# Patient Record
Sex: FEMALE | Race: WHITE | Employment: OTHER | URBAN - METROPOLITAN AREA
[De-identification: names, ages, dates, MRNs, and addresses within clinical notes are randomized per-mention and may not be internally consistent; named-entity substitution may affect disease eponyms.]

---

## 2017-01-12 PROBLEM — F41.1 GAD (GENERALIZED ANXIETY DISORDER): Status: ACTIVE | Noted: 2017-01-12

## 2017-01-12 NOTE — PROGRESS NOTES
HPI:    Patient ID: Eddie Castañeda is a 61year old female. Anxiety  This is a chronic problem. The current episode started more than 1 year ago. The problem occurs constantly. The problem has been gradually worsening.  Pertinent negatives include no needed.  Disp:  Rfl:      Allergies:  Cephalosporins          Hives  Penicillins             Hives  Sulfamethoxazole        Hives  Trimethoprim            Hives   PHYSICAL EXAM:   Physical Exam   Constitutional: She appears well-developed and well-nourished

## 2017-02-04 ENCOUNTER — OFFICE VISIT (OUTPATIENT)
Dept: OBGYN CLINIC | Facility: CLINIC | Age: 57
End: 2017-02-04

## 2017-02-04 VITALS
SYSTOLIC BLOOD PRESSURE: 128 MMHG | DIASTOLIC BLOOD PRESSURE: 83 MMHG | BODY MASS INDEX: 26 KG/M2 | WEIGHT: 173.38 LBS | HEART RATE: 75 BPM

## 2017-02-04 DIAGNOSIS — N92.0 MENORRHAGIA WITH REGULAR CYCLE: Primary | ICD-10-CM

## 2017-02-04 PROCEDURE — 99204 OFFICE O/P NEW MOD 45 MIN: CPT | Performed by: OBSTETRICS & GYNECOLOGY

## 2017-02-04 NOTE — PROGRESS NOTES
Leopold Otter    1960       Patient presents with:  Gyn Problem: heavy menses/NEW PT TO CAP  Pt referred by pcp due to she is still 61years old with regular monthly menses.   Pt states that she feels fine and has no abnormal bleeding between to irritated area  Twice a day Disp: 30 g Rfl: 2   LORazepam (ATIVAN) 0.5 MG Oral Tab Take 1 tablet by mouth every 6 (six) hours as needed for Anxiety.  take 1 tablet (0.5MG)  by ORAL route  every 6 hours for anxiety Disp: 60 tablet Rfl: 5   betamethasone v

## 2017-02-14 ENCOUNTER — TELEPHONE (OUTPATIENT)
Dept: INTERNAL MEDICINE CLINIC | Facility: CLINIC | Age: 57
End: 2017-02-14

## 2017-02-14 NOTE — TELEPHONE ENCOUNTER
Per pt, the med Escitalopram she's taking makes her feel weird, nauseous, headaches, lethargic on/off, pt would like to know if she can stop now or what to do.

## 2017-02-14 NOTE — TELEPHONE ENCOUNTER
Pt stts taking Escitalopram 5 mg for 1 month but it makes her feel \"foggy and weird\". Would like to stop taking medication and be put back on Ativan. Please advise.

## 2017-02-23 ENCOUNTER — TELEPHONE (OUTPATIENT)
Dept: INTERNAL MEDICINE CLINIC | Facility: CLINIC | Age: 57
End: 2017-02-23

## 2017-02-23 NOTE — TELEPHONE ENCOUNTER
Pt calling in states she spoke to Dr. Ana Sanchez due to Dr. Ruben Duron being out of the office. Dr. Ana Sanchez told patient to discontinue medication due to patient not feeling \"right\" on medication.       Pt states she would like to speak with Dr. Liseth Pratt

## 2017-02-25 RX ORDER — ALPRAZOLAM 0.25 MG/1
0.25 TABLET ORAL 2 TIMES DAILY PRN
Qty: 30 TABLET | Refills: 0 | Status: SHIPPED | OUTPATIENT
Start: 2017-02-25 | End: 2018-03-17

## 2017-02-25 NOTE — TELEPHONE ENCOUNTER
Per Dr. Hayden Azul printed script, order called to [facility] at [address] [place] [address] on file for Alprazolam 0.25 mg, qty #30; no additional refills.

## 2017-02-25 NOTE — TELEPHONE ENCOUNTER
Patient returned Dr. Mohini Najera call. Advise DR. Jose Lunsford was with a patient and will   Provide the message to him.  Patient will wait for Dr. Mohini Najera call   Please call at [phone]  Thank you,

## 2017-02-28 ENCOUNTER — OFFICE VISIT (OUTPATIENT)
Dept: PODIATRY CLINIC | Facility: CLINIC | Age: 57
End: 2017-02-28

## 2017-02-28 DIAGNOSIS — B35.1 DERMATOPHYTOSIS OF NAIL: Primary | ICD-10-CM

## 2017-02-28 PROCEDURE — 99213 OFFICE O/P EST LOW 20 MIN: CPT

## 2017-02-28 NOTE — PROGRESS NOTES
HPI:    Patient ID: Jose Adan is a 61year old female. HPI     1. Toenail Mycosis  This is chronic problem. Receives in office nail care as treatment - stable on current management. Denies any new complains.  The patient has come to the clinic f EACH NOSTRIL TWO TIMES A DAY Disp: 1 Bottle Rfl: 3   Levocetirizine Dihydrochloride 5 MG Oral Tab Take 1 tablet (5 mg total) by mouth every evening. Disp: 30 tablet Rfl: 1   naproxen 500 MG Oral Tab Take 500 mg by mouth 2 (two) times daily as needed.  Disp: Denies any new complains. The patient has come to the clinic for f/u nail fungus right hallux. She has been using Vicks vapor rubs on a daily basis and states she feels like progress has slowed. She also noticed some on her left hallux.    Clinical examinat

## 2017-03-09 ENCOUNTER — OFFICE VISIT (OUTPATIENT)
Dept: FAMILY MEDICINE CLINIC | Facility: CLINIC | Age: 57
End: 2017-03-09

## 2017-03-09 VITALS
HEART RATE: 89 BPM | WEIGHT: 165 LBS | BODY MASS INDEX: 25.01 KG/M2 | DIASTOLIC BLOOD PRESSURE: 80 MMHG | SYSTOLIC BLOOD PRESSURE: 120 MMHG | HEIGHT: 68 IN | OXYGEN SATURATION: 98 % | RESPIRATION RATE: 16 BRPM | TEMPERATURE: 98 F

## 2017-03-09 DIAGNOSIS — J01.40 ACUTE PANSINUSITIS, RECURRENCE NOT SPECIFIED: Primary | ICD-10-CM

## 2017-03-09 PROCEDURE — 99213 OFFICE O/P EST LOW 20 MIN: CPT | Performed by: NURSE PRACTITIONER

## 2017-03-09 RX ORDER — ESCITALOPRAM OXALATE 10 MG/1
10 TABLET ORAL DAILY
Qty: 30 TABLET | Refills: 1 | OUTPATIENT
Start: 2017-03-09

## 2017-03-09 RX ORDER — DOXYCYCLINE HYCLATE 100 MG
100 TABLET ORAL 2 TIMES DAILY
Qty: 14 TABLET | Refills: 0 | Status: SHIPPED | OUTPATIENT
Start: 2017-03-09 | End: 2017-03-16

## 2017-03-09 NOTE — PATIENT INSTRUCTIONS
Acute Bacterial Rhinosinusitis (ABRS)  Acute bacterial rhinosinusitis (ABRS) is an infection of your nasal cavity and sinuses. It’s caused by bacteria. Acute means that you’ve had symptoms for less than 12 weeks.   Understanding your sinuses  The nasal ca · Nasal decongestant medicine. Spray or drops may help to lessen congestion. Do not use them for more than a few days. · Salt wash (saline irrigation). This can help to loosen mucus.   Possible complications of ABRS  ABRS may come back or become long-term · Drink plenty of water, hot tea, and other liquids. This may help thin mucus. It also may promote sinus drainage. · Heat may help soothe painful areas of the face. Use a towel soaked in hot water.  Or,  the shower and direct the hot spray onto you · Unusual drowsiness or confusion  · Swelling of the forehead or eyelids  · Vision problems, including blurred or double vision  · Fever of 100.4ºF (38ºC) or higher, or as directed by your healthcare provider  · Seizure  · Breathing problems  · Symptoms no · redness, blistering, peeling or loosening of the skin, including inside the mouth  · severe stomach pain or cramps  · unusual bleeding or bruising  · unusually weak or tired  · yellowing of the eyes or skin  Side effects that usually do not require medic Do not take this medicine just before going to bed. It may not dissolve properly when you lay down and can cause pain in your throat. Drink plenty of fluids while taking this medicine to also help reduce irritation in your throat.   This medicine can make y

## 2017-03-09 NOTE — PROGRESS NOTES
CHIEF COMPLAINT:   Patient presents with:  URI: X 7 days, worsened in last three days      HPI:   Lilia Agrawal is a 61year old female who presents for upper respiratory symptoms for  7 days.  Patient reports congestion, turning green colored nasal di Tretinoin (ATRALIN) 0.05 % Apply Externally Gel Apply  topically.  apply 1 Application by Topical route  every day to affected area Disp:  Rfl:       Past Medical History   Diagnosis Date   • Chronic rhinitis    • Right knee injury 1989     Arthroscopy   • CARDIO: RRR without murmur  EXTREMITIES: no cyanosis, clubbing or edema  LYMPH:  + anterior lymphadenopathy.         ASSESSMENT AND PLAN:   Blas Nguyen is a 61year old female who presents with upper respiratory symptoms that are consistent with    A The symptoms of ABRS may be different for each person, and can include:  · Nasal congestion  · Runny nose  · Fluid draining from the nose down the throat (postnasal drip)  · Headache  · Cough  · Pain in the sinuses  · Thick, colored fluid from the nose (mu · Confusion or trouble staying awake   Date Last Reviewed: 3/3/2015  © 6820-7682 [address], [facility] [place]. All rights reserved. This information is not intended as a substitute for professional medical care.  Elaine Cid · Over-the-counter decongestants may be used unless a similar medicine was prescribed. Nasal sprays work the fastest. Use one that contains phenylephrine or oxymetazoline. First blow the nose gently. Then use the spray.  Do not use these medicines more ofte © 1107-0781 The [address] [place] [address], [address] [place]. All rights reserved. This information is not intended as a substitute for professional medical care. Always follow your healthcare professional's instructions.         Doxycyc · loss of appetite  · nausea, vomiting  What may interact with this medicine?   · antacids  · barbiturates  · birth control pills  · bismuth subsalicylate  · carbamazepine  · methoxyflurane  · other antibiotics  · phenytoin  · vitamins that contain iron  · Birth control pills may not work properly while you are taking this medicine. Talk to your doctor about using an extra method of birth control.   If you are being treated for a sexually transmitted infection, avoid sexual contact until you have finished you

## 2017-04-03 ENCOUNTER — TELEPHONE (OUTPATIENT)
Dept: OBGYN CLINIC | Facility: CLINIC | Age: 57
End: 2017-04-03

## 2017-04-03 DIAGNOSIS — N92.0 MENORRHAGIA WITH REGULAR CYCLE: Primary | ICD-10-CM

## 2017-04-03 NOTE — TELEPHONE ENCOUNTER
Per the pt she is suppose to have a pelvic ultrasound done, but there is no order in the system for it. The pt is requesting that order. The pt states that a detailed v/m can be left at [phone]. Please advise.

## 2017-04-03 NOTE — TELEPHONE ENCOUNTER
Pt was seen by CAP on 2/4/17 for heavy menses. Per CAPs office visit notes, CAP rec that pt do an US day 7-10 of her cycle and if endometrium looks abnormal then do embx. Message to CAP to please review and advise--OK to order US?

## 2017-04-05 NOTE — TELEPHONE ENCOUNTER
LMP 4/1/17. Pt wanting to schedule US day 7-10 of her cycle. Sent to CAP to put in order. How do you want it ordered.

## 2017-04-08 ENCOUNTER — HOSPITAL ENCOUNTER (OUTPATIENT)
Dept: ULTRASOUND IMAGING | Age: 57
Discharge: HOME | End: 2017-04-08
Attending: OBSTETRICS & GYNECOLOGY
Payer: COMMERCIAL

## 2017-04-08 DIAGNOSIS — N92.0 MENORRHAGIA WITH REGULAR CYCLE: ICD-10-CM

## 2017-04-08 PROCEDURE — 76856 US EXAM PELVIC COMPLETE: CPT

## 2017-04-08 PROCEDURE — 76830 TRANSVAGINAL US NON-OB: CPT

## 2017-04-10 ENCOUNTER — TELEPHONE (OUTPATIENT)
Dept: OBGYN CLINIC | Facility: CLINIC | Age: 57
End: 2017-04-10

## 2017-04-10 RX ORDER — MISOPROSTOL 200 UG/1
400 TABLET ORAL ONCE
Qty: 2 TABLET | Refills: 0 | Status: SHIPPED | OUTPATIENT
Start: 2017-04-10 | End: 2017-04-10

## 2017-04-11 NOTE — TELEPHONE ENCOUNTER
Endometrial lining is thickened for  Her age- I recommend the emb that we discussed at visit. Please send erx for cytotec 400mcg at bedtime, the night before procedure and to take motrin 400 mg 30 min before her appt.   US is otherwise normal.

## 2017-04-11 NOTE — TELEPHONE ENCOUNTER
Pt notified of CAP recs below. Pt accepted appt on 4/26 for EMBX. Pt instructed to take 400 mcg of cytotec the night before procedure and 400 mg of Motrin 30 min prior to appt.

## 2017-04-24 NOTE — TELEPHONE ENCOUNTER
PT CALLING BACK FOR DIRECTION ON TAKING THE MEDICATION BEFORE THE PROCURE THE MOTRIN MEDS / HERE'S ANOTHER NUMBER TO REACH [phone] / PLS ADV

## 2017-04-24 NOTE — TELEPHONE ENCOUNTER
Informed pt on proper use of the cytotec, take orally at bedtime the night before the procedure. Pt verbalized understanding.

## 2017-04-26 ENCOUNTER — OFFICE VISIT (OUTPATIENT)
Dept: OBGYN CLINIC | Facility: CLINIC | Age: 57
End: 2017-04-26

## 2017-04-26 VITALS — SYSTOLIC BLOOD PRESSURE: 127 MMHG | DIASTOLIC BLOOD PRESSURE: 83 MMHG | HEART RATE: 71 BPM

## 2017-04-26 DIAGNOSIS — N92.0 EXCESSIVE OR FREQUENT MENSTRUATION: ICD-10-CM

## 2017-04-26 DIAGNOSIS — N92.0 MENORRHAGIA WITH REGULAR CYCLE: Primary | ICD-10-CM

## 2017-04-26 PROCEDURE — 58100 BIOPSY OF UTERUS LINING: CPT | Performed by: OBSTETRICS & GYNECOLOGY

## 2017-04-26 PROCEDURE — 88305 TISSUE EXAM BY PATHOLOGIST: CPT | Performed by: OBSTETRICS & GYNECOLOGY

## 2017-04-26 NOTE — PROCEDURES
Endometrial Biopsy    Pre-Procedure Care:   Consent was obtained. Procedure/risks were explained. Questions were answered. Correct patient was identified. Correct side and site were confirmed.     Pregnancy Results: negative from n/a test   Birth contro

## 2017-04-28 ENCOUNTER — TELEPHONE (OUTPATIENT)
Dept: OBGYN CLINIC | Facility: CLINIC | Age: 57
End: 2017-04-28

## 2017-05-01 NOTE — TELEPHONE ENCOUNTER
Pt informed of results and recs to monitor periods for 1-2 cycles. Pt states she is confused. She was not aware that she had a polyp, and states the biospy was to check on her uterine lining. Pt informed that the polyp was found with the biopsy.   Pt stat

## 2017-05-01 NOTE — TELEPHONE ENCOUNTER
Endometrial biopsy was suggestive of a benign endometrial polyp- if her bleeding persists then it may be possible that not all of the polyp came out at the time of emb and she may need to have a hysteroscopy/D&C.   Please have her monitor her next 1-2 mense

## 2017-05-05 NOTE — TELEPHONE ENCOUNTER
Called pt and explained the path report and CAPs note below. Pt now understands and is very appreciative for the clarification. Pt will monitor 1-2 more menses and call us with an update. All questions answered, pt verbalized understanding.

## 2017-06-12 ENCOUNTER — TELEPHONE (OUTPATIENT)
Dept: PODIATRY CLINIC | Facility: CLINIC | Age: 57
End: 2017-06-12

## 2017-06-12 NOTE — TELEPHONE ENCOUNTER
pt called. She was a pt of [facility]. She asked if Dr. Hafsa Luna removes toenail. Her toenail has split. Please advise. Thank you.

## 2017-08-22 ENCOUNTER — OFFICE VISIT (OUTPATIENT)
Dept: INTERNAL MEDICINE CLINIC | Facility: CLINIC | Age: 57
End: 2017-08-22

## 2017-08-22 VITALS
SYSTOLIC BLOOD PRESSURE: 126 MMHG | TEMPERATURE: 99 F | HEIGHT: 68 IN | RESPIRATION RATE: 12 BRPM | DIASTOLIC BLOOD PRESSURE: 77 MMHG | HEART RATE: 73 BPM

## 2017-08-22 DIAGNOSIS — M54.50 ACUTE BILATERAL LOW BACK PAIN WITHOUT SCIATICA: Primary | ICD-10-CM

## 2017-08-22 LAB
APPEARANCE: CLEAR
BACTERIA UR QL AUTO: NEGATIVE /HPF
BILIRUB UR QL: NEGATIVE
BILIRUBIN: NEGATIVE
CLARITY UR: CLEAR
COLOR UR: YELLOW
GLUCOSE (URINE DIPSTICK): NEGATIVE MG/DL
GLUCOSE UR-MCNC: NEGATIVE MG/DL
KETONES (URINE DIPSTICK): NEGATIVE MG/DL
KETONES UR-MCNC: NEGATIVE MG/DL
LEUKOCYTE ESTERASE UR QL STRIP.AUTO: NEGATIVE
LEUKOCYTES: NEGATIVE
MULTISTIX LOT#: NORMAL NUMERIC
NITRITE UR QL STRIP.AUTO: NEGATIVE
NITRITE, URINE: NEGATIVE
PH UR: 6 [PH] (ref 5–8)
PH, URINE: 6 (ref 4.5–8)
PROT UR-MCNC: NEGATIVE MG/DL
PROTEIN (URINE DIPSTICK): NEGATIVE MG/DL
RBC #/AREA URNS AUTO: 1 /HPF
SP GR UR STRIP: 1.02 (ref 1–1.03)
SPECIFIC GRAVITY: 1.02 (ref 1–1.03)
URINE-COLOR: YELLOW
UROBILINOGEN UR STRIP-ACNC: <2
UROBILINOGEN,SEMI-QN: 0.2 MG/DL (ref 0–1.9)
VIT C UR-MCNC: NEGATIVE MG/DL
WBC #/AREA URNS AUTO: <1 /HPF

## 2017-08-22 PROCEDURE — 99212 OFFICE O/P EST SF 10 MIN: CPT | Performed by: INTERNAL MEDICINE

## 2017-08-22 PROCEDURE — 99214 OFFICE O/P EST MOD 30 MIN: CPT | Performed by: INTERNAL MEDICINE

## 2017-08-22 PROCEDURE — 81003 URINALYSIS AUTO W/O SCOPE: CPT | Performed by: INTERNAL MEDICINE

## 2017-08-22 RX ORDER — CYCLOBENZAPRINE HCL 10 MG
10 TABLET ORAL NIGHTLY
Qty: 10 TABLET | Refills: 0 | Status: SHIPPED | OUTPATIENT
Start: 2017-08-22 | End: 2017-09-11

## 2017-08-22 NOTE — PROGRESS NOTES
HPI:    Patient ID: Chinmay Jarrell is a 61year old female. Low Back Pain   This is a new problem. The current episode started in the past 7 days. The problem occurs constantly. The problem has been waxing and waning since onset.  The pain is present Rfl: 1   betamethasone valerate (VALISONE) 0.1 % Apply Externally Ointment Apply lightly to irritated area twice a day Disp: 45 g Rfl: 0   Mometasone Furoate (NASONEX) 50 MCG/ACT Nasal Suspension by Nasal route.  inhale 1 spray by Intranasal route 2 times e blood. Doubt she has uti based on exam findings and no pyuria. I suspect this is low back strain and no neurologic deficit noted. . We will continue otc nsaids she is taking and added flexeril q hs. Pt told to call back if symptoms persist/worsens.         Ruben File

## 2017-08-30 ENCOUNTER — TELEPHONE (OUTPATIENT)
Dept: OBGYN CLINIC | Facility: CLINIC | Age: 57
End: 2017-08-30

## 2017-08-30 NOTE — TELEPHONE ENCOUNTER
Pt had sent MyChart note. See below. Hello Dr. Shearon Blizzard,     I was in to see you in April 2017 and had some testing done.  I just wanted to let you know that I just got my period and not sure if I should be concerned  or  just ignore this?      Thanks,

## 2017-09-09 ENCOUNTER — HOSPITAL ENCOUNTER (OUTPATIENT)
Dept: MAMMOGRAPHY | Age: 57
Discharge: HOME | End: 2017-09-09
Attending: INTERNAL MEDICINE
Payer: COMMERCIAL

## 2017-09-09 DIAGNOSIS — Z12.31 ENCOUNTER FOR SCREENING MAMMOGRAM FOR MALIGNANT NEOPLASM OF BREAST: ICD-10-CM

## 2017-09-09 PROCEDURE — 77067 SCR MAMMO BI INCL CAD: CPT | Performed by: INTERNAL MEDICINE

## 2017-09-09 NOTE — TELEPHONE ENCOUNTER
EB IN April 2017 AND WAS TOLD IT WAS NORMAL, JUST SOME SHEDDING. REPORTS SHE GOT A PERIOD ON 8-29 THAT LAST A WEEK, FIRST PERIOD SINCE APRIL. IS NOT HAVING ANY OTHER SYMPTOMS. PERIOD WAS NOT UNUSUALLY HEAVY, SIMPLY AN AVERAGE PERIOD.   PT AWARE CAP IS O

## 2017-09-17 NOTE — TELEPHONE ENCOUNTER
I would like to proceed with hysteroscopy/D&C as we discussed if bleeding continued. If she would like to discuss this with me for preop appt please give her a 10 min appt of she wishes to discuss before proceeding.   There may still be a polyp inside the

## 2017-09-28 NOTE — TELEPHONE ENCOUNTER
Called pt to inform her of CAP's recs below. Pt wanted CAP to know that her last period was 8/29/17 and no bleeding since that date. Since the bleeding has stopped pt wants to make sure she does not need to meet with you to discuss hysteroscopy/D&C.

## 2017-09-29 ENCOUNTER — OFFICE VISIT (OUTPATIENT)
Dept: INTERNAL MEDICINE CLINIC | Facility: CLINIC | Age: 57
End: 2017-09-29

## 2017-09-29 VITALS
DIASTOLIC BLOOD PRESSURE: 77 MMHG | TEMPERATURE: 98 F | RESPIRATION RATE: 12 BRPM | HEIGHT: 68 IN | HEART RATE: 91 BPM | SYSTOLIC BLOOD PRESSURE: 120 MMHG

## 2017-09-29 DIAGNOSIS — M77.8 RIGHT HAND TENDONITIS: Primary | ICD-10-CM

## 2017-09-29 PROCEDURE — 99212 OFFICE O/P EST SF 10 MIN: CPT | Performed by: INTERNAL MEDICINE

## 2017-09-29 PROCEDURE — 99214 OFFICE O/P EST MOD 30 MIN: CPT | Performed by: INTERNAL MEDICINE

## 2017-09-29 RX ORDER — MELOXICAM 15 MG/1
15 TABLET ORAL DAILY
Qty: 14 TABLET | Refills: 0 | Status: SHIPPED | OUTPATIENT
Start: 2017-09-29 | End: 2017-12-01

## 2017-09-29 NOTE — PROGRESS NOTES
HPI:    Patient ID: Juan Manuel George is a 61year old female. Hand Pain    The pain is present in the right hand. This is a new problem. The current episode started more than 1 month ago. There has been no history of extremity trauma.  The problem occu Left eye exhibits no discharge. Neck: Normal range of motion. Neck supple. No JVD present. Cardiovascular: Normal rate, regular rhythm and normal heart sounds. No murmur heard.   Pulmonary/Chest: Effort normal and breath sounds normal. No respiratory

## 2017-10-02 ENCOUNTER — TELEPHONE (OUTPATIENT)
Dept: OBGYN CLINIC | Facility: CLINIC | Age: 57
End: 2017-10-02

## 2017-10-02 NOTE — TELEPHONE ENCOUNTER
Please see 8/30 comm. CAP stated that pt should come in for a 10 min appt to discuss irregular bleeding and talk about possible D&C. Pt stated that she had bleeding on 8/29 for one week.  Pt stated that by the 2nd or 3rd day the bleeding was like a \"normal

## 2017-10-02 NOTE — TELEPHONE ENCOUNTER
Pt states she has been getting a period, states had one on 08/29 and now again 10/01, pt would like to know if this normal since she did have a procedure done in April.  pls adv

## 2017-10-03 ENCOUNTER — OFFICE VISIT (OUTPATIENT)
Dept: OBGYN CLINIC | Facility: CLINIC | Age: 57
End: 2017-10-03

## 2017-10-03 ENCOUNTER — TELEPHONE (OUTPATIENT)
Dept: OBGYN CLINIC | Facility: CLINIC | Age: 57
End: 2017-10-03

## 2017-10-03 VITALS
BODY MASS INDEX: 28 KG/M2 | DIASTOLIC BLOOD PRESSURE: 82 MMHG | HEART RATE: 81 BPM | WEIGHT: 183.19 LBS | SYSTOLIC BLOOD PRESSURE: 127 MMHG

## 2017-10-03 DIAGNOSIS — N95.0 POSTMENOPAUSAL BLEEDING: Primary | ICD-10-CM

## 2017-10-03 PROCEDURE — 99212 OFFICE O/P EST SF 10 MIN: CPT | Performed by: OBSTETRICS & GYNECOLOGY

## 2017-10-03 RX ORDER — MISOPROSTOL 200 UG/1
TABLET ORAL
Qty: 2 TABLET | Refills: 0 | Status: SHIPPED | OUTPATIENT
Start: 2017-10-03 | End: 2017-12-01

## 2017-10-03 NOTE — TELEPHONE ENCOUNTER
Pt would like to speak with Malathi Jamil regarding her visit today, states it is pertaining surgery. Pt states she didn't give the correct or full amount of info.  Per pt she doesn't stop breathing during/after surgery, her bp drops to 0 when recovering from royal

## 2017-10-03 NOTE — TELEPHONE ENCOUNTER
I would still recommend she get records and have an anesthesia consult due to both episodes happened after general anesthesia

## 2017-10-03 NOTE — TELEPHONE ENCOUNTER
Pt states she just saw CAP today and \"was explaining to [facility] that she stops breathing during surgery\" but now that she thinks about it what really happens is \"her BP drops to zero\". Pt states she was very nervous when she was here today.  Pt states s

## 2017-10-03 NOTE — PROGRESS NOTES
Kadeem Becerril    8/26/1960       Patient presents with:  Gyn Problem: Irregular  Pt has had 3 episodes of PMB in spite of negative EMB recently.   I recommended a hys/D&C but pt is very anxious about this because she has had complications with genera

## 2017-10-04 NOTE — TELEPHONE ENCOUNTER
Lmtcb. Please relay info:    Called to see if  An appointment for sonohyst on 10/23/17 in the morning would work for the patient.   Also, wanted to inform the patient that since her us of the pelvis was 10 months old the radiologist may want her to have bot

## 2017-10-04 NOTE — TELEPHONE ENCOUNTER
Pt is calling back ,  10/23 is fine for the procedure , can she drive after it ? And needs the time,.

## 2017-10-04 NOTE — TELEPHONE ENCOUNTER
Pt is returning call, okay to leave detailed vm on mobile number. States that Monday isnt really a good day for her, can it be moved to a Friday?

## 2017-10-06 NOTE — TELEPHONE ENCOUNTER
Patient is scheduled 11/10/17 10:30am pt arrival 11:30am CAP. Patient advised to take up to 600 mgs of ibuprofen 30-60 mins prior to the appointment.  Also, advised to park in green lot and report to [address]   Patient informed she will repeat Reinaldo Blackmon

## 2017-10-11 NOTE — TELEPHONE ENCOUNTER
Called [phone]  And spoke Jhony Lopez. Who states they do not complete prior auths for US. Called [facility] and spoke to Weston Shetty who stated prior [place] is required for All three codes 66855,[id number],[id number]. Clincial info routed [phone].

## 2017-10-20 NOTE — TELEPHONE ENCOUNTER
Called aims spoke to Larry Syed. At 10:34am and she stated they do not review codes  10713,88401,88809. Advised me to contact [facility] [place] who advised me to contact the patient.    Ingris Shipman at [facility] we should have Received documentation noting no prior au

## 2017-10-31 NOTE — TELEPHONE ENCOUNTER
Pt states she got a heavy period and has 2 tests coming up.  Wants to speak to a nurse on what to do

## 2017-10-31 NOTE — TELEPHONE ENCOUNTER
Pt states her period started yesterday and it is heavier than usual. Pt reports she is saturating a tampon about every 1 or 2 hours but is not soaking through them. Pt denies SOB, heart palpitations and dizziness.  Pt states CAP had mentioned that maybe pt

## 2017-11-01 NOTE — TELEPHONE ENCOUNTER
Please advise pt that the sonohysterogram will provide answers about what is causing the bleeding and how best to remove it if there is a lesion present in the uterine cavity. I recommend that we proceed as planned with the sonohysterogram on 11/10/17.

## 2017-11-06 ENCOUNTER — TELEPHONE (OUTPATIENT)
Dept: OBGYN CLINIC | Facility: CLINIC | Age: 57
End: 2017-11-06

## 2017-11-06 NOTE — TELEPHONE ENCOUNTER
PT IS SCHEDULED FOR A SONOHYST ON FRI, 11-10-17 AND PT NEEDS TO CHECK IN AT 10:30AM AS SHE WILL BE HAVING A TRANSVAGINAL ULTRASOUND FIRST. PT MUST NOT BE LATE SO IT WOULD HELP IF SHE ARRIVES AT LEAST 15 MINUTES EARLY TO CHECK IN.    PT NOTIFIED OF THIS INF

## 2017-11-10 ENCOUNTER — HOSPITAL ENCOUNTER (OUTPATIENT)
Dept: ULTRASOUND IMAGING | Facility: HOSPITAL | Age: 57
Discharge: HOME | End: 2017-11-10
Attending: OBSTETRICS & GYNECOLOGY
Payer: COMMERCIAL

## 2017-11-10 ENCOUNTER — TELEPHONE (OUTPATIENT)
Dept: OBGYN CLINIC | Facility: CLINIC | Age: 57
End: 2017-11-10

## 2017-11-10 DIAGNOSIS — N95.0 POSTMENOPAUSAL BLEEDING: ICD-10-CM

## 2017-11-10 PROCEDURE — 76831 ECHO EXAM UTERUS: CPT | Performed by: OBSTETRICS & GYNECOLOGY

## 2017-11-10 PROCEDURE — 76856 US EXAM PELVIC COMPLETE: CPT | Performed by: OBSTETRICS & GYNECOLOGY

## 2017-11-10 PROCEDURE — 58340 CATHETER FOR HYSTEROGRAPHY: CPT | Performed by: OBSTETRICS & GYNECOLOGY

## 2017-11-10 PROCEDURE — 76830 TRANSVAGINAL US NON-OB: CPT | Performed by: OBSTETRICS & GYNECOLOGY

## 2017-11-13 NOTE — TELEPHONE ENCOUNTER
OUTSIDE RECORDS RECEIVED RE: 2014 BREAST REDUCTION AT [facility] TO CAP'S DESK.

## 2017-11-14 ENCOUNTER — TELEPHONE (OUTPATIENT)
Dept: PEDIATRICS CLINIC | Facility: CLINIC | Age: 57
End: 2017-11-14

## 2017-11-14 NOTE — TELEPHONE ENCOUNTER
Pt calling to report that CAP told pt she needs to have D&C for 3 polyps that were noted in her uterus during her hysterosonogram on 11/10. Pt wanting to have D&C done before the end of the year. Message to CAP to please advise.

## 2017-11-16 NOTE — TELEPHONE ENCOUNTER
OB GYN SURGICAL SCHEDULING    Assessment:  endometrial polyps    Pre-Operative Procedure:  Hysteroscopy - surgical, myosure polypectomy        Admission:  Day Surgery    Anesthesia: General    Additional Orders:  Routine Orders    [facility]

## 2017-11-20 NOTE — TELEPHONE ENCOUNTER
Patient confirmed her last cycle started 10/30/17 and she was advised to call first day of next cycle. Per MF patient advised to call if period does not come within next 90 days. PAPERWORK STARTED.

## 2017-11-27 NOTE — TELEPHONE ENCOUNTER
Spoke to patient noted day 7-10 and that I will follow up with her once I receive a confirmation from Dr. Tucker Elizalde.

## 2017-11-29 NOTE — TELEPHONE ENCOUNTER
Patient is scheduled 12/4/17 10:30 hysteroscopy CAP. Pat orders routed. Instructions routed via [employer]. Patient informed.

## 2017-11-29 NOTE — TELEPHONE ENCOUNTER
See below- I have already sent the order for this patients surgery, please schedule hysteroscopy to follow in another room when I am done with my hysterectomy on 12/4 which would be day #10 for her cycle.   Dr Roderick Iraheta will be doing a sling after NJG and I

## 2017-11-29 NOTE — TELEPHONE ENCOUNTER
Called [phone] spoke to Greg Montgomery who noted no prior auth was required for cpt outpatient [id number] NSN#[id number].

## 2017-12-01 ENCOUNTER — TELEPHONE (OUTPATIENT)
Dept: OBGYN CLINIC | Facility: CLINIC | Age: 57
End: 2017-12-01

## 2017-12-01 NOTE — TELEPHONE ENCOUNTER
PER PT CENTRAL SCHEDULING CALLED HER  TO INFORM THE OFFICE NEEDS TO CALL THE INS TO MAKE SURE THE SURGERY IS APPROVED

## 2017-12-04 ENCOUNTER — ANESTHESIA (OUTPATIENT)
Dept: SURGERY | Facility: HOSPITAL | Age: 57
End: 2017-12-04
Payer: COMMERCIAL

## 2017-12-04 ENCOUNTER — ANESTHESIA EVENT (OUTPATIENT)
Dept: SURGERY | Facility: HOSPITAL | Age: 57
End: 2017-12-04
Payer: COMMERCIAL

## 2017-12-04 ENCOUNTER — HOSPITAL ENCOUNTER (OUTPATIENT)
Facility: HOSPITAL | Age: 57
Setting detail: HOSPITAL OUTPATIENT SURGERY
Discharge: HOME | End: 2017-12-04
Attending: OBSTETRICS & GYNECOLOGY | Admitting: OBSTETRICS & GYNECOLOGY
Payer: COMMERCIAL

## 2017-12-04 ENCOUNTER — SURGERY (OUTPATIENT)
Age: 57
End: 2017-12-04

## 2017-12-04 VITALS
HEIGHT: 68 IN | HEART RATE: 50 BPM | BODY MASS INDEX: 27.13 KG/M2 | SYSTOLIC BLOOD PRESSURE: 123 MMHG | DIASTOLIC BLOOD PRESSURE: 68 MMHG | OXYGEN SATURATION: 96 % | WEIGHT: 179 LBS | RESPIRATION RATE: 14 BRPM | TEMPERATURE: 97 F

## 2017-12-04 PROBLEM — N95.0 POSTMENOPAUSAL BLEEDING: Status: ACTIVE | Noted: 2017-12-04

## 2017-12-04 PROBLEM — N84.0 ENDOMETRIAL POLYP: Status: ACTIVE | Noted: 2017-12-04

## 2017-12-04 PROCEDURE — 0UB98ZX EXCISION OF UTERUS, VIA NATURAL OR ARTIFICIAL OPENING ENDOSCOPIC, DIAGNOSTIC: ICD-10-PCS | Performed by: OBSTETRICS & GYNECOLOGY

## 2017-12-04 PROCEDURE — 0UDB8ZX EXTRACTION OF ENDOMETRIUM, VIA NATURAL OR ARTIFICIAL OPENING ENDOSCOPIC, DIAGNOSTIC: ICD-10-PCS | Performed by: OBSTETRICS & GYNECOLOGY

## 2017-12-04 PROCEDURE — 58558 HYSTEROSCOPY BIOPSY: CPT | Performed by: OBSTETRICS & GYNECOLOGY

## 2017-12-04 RX ORDER — KETOROLAC TROMETHAMINE 30 MG/ML
30 INJECTION, SOLUTION INTRAMUSCULAR; INTRAVENOUS ONCE
Status: COMPLETED | OUTPATIENT
Start: 2017-12-04 | End: 2017-12-04

## 2017-12-04 RX ORDER — HYDROMORPHONE HYDROCHLORIDE 1 MG/ML
0.2 INJECTION, SOLUTION INTRAMUSCULAR; INTRAVENOUS; SUBCUTANEOUS EVERY 5 MIN PRN
Status: DISCONTINUED | OUTPATIENT
Start: 2017-12-04 | End: 2017-12-04

## 2017-12-04 RX ORDER — ONDANSETRON HYDROCHLORIDE 8 MG/1
4 TABLET, FILM COATED ORAL EVERY 8 HOURS PRN
Status: DISCONTINUED | OUTPATIENT
Start: 2017-12-04 | End: 2017-12-04

## 2017-12-04 RX ORDER — HYDROCODONE BITARTRATE AND ACETAMINOPHEN 5; 325 MG/1; MG/1
2 TABLET ORAL AS NEEDED
Status: DISCONTINUED | OUTPATIENT
Start: 2017-12-04 | End: 2017-12-04

## 2017-12-04 RX ORDER — NALOXONE HYDROCHLORIDE 0.4 MG/ML
80 INJECTION, SOLUTION INTRAMUSCULAR; INTRAVENOUS; SUBCUTANEOUS AS NEEDED
Status: DISCONTINUED | OUTPATIENT
Start: 2017-12-04 | End: 2017-12-04

## 2017-12-04 RX ORDER — IBUPROFEN 200 MG
200 TABLET ORAL EVERY 4 HOURS PRN
Status: DISCONTINUED | OUTPATIENT
Start: 2017-12-04 | End: 2017-12-04

## 2017-12-04 RX ORDER — HYDROMORPHONE HYDROCHLORIDE 1 MG/ML
0.6 INJECTION, SOLUTION INTRAMUSCULAR; INTRAVENOUS; SUBCUTANEOUS EVERY 5 MIN PRN
Status: DISCONTINUED | OUTPATIENT
Start: 2017-12-04 | End: 2017-12-04

## 2017-12-04 RX ORDER — MORPHINE SULFATE 2 MG/ML
2 INJECTION, SOLUTION INTRAMUSCULAR; INTRAVENOUS EVERY 10 MIN PRN
Status: DISCONTINUED | OUTPATIENT
Start: 2017-12-04 | End: 2017-12-04

## 2017-12-04 RX ORDER — DIPHENHYDRAMINE HYDROCHLORIDE 50 MG/ML
50 INJECTION INTRAMUSCULAR; INTRAVENOUS
Status: DISCONTINUED | OUTPATIENT
Start: 2017-12-04 | End: 2017-12-04

## 2017-12-04 RX ORDER — SODIUM CHLORIDE, SODIUM LACTATE, POTASSIUM CHLORIDE, CALCIUM CHLORIDE 600; 310; 30; 20 MG/100ML; MG/100ML; MG/100ML; MG/100ML
INJECTION, SOLUTION INTRAVENOUS CONTINUOUS
Status: DISCONTINUED | OUTPATIENT
Start: 2017-12-04 | End: 2017-12-04

## 2017-12-04 RX ORDER — IBUPROFEN 200 MG
400 TABLET ORAL EVERY 4 HOURS PRN
Status: DISCONTINUED | OUTPATIENT
Start: 2017-12-04 | End: 2017-12-04

## 2017-12-04 RX ORDER — ACETAMINOPHEN 500 MG
1000 TABLET ORAL ONCE
Status: COMPLETED | OUTPATIENT
Start: 2017-12-04 | End: 2017-12-04

## 2017-12-04 RX ORDER — HYDROMORPHONE HYDROCHLORIDE 1 MG/ML
0.4 INJECTION, SOLUTION INTRAMUSCULAR; INTRAVENOUS; SUBCUTANEOUS EVERY 5 MIN PRN
Status: DISCONTINUED | OUTPATIENT
Start: 2017-12-04 | End: 2017-12-04

## 2017-12-04 RX ORDER — HYDROCODONE BITARTRATE AND ACETAMINOPHEN 5; 325 MG/1; MG/1
1 TABLET ORAL AS NEEDED
Status: DISCONTINUED | OUTPATIENT
Start: 2017-12-04 | End: 2017-12-04

## 2017-12-04 RX ORDER — MORPHINE SULFATE 4 MG/ML
4 INJECTION, SOLUTION INTRAMUSCULAR; INTRAVENOUS EVERY 10 MIN PRN
Status: DISCONTINUED | OUTPATIENT
Start: 2017-12-04 | End: 2017-12-04

## 2017-12-04 RX ORDER — ONDANSETRON 2 MG/ML
INJECTION INTRAMUSCULAR; INTRAVENOUS AS NEEDED
Status: DISCONTINUED | OUTPATIENT
Start: 2017-12-04 | End: 2017-12-04 | Stop reason: SURG

## 2017-12-04 RX ORDER — MORPHINE SULFATE 10 MG/ML
6 INJECTION, SOLUTION INTRAMUSCULAR; INTRAVENOUS EVERY 10 MIN PRN
Status: DISCONTINUED | OUTPATIENT
Start: 2017-12-04 | End: 2017-12-04

## 2017-12-04 RX ORDER — MIDAZOLAM HYDROCHLORIDE 1 MG/ML
INJECTION INTRAMUSCULAR; INTRAVENOUS AS NEEDED
Status: DISCONTINUED | OUTPATIENT
Start: 2017-12-04 | End: 2017-12-04 | Stop reason: SURG

## 2017-12-04 RX ORDER — IBUPROFEN 600 MG/1
600 TABLET ORAL EVERY 4 HOURS PRN
Status: DISCONTINUED | OUTPATIENT
Start: 2017-12-04 | End: 2017-12-04

## 2017-12-04 RX ORDER — SCOLOPAMINE TRANSDERMAL SYSTEM 1 MG/1
1 PATCH, EXTENDED RELEASE TRANSDERMAL
Status: DISCONTINUED | OUTPATIENT
Start: 2017-12-04 | End: 2017-12-07 | Stop reason: HOSPADM

## 2017-12-04 RX ORDER — METOCLOPRAMIDE 10 MG/1
10 TABLET ORAL ONCE
Status: DISCONTINUED | OUTPATIENT
Start: 2017-12-04 | End: 2017-12-04 | Stop reason: HOSPADM

## 2017-12-04 RX ORDER — SODIUM CHLORIDE 0.9 % (FLUSH) 0.9 %
10 SYRINGE (ML) INJECTION AS NEEDED
Status: CANCELLED | OUTPATIENT
Start: 2017-12-04

## 2017-12-04 RX ORDER — FAMOTIDINE 20 MG/1
20 TABLET ORAL ONCE
Status: DISCONTINUED | OUTPATIENT
Start: 2017-12-04 | End: 2017-12-04 | Stop reason: HOSPADM

## 2017-12-04 RX ORDER — IBUPROFEN 600 MG/1
600 TABLET ORAL EVERY 6 HOURS PRN
Qty: 30 TABLET | Refills: 0 | Status: SHIPPED | OUTPATIENT
Start: 2017-12-04 | End: 2017-12-27

## 2017-12-04 RX ORDER — ONDANSETRON 2 MG/ML
4 INJECTION INTRAMUSCULAR; INTRAVENOUS EVERY 8 HOURS PRN
Status: DISCONTINUED | OUTPATIENT
Start: 2017-12-04 | End: 2017-12-04

## 2017-12-04 RX ORDER — DEXAMETHASONE SODIUM PHOSPHATE 4 MG/ML
VIAL (ML) INJECTION AS NEEDED
Status: DISCONTINUED | OUTPATIENT
Start: 2017-12-04 | End: 2017-12-04 | Stop reason: SURG

## 2017-12-04 RX ORDER — LIDOCAINE HYDROCHLORIDE 10 MG/ML
INJECTION, SOLUTION EPIDURAL; INFILTRATION; INTRACAUDAL; PERINEURAL AS NEEDED
Status: DISCONTINUED | OUTPATIENT
Start: 2017-12-04 | End: 2017-12-04 | Stop reason: SURG

## 2017-12-04 RX ADMIN — DEXAMETHASONE SODIUM PHOSPHATE 4 MG: 4 MG/ML VIAL (ML) INJECTION at 11:09:00

## 2017-12-04 RX ADMIN — SODIUM CHLORIDE, SODIUM LACTATE, POTASSIUM CHLORIDE, CALCIUM CHLORIDE: 600; 310; 30; 20 INJECTION, SOLUTION INTRAVENOUS at 11:15:00

## 2017-12-04 RX ADMIN — ONDANSETRON 4 MG: 2 INJECTION INTRAMUSCULAR; INTRAVENOUS at 11:09:00

## 2017-12-04 RX ADMIN — SODIUM CHLORIDE, SODIUM LACTATE, POTASSIUM CHLORIDE, CALCIUM CHLORIDE: 600; 310; 30; 20 INJECTION, SOLUTION INTRAVENOUS at 10:55:00

## 2017-12-04 RX ADMIN — KETOROLAC TROMETHAMINE 30 MG: 30 INJECTION, SOLUTION INTRAMUSCULAR; INTRAVENOUS at 11:17:00

## 2017-12-04 RX ADMIN — MIDAZOLAM HYDROCHLORIDE 2 MG: 1 INJECTION INTRAMUSCULAR; INTRAVENOUS at 10:56:00

## 2017-12-04 RX ADMIN — LIDOCAINE HYDROCHLORIDE 50 MG: 10 INJECTION, SOLUTION EPIDURAL; INFILTRATION; INTRACAUDAL; PERINEURAL at 10:58:00

## 2017-12-04 NOTE — ANESTHESIA PREPROCEDURE EVALUATION
Anesthesia PreOp Note    HPI:     Barron Wakefield is a 61year old female who presents for preoperative consultation requested by: Joshua Hunter MD    Date of Surgery: 12/4/2017    Procedure(s):   MYOMECTOMY HYSTEROSCOPIC  Indication: endometrial poly 2013  No date: KNEE ARTHROSCOPY Right      Comment: x3 last 2013  2010: ROSI NEEDLE LOCALIZATION W/ SPECIMEN 1 SITE LEFT  2009: ROSI NEEDLE LOCALIZATION W/ SPECIMEN 1 SITE RIG*  05/2014: REDUCTION LEFT  2014: REDUCTION OF LARGE BREAST Bilateral  05/2014: RED History Main Topics   Smoking status: Former Smoker  0.20 Packs/day  For 18.00 Years     Types: Cigarettes    Quit date: 1/1/1988    Smokeless tobacco: Never Used    Comment: socially    Alcohol use Yes  0.5 oz/week    1 Glasses of wine per week         Co Patient  Use of Blood Products Discussed With:  Patient  Blood Product Use Consented        I have informed Manistee Brunt  of the nature of the anesthetic plan, benefits, risks, major complications, and any alternative forms of anesthetic management.

## 2017-12-04 NOTE — OPERATIVE REPORT
[facility] OPERATING ROOM  Operative Note     Shruti [place] Location: OR   [facility] [id number] MRN [id number]   Admission Date 12/4/2017 Operation Date 12/4/2017   Attending Physician Paige Juan MD Operating Physician Parul Solares.  MD Markell Chiang

## 2017-12-04 NOTE — H&P
[address] Patient Status:  Hospital Outpatient Surgery    1960 MRN [id number]   Location [facility] Attending Mayelin Dillard MD   Hosp Day # 0 PCP Barron Hernandez RIGHT  Family History   Problem Relation Age of Onset   • Adopted:  Yes   • Family history unknown: Yes     Social History:  Smoking status: Former Smoker                                                              Packs/day: 0.20      Years: 18.00 06/13/2016   HCT 40.1 06/13/2016    06/13/2016   CREATSERUM 0.85 06/13/2016   BUN 12 06/13/2016    06/13/2016   K 4.1 06/13/2016    06/13/2016   CO2 26 06/13/2016   GLU 88 06/13/2016   CA 8.6 06/13/2016   ALB 4.0 06/13/2016   BILT 0.8 06

## 2017-12-04 NOTE — DISCHARGE SUMMARY
[facility]    Discharge Summary    Christopher Rea Patient Status:  Hospital Outpatient Surgery    1960 MRN [id number]   Location [place] [address] Attending Hollie Garcia MD   Hosp Day # 0 PCP Mirta Brito · Do not drive any motor vehicle or bicycle   · Avoid mowing the lawn, playing sports, or working with power tools/applicances (power saws, electric knives or mixers)   · That you have someone stay with you on your first night home   · Do not drink alcoh

## 2017-12-04 NOTE — ANESTHESIA POSTPROCEDURE EVALUATION
Patient:  Lilia Agrawal    Procedure Summary     Date:  12/04/17 Room / Location:  [address] [place] [address] MAIN OR  / [address] [place] [address] MAIN OR    Anesthesia Start:  1055 Anesthesia Stop:  3498    Procedure:  MYOMECTOMY HYSTEROSCOPIC (N/A Vagina ) Diagnosis:  (endometrial polyp)    S

## 2017-12-05 ENCOUNTER — TELEPHONE (OUTPATIENT)
Dept: OBGYN CLINIC | Facility: CLINIC | Age: 57
End: 2017-12-05

## 2017-12-05 NOTE — TELEPHONE ENCOUNTER
The pt had surgery with Dr Chiang done on 12/4/17, and is suppose to come for a f/up around 12/18/17. Dr Chiang is out the whole week of 12/18/17, and is booked on 12/15/17. The pt would like to know when she should be seen. Please advise.

## 2017-12-06 NOTE — TELEPHONE ENCOUNTER
CAP has openings on 12/12 and 12/13. We can only offer what is available. If she is unable to accept an appt on either of these days, there are several opening the week of 12/25. Please help her to schedule this.

## 2017-12-06 NOTE — TELEPHONE ENCOUNTER
New OB slot has been used. Only slot open is at 9 for 10 minutes and pt would like something earlier. If possible.

## 2017-12-14 ENCOUNTER — TELEPHONE (OUTPATIENT)
Dept: OBGYN CLINIC | Facility: CLINIC | Age: 57
End: 2017-12-14

## 2017-12-14 NOTE — TELEPHONE ENCOUNTER
PT CALLING TO CONFIRMED THAT THE FORM SHE FAXED OVER WAS RECEIVED / IT'S FOR THE DAYS SHE MISSED AT WORK THE DATES 12/04--12/05/2017 / P[T STATE SHE HAD SURGERY /  PLS ADV

## 2017-12-15 ENCOUNTER — TELEPHONE (OUTPATIENT)
Dept: ADMINISTRATIVE | Age: 57
End: 2017-12-15

## 2017-12-15 NOTE — TELEPHONE ENCOUNTER
Disability form was picked up by NK@ [facility] for Dr. Ana Burnham. TRIPP packet emailed to pt [email or web address]). Logged for processing.  ENE

## 2017-12-15 NOTE — TELEPHONE ENCOUNTER
FCR+ Signed release + payment received from pt via fax. Scanned, payment receipt emailed to pt.  ENE

## 2017-12-19 NOTE — TELEPHONE ENCOUNTER
Dr. Audrey Suazo    Please sign off on form:  -Highlight the patient and hit \"Chart\" button. -In Chart Review, w/in the Encounter tab - open the Telephone call encounter for 12-15-17. Scroll down.  -Click \"scan on\" blue Hyperlink under \"Media\" heading for PPD_disability form_Dr [id number].  -Click on Acknowledge button at the bottom right corner and left-click onto image, signature stamp appears and drag signature to Provider signature line. Stamp will turn blue. Close window.     Thank you,  Missy Cantu

## 2017-12-26 NOTE — TELEPHONE ENCOUNTER
Disability form faxed to [address]. Original mailed to patient. Form fee pd. Request complete.

## 2017-12-27 ENCOUNTER — OFFICE VISIT (OUTPATIENT)
Dept: OBGYN CLINIC | Facility: CLINIC | Age: 57
End: 2017-12-27

## 2017-12-27 VITALS
DIASTOLIC BLOOD PRESSURE: 76 MMHG | WEIGHT: 182 LBS | HEART RATE: 83 BPM | BODY MASS INDEX: 28 KG/M2 | SYSTOLIC BLOOD PRESSURE: 120 MMHG

## 2017-12-27 DIAGNOSIS — N84.0 ENDOMETRIAL POLYP: Primary | ICD-10-CM

## 2017-12-27 DIAGNOSIS — Z09 POSTOP CHECK: ICD-10-CM

## 2017-12-27 PROCEDURE — 99213 OFFICE O/P EST LOW 20 MIN: CPT | Performed by: OBSTETRICS & GYNECOLOGY

## 2017-12-28 NOTE — PROGRESS NOTES
Sabrina Cook is a 61year old female [id number] Patient's last menstrual period was 11/29/2017. Patient presents with:  Post-Op  . Had hysteroscopy/myosure polypectomy performed on 12/4/17. Doing well with no bleeding since procedure.   We discussed th oz/week    1 Glasses of wine per week         Comment: weekends a few    Drug use: No    Sexual activity: Not on file     Other Topics Concern    Caffeine Concern Yes    Comment: Coffee, 2 cups daily; Tea;     Pt has a pacemaker No    Reaction to local ane nondistended, no masses Incisions clean / dry / intact, no erythema nor discharge  Extremities: no edema, no cyanosis  Psychiatric:  Oriented to time, place, person and situation.  Appropriate mood and affect    Pelvic Exam: deferred  External Genitalia: no

## 2018-03-10 RX ORDER — LORAZEPAM 0.5 MG/1
TABLET ORAL
Qty: 60 TABLET | Refills: 4 | Status: CANCELLED | OUTPATIENT
Start: 2018-03-10

## 2018-03-18 RX ORDER — ALPRAZOLAM 0.25 MG/1
TABLET ORAL
Qty: 30 TABLET | Refills: 0 | OUTPATIENT
Start: 2018-03-18 | End: 2018-09-22

## 2018-03-24 ENCOUNTER — OFFICE VISIT (OUTPATIENT)
Dept: DERMATOLOGY CLINIC | Facility: CLINIC | Age: 58
End: 2018-03-24

## 2018-03-24 DIAGNOSIS — L81.4 SOLAR LENTIGO: ICD-10-CM

## 2018-03-24 DIAGNOSIS — D23.4 BENIGN NEOPLASM OF SCALP AND SKIN OF NECK: ICD-10-CM

## 2018-03-24 DIAGNOSIS — D23.60 BENIGN NEOPLASM OF SKIN OF UPPER LIMB, INCLUDING SHOULDER, UNSPECIFIED LATERALITY: ICD-10-CM

## 2018-03-24 DIAGNOSIS — D23.5 BENIGN NEOPLASM OF SKIN OF TRUNK, EXCEPT SCROTUM: ICD-10-CM

## 2018-03-24 DIAGNOSIS — L82.1 SEBORRHEIC KERATOSES: Primary | ICD-10-CM

## 2018-03-24 PROCEDURE — 99213 OFFICE O/P EST LOW 20 MIN: CPT | Performed by: DERMATOLOGY

## 2018-03-24 PROCEDURE — 99212 OFFICE O/P EST SF 10 MIN: CPT | Performed by: DERMATOLOGY

## 2018-03-24 RX ORDER — ALPRAZOLAM 0.25 MG/1
TABLET ORAL
Qty: 30 TABLET | Refills: 0 | OUTPATIENT
Start: 2018-03-24

## 2018-03-28 NOTE — TELEPHONE ENCOUNTER
Pharmacy called in to have medication below filled. Pharmacy states they were attempting to get the request to [facility], so with the delay Pt is currently out of medication. Please advise.      Current Outpatient Prescriptions:   •  LORazepam (ATIVAN) 0.5 MG

## 2018-04-02 NOTE — PROGRESS NOTES
Timmy Shone is a 61year old female. HPI:     CC:  Patient presents with:  Upper Body Exam: established pt. presents 15 months skin exam. Pt was adopted, no personal hx of skin CA.  [facility] chiropractor told me there's a spot on my back that needs to b tablet Rfl: 0   FLUTICASONE PROPIONATE 50 MCG/ACT Nasal Suspension USE 1 SPRAY IN EACH NOSTRIL TWO TIMES A DAY (Patient taking differently: USE 1 SPRAY IN EACH NOSTRIL TWO TIMES A DAY PRN) Disp: 1 Bottle Rfl: 3   LORazepam (ATIVAN) 0.5 MG Oral Tab Take 1 t socially    Alcohol use Yes  0.5 oz/week    1 Glasses of wine per week         Comment: weekends a few    Drug use: No    Sexual activity: Not on file     Other Topics Concern    Caffeine Concern Yes    Comment: Coffee, 2 cups daily; Tea;     Pt has a pace also:    Assessment / plan:    No orders of the defined types were placed in this encounter.       Meds & Refills for this Visit:  No prescriptions requested or ordered in this encounter      Seborrheic keratoses  (primary encounter diagnosis)  Benign neopl

## 2018-04-03 NOTE — TELEPHONE ENCOUNTER
Pt has not had ativan ordered since 2015 by Dr Jovana Melgoza. Pt is taking alprazolam now prescribed by Dr Charlene Valera 3/18/18.

## 2018-04-04 NOTE — TELEPHONE ENCOUNTER
Patient callling stating she needs script for LORazepam (ATIVAN) 0.5 MG Oral Tab Take 1 tablet by mouth every 6 (six) hours as needed for Anxiety.     Patient stated she picked up the alprazolam at the pharmacy not knowing that it was not the lorazepam.

## 2018-04-05 RX ORDER — LORAZEPAM 0.5 MG/1
0.5 TABLET ORAL EVERY 6 HOURS PRN
Qty: 60 TABLET | Refills: 0 | OUTPATIENT
Start: 2018-04-05 | End: 2018-12-09

## 2018-04-09 NOTE — TELEPHONE ENCOUNTER
Phone call from Max Ortega at [address] stating patient waiting for refill for Lorazepam. Approved refill per  given to pharmacy for Lorazepam o.5 mg tab, take 1 tabe every 6 hours as needed for sleep, qty # 60 with no additional refills.

## 2018-05-03 ENCOUNTER — HOSPITAL ENCOUNTER (OUTPATIENT)
Age: 58
Discharge: HOME | End: 2018-05-03
Attending: EMERGENCY MEDICINE
Payer: COMMERCIAL

## 2018-05-03 VITALS
WEIGHT: 165 LBS | TEMPERATURE: 98 F | HEIGHT: 68 IN | SYSTOLIC BLOOD PRESSURE: 139 MMHG | RESPIRATION RATE: 16 BRPM | DIASTOLIC BLOOD PRESSURE: 77 MMHG | BODY MASS INDEX: 25.01 KG/M2 | HEART RATE: 93 BPM | OXYGEN SATURATION: 96 %

## 2018-05-03 DIAGNOSIS — J01.90 ACUTE NON-RECURRENT SINUSITIS, UNSPECIFIED LOCATION: ICD-10-CM

## 2018-05-03 DIAGNOSIS — H66.91 ACUTE RIGHT OTITIS MEDIA: Primary | ICD-10-CM

## 2018-05-03 PROCEDURE — 99213 OFFICE O/P EST LOW 20 MIN: CPT

## 2018-05-03 PROCEDURE — 99204 OFFICE O/P NEW MOD 45 MIN: CPT

## 2018-05-03 RX ORDER — AMOXICILLIN AND CLAVULANATE POTASSIUM 875; 125 MG/1; MG/1
1 TABLET, FILM COATED ORAL 2 TIMES DAILY
Qty: 20 TABLET | Refills: 0 | Status: SHIPPED | OUTPATIENT
Start: 2018-05-03 | End: 2018-05-13

## 2018-05-03 RX ORDER — FLUCONAZOLE 150 MG/1
TABLET ORAL
Qty: 2 TABLET | Refills: 0 | Status: SHIPPED | OUTPATIENT
Start: 2018-05-03 | End: 2018-08-27 | Stop reason: ALTCHOICE

## 2018-05-03 RX ORDER — PREDNISONE 20 MG/1
40 TABLET ORAL DAILY
Qty: 6 TABLET | Refills: 0 | Status: SHIPPED | OUTPATIENT
Start: 2018-05-03 | End: 2018-05-06

## 2018-05-03 NOTE — ED PROVIDER NOTES
Patient Seen in: [facility] In [address] [place] [address]    History   Patient presents with:  Cough/URI    Stated Complaint: cough/sinus pressure sore throat    HPI    The patient is a 13-year-old female with a history of allergic rhinitis presents Packs/day: 0.20      Years: 18.00        Types: Cigarettes     Quit date: 1/1/1988  Smokeless tobacco: Never Used                      Comment: socially  Alcohol use: Yes           0.5 oz/week     Glasses of wine: 1 per week     Comment: nicola am    Follow-up:  Obdulia Reece MD  [facility]  [phone]    Schedule an appointment as soon as possible for a visit in 1 week  For Recheck        Medications Prescribed:  Current Discharge Medication List

## 2018-05-03 NOTE — ED INITIAL ASSESSMENT (HPI)
Pt reports cough without phlegm x one week. Throat and R ear irritation since yesterday. Also reports excessive sneezing. Pt states \"I have horrible allergies. \"

## 2018-05-14 ENCOUNTER — OFFICE VISIT (OUTPATIENT)
Dept: INTERNAL MEDICINE CLINIC | Facility: CLINIC | Age: 58
End: 2018-05-14

## 2018-05-14 VITALS
BODY MASS INDEX: 26.83 KG/M2 | SYSTOLIC BLOOD PRESSURE: 108 MMHG | DIASTOLIC BLOOD PRESSURE: 72 MMHG | TEMPERATURE: 97 F | HEART RATE: 78 BPM | WEIGHT: 177 LBS | RESPIRATION RATE: 12 BRPM | HEIGHT: 68 IN

## 2018-05-14 DIAGNOSIS — H66.90 ACUTE OTITIS MEDIA, UNSPECIFIED OTITIS MEDIA TYPE: Primary | ICD-10-CM

## 2018-05-14 DIAGNOSIS — J30.1 ALLERGIC RHINITIS DUE TO POLLEN, UNSPECIFIED SEASONALITY: ICD-10-CM

## 2018-05-14 PROBLEM — M77.8 RIGHT HAND TENDONITIS: Status: RESOLVED | Noted: 2017-09-29 | Resolved: 2018-05-14

## 2018-05-14 PROCEDURE — 99214 OFFICE O/P EST MOD 30 MIN: CPT | Performed by: INTERNAL MEDICINE

## 2018-05-14 PROCEDURE — 99212 OFFICE O/P EST SF 10 MIN: CPT | Performed by: INTERNAL MEDICINE

## 2018-05-14 RX ORDER — FLUTICASONE PROPIONATE 50 MCG
2 SPRAY, SUSPENSION (ML) NASAL DAILY
Qty: 1 BOTTLE | Refills: 3 | Status: SHIPPED | OUTPATIENT
Start: 2018-05-14 | End: 2020-09-15 | Stop reason: ALTCHOICE

## 2018-05-14 NOTE — PROGRESS NOTES
HPI:    Patient ID: Gabriel Huynh is a 61year old female. Sinus Problem   This is a new problem. The current episode started 1 to 4 weeks ago. The problem has been gradually improving since onset. There has been no fever. The pain is mild.  Brittney More appears well-developed and well-nourished. No distress. HENT:   Right Ear: Hearing, tympanic membrane, external ear and ear canal normal. No drainage. Tympanic membrane is not perforated. No hemotympanum. No decreased hearing is noted.    Left Ear: Hearin

## 2018-08-20 ENCOUNTER — TELEPHONE (OUTPATIENT)
Dept: OBGYN CLINIC | Facility: CLINIC | Age: 58
End: 2018-08-20

## 2018-08-20 NOTE — TELEPHONE ENCOUNTER
Pt states she rcvd a message via [employer] stating to call to discuss treatment option, but is unsure of what she's being treated for, would like to speak to nurse.

## 2018-08-20 NOTE — TELEPHONE ENCOUNTER
Informed pt that the message was in regards to her MyChart message she sent on 8/6/18. Offered pt several appts pt declined due only being able to make \"Saturday or after 5:30pm\".  Assisted pt with scheduling consult appt on 10/8/18 at 5:30pm and pt reque

## 2018-08-27 ENCOUNTER — OFFICE VISIT (OUTPATIENT)
Dept: INTERNAL MEDICINE CLINIC | Facility: CLINIC | Age: 58
End: 2018-08-27
Payer: COMMERCIAL

## 2018-08-27 VITALS
DIASTOLIC BLOOD PRESSURE: 82 MMHG | HEIGHT: 68 IN | HEART RATE: 76 BPM | TEMPERATURE: 99 F | WEIGHT: 181 LBS | BODY MASS INDEX: 27.43 KG/M2 | SYSTOLIC BLOOD PRESSURE: 126 MMHG

## 2018-08-27 DIAGNOSIS — Z12.39 BREAST CANCER SCREENING: ICD-10-CM

## 2018-08-27 DIAGNOSIS — Z00.00 ANNUAL PHYSICAL EXAM: Primary | ICD-10-CM

## 2018-08-27 DIAGNOSIS — F41.1 GAD (GENERALIZED ANXIETY DISORDER): ICD-10-CM

## 2018-08-27 PROCEDURE — 99396 PREV VISIT EST AGE 40-64: CPT | Performed by: INTERNAL MEDICINE

## 2018-08-27 NOTE — PROGRESS NOTES
HPI:    Patient ID: Gregg Goncalves is a 60year old female. Presents for her annual physical        Review of Systems   Constitutional: Negative. HENT: Positive for congestion. Eyes: Negative.     Respiratory: Negative for cough, shortness of b Neck supple. No JVD present. No thyromegaly present. Cardiovascular: Normal rate, regular rhythm, normal heart sounds and intact distal pulses. Exam reveals no gallop. No murmur heard.   Pulmonary/Chest: Effort normal and breath sounds normal. No resp [E]    Meds This Visit:  No prescriptions requested or ordered in this encounter    Imaging & Referrals:  ROSI SCREENING BILAT (CPT=[id number])       EM#[id number]

## 2018-09-22 ENCOUNTER — OFFICE VISIT (OUTPATIENT)
Dept: DERMATOLOGY CLINIC | Facility: CLINIC | Age: 58
End: 2018-09-22
Payer: COMMERCIAL

## 2018-09-22 ENCOUNTER — LAB ENCOUNTER (OUTPATIENT)
Dept: LAB | Age: 58
End: 2018-09-22
Attending: INTERNAL MEDICINE
Payer: COMMERCIAL

## 2018-09-22 DIAGNOSIS — D23.4 BENIGN NEOPLASM OF SCALP AND SKIN OF NECK: ICD-10-CM

## 2018-09-22 DIAGNOSIS — D23.30 BENIGN NEOPLASM OF SKIN OF FACE: ICD-10-CM

## 2018-09-22 DIAGNOSIS — D23.9 BENIGN NEOPLASM OF SKIN, UNSPECIFIED LOCATION: ICD-10-CM

## 2018-09-22 DIAGNOSIS — Z00.00 ANNUAL PHYSICAL EXAM: ICD-10-CM

## 2018-09-22 DIAGNOSIS — D23.5 BENIGN NEOPLASM OF SKIN OF TRUNK, EXCEPT SCROTUM: ICD-10-CM

## 2018-09-22 DIAGNOSIS — D23.60 BENIGN NEOPLASM OF SKIN OF UPPER LIMB, INCLUDING SHOULDER, UNSPECIFIED LATERALITY: ICD-10-CM

## 2018-09-22 DIAGNOSIS — L82.1 SEBORRHEIC KERATOSES: Primary | ICD-10-CM

## 2018-09-22 DIAGNOSIS — L81.4 SOLAR LENTIGO: ICD-10-CM

## 2018-09-22 LAB
ALBUMIN SERPL BCP-MCNC: 4.4 G/DL (ref 3.5–4.8)
ALBUMIN/GLOB SERPL: 1.6 {RATIO} (ref 1–2)
ALP SERPL-CCNC: 60 U/L (ref 32–100)
ALT SERPL-CCNC: 16 U/L (ref 14–54)
ANION GAP SERPL CALC-SCNC: 6 MMOL/L (ref 0–18)
AST SERPL-CCNC: 22 U/L (ref 15–41)
BASOPHILS # BLD: 0 K/UL (ref 0–0.2)
BASOPHILS NFR BLD: 1 %
BILIRUB SERPL-MCNC: 0.5 MG/DL (ref 0.3–1.2)
BUN SERPL-MCNC: 11 MG/DL (ref 8–20)
BUN/CREAT SERPL: 13.6 (ref 10–20)
CALCIUM SERPL-MCNC: 9.1 MG/DL (ref 8.5–10.5)
CHLORIDE SERPL-SCNC: 109 MMOL/L (ref 95–110)
CHOLEST SERPL-MCNC: 202 MG/DL (ref 110–200)
CO2 SERPL-SCNC: 25 MMOL/L (ref 22–32)
CREAT SERPL-MCNC: 0.81 MG/DL (ref 0.5–1.5)
EOSINOPHIL # BLD: 0.2 K/UL (ref 0–0.7)
EOSINOPHIL NFR BLD: 3 %
ERYTHROCYTE [DISTWIDTH] IN BLOOD BY AUTOMATED COUNT: 12.5 % (ref 11–15)
GLOBULIN PLAS-MCNC: 2.7 G/DL (ref 2.5–3.7)
GLUCOSE SERPL-MCNC: 88 MG/DL (ref 70–99)
HCT VFR BLD AUTO: 44.4 % (ref 35–48)
HDLC SERPL-MCNC: 68 MG/DL
HGB BLD-MCNC: 14.7 G/DL (ref 12–16)
LDLC SERPL CALC-MCNC: 120 MG/DL (ref 0–99)
LYMPHOCYTES # BLD: 1.4 K/UL (ref 1–4)
LYMPHOCYTES NFR BLD: 27 %
MCH RBC QN AUTO: 30.5 PG (ref 27–32)
MCHC RBC AUTO-ENTMCNC: 33.1 G/DL (ref 32–37)
MCV RBC AUTO: 92 FL (ref 80–100)
MONOCYTES # BLD: 0.5 K/UL (ref 0–1)
MONOCYTES NFR BLD: 9 %
NEUTROPHILS # BLD AUTO: 3.2 K/UL (ref 1.8–7.7)
NEUTROPHILS NFR BLD: 60 %
NONHDLC SERPL-MCNC: 134 MG/DL
OSMOLALITY UR CALC.SUM OF ELEC: 289 MOSM/KG (ref 275–295)
PATIENT FASTING: YES
PLATELET # BLD AUTO: 226 K/UL (ref 140–400)
PMV BLD AUTO: 9.4 FL (ref 7.4–10.3)
POTASSIUM SERPL-SCNC: 4.2 MMOL/L (ref 3.3–5.1)
PROT SERPL-MCNC: 7.1 G/DL (ref 5.9–8.4)
RBC # BLD AUTO: 4.82 M/UL (ref 3.7–5.4)
SODIUM SERPL-SCNC: 140 MMOL/L (ref 136–144)
TRIGL SERPL-MCNC: 69 MG/DL (ref 1–149)
TSH SERPL-ACNC: 1.97 UIU/ML (ref 0.45–5.33)
WBC # BLD AUTO: 5.3 K/UL (ref 4–11)

## 2018-09-22 PROCEDURE — 84443 ASSAY THYROID STIM HORMONE: CPT

## 2018-09-22 PROCEDURE — 36415 COLL VENOUS BLD VENIPUNCTURE: CPT

## 2018-09-22 PROCEDURE — 99213 OFFICE O/P EST LOW 20 MIN: CPT | Performed by: DERMATOLOGY

## 2018-09-22 PROCEDURE — 99212 OFFICE O/P EST SF 10 MIN: CPT | Performed by: DERMATOLOGY

## 2018-09-22 PROCEDURE — 80061 LIPID PANEL: CPT

## 2018-09-22 PROCEDURE — 80053 COMPREHEN METABOLIC PANEL: CPT

## 2018-09-22 PROCEDURE — 82306 VITAMIN D 25 HYDROXY: CPT

## 2018-09-22 PROCEDURE — 85025 COMPLETE CBC W/AUTO DIFF WBC: CPT

## 2018-09-24 LAB — 25(OH)D3 SERPL-MCNC: 38.6 NG/ML (ref 30–100)

## 2018-09-24 RX ORDER — ALPRAZOLAM 0.25 MG/1
TABLET ORAL
Qty: 30 TABLET | Refills: 0 | Status: SHIPPED
Start: 2018-09-24 | End: 2018-12-13

## 2018-09-24 NOTE — TELEPHONE ENCOUNTER
Per printed script from Dr. Emeterio Crystal, approved refill for Alprazolam 0.25 mg tab, qty # 30 called to [address] [place] [address] to [facility]. No additional refills.

## 2018-09-30 NOTE — PROGRESS NOTES
[facility] is a 60year old female.   HPI:     CC:  Patient presents with:  Upper Body Exam: LOV 03/24/18 pt seen for upper body check pt here today for upper body check has a spot of concern on her right temple area, right side of her nose, right s comment: socially    Alcohol use:  Yes      Alcohol/week: 0.5 oz      Types: 1 Glasses of wine per week      Comment: weekends a few    Drug use: No         Current Outpatient Medications:  Fluticasone Propionate 50 MCG/ACT Nasal Suspension 2 sprays by Joe Mccarthy REDUCTION LEFT  2014: REDUCTION OF LARGE BREAST;  Bilateral  05/2014: REDUCTION RIGHT  Social History    Socioeconomic History      Marital status:       Spouse name: Not on file      Number of children: Not on file      Years of education: Not on fi There were no vitals filed for this visit.     HPI:    Patient presents with:  Upper Body Exam: LOV 03/24/18 pt seen for upper body check pt here today for upper body check has a spot of concern on her right temple area, right side of her nose, right scalp and skin of neck  Solar lentigo  Benign neoplasm of skin of upper limb, including shoulder, unspecified laterality  Benign neoplasm of skin of face  Benign neoplasm of skin, unspecified location    See details on map.       Remarkable for:    Lesion o

## 2018-10-08 ENCOUNTER — LAB ENCOUNTER (OUTPATIENT)
Dept: LAB | Facility: HOSPITAL | Age: 58
End: 2018-10-08
Attending: OBSTETRICS & GYNECOLOGY
Payer: COMMERCIAL

## 2018-10-08 ENCOUNTER — OFFICE VISIT (OUTPATIENT)
Dept: OBGYN CLINIC | Facility: CLINIC | Age: 58
End: 2018-10-08
Payer: COMMERCIAL

## 2018-10-08 VITALS
BODY MASS INDEX: 28 KG/M2 | HEART RATE: 80 BPM | DIASTOLIC BLOOD PRESSURE: 78 MMHG | SYSTOLIC BLOOD PRESSURE: 119 MMHG | WEIGHT: 181 LBS

## 2018-10-08 DIAGNOSIS — N95.0 POSTMENOPAUSAL BLEEDING: Primary | ICD-10-CM

## 2018-10-08 DIAGNOSIS — N95.0 POSTMENOPAUSAL BLEEDING: ICD-10-CM

## 2018-10-08 PROCEDURE — 99213 OFFICE O/P EST LOW 20 MIN: CPT | Performed by: OBSTETRICS & GYNECOLOGY

## 2018-10-08 PROCEDURE — 82670 ASSAY OF TOTAL ESTRADIOL: CPT

## 2018-10-08 PROCEDURE — 83001 ASSAY OF GONADOTROPIN (FSH): CPT

## 2018-10-08 PROCEDURE — 83002 ASSAY OF GONADOTROPIN (LH): CPT

## 2018-10-08 PROCEDURE — 36415 COLL VENOUS BLD VENIPUNCTURE: CPT

## 2018-10-08 NOTE — PROGRESS NOTES
Jeannine Carroll    1960       Patient presents with:  Gyn Problem: TREATMENT OPTION  Pt has never gone for more than one year without vaginal bleeding there fore has never been officially menopausal - however since she is 60 it is likely that Bilateral 2014   • REDUCTION RIGHT  05/2014        Pap Date: 06/22/15  Pap Result Notes: Negative Pap/ HPV Negative; Mammo 9-9-17 Benign Bilateral        Current Outpatient Medications on File Prior to Visit:  ALPRAZOLAM 0.25 MG Oral Tab TAKE 1 TABLET BY M

## 2018-10-09 ENCOUNTER — TELEPHONE (OUTPATIENT)
Dept: INTERNAL MEDICINE CLINIC | Facility: CLINIC | Age: 58
End: 2018-10-09

## 2018-10-11 NOTE — TELEPHONE ENCOUNTER
Spoke with pt infromed we did receive form and it is on  desk. Pt voiced understanding. Pt would like a call prior to faxing form back as she has to go near fax machine.

## 2018-10-16 NOTE — TELEPHONE ENCOUNTER
Called pt and asked that we fax form to her. Form was faxed. Pt also would like to  original form on Saturday. I made copy and placed in scanning. Original placed in bin by  staff.

## 2018-11-04 ENCOUNTER — HOSPITAL ENCOUNTER (OUTPATIENT)
Dept: MAMMOGRAPHY | Facility: HOSPITAL | Age: 58
Discharge: HOME | End: 2018-11-04
Attending: INTERNAL MEDICINE
Payer: COMMERCIAL

## 2018-11-04 DIAGNOSIS — Z12.39 BREAST CANCER SCREENING: ICD-10-CM

## 2018-11-04 PROCEDURE — 77067 SCR MAMMO BI INCL CAD: CPT | Performed by: INTERNAL MEDICINE

## 2018-11-04 PROCEDURE — 77063 BREAST TOMOSYNTHESIS BI: CPT | Performed by: INTERNAL MEDICINE

## 2018-11-09 ENCOUNTER — TELEPHONE (OUTPATIENT)
Dept: OBGYN CLINIC | Facility: CLINIC | Age: 58
End: 2018-11-09

## 2018-11-09 NOTE — TELEPHONE ENCOUNTER
Pt was contacted this morning regarding her overdue pelvic u/s test dated 10/8/2018 from [facility]. Pt states she will call today to schedule her pelvic u/s appointment. Pelvic u/s postpone until 12/31/2018. Pt verbalized understanding.

## 2018-11-13 ENCOUNTER — HOSPITAL ENCOUNTER (OUTPATIENT)
Dept: ULTRASOUND IMAGING | Facility: HOSPITAL | Age: 58
Discharge: HOME | End: 2018-11-13
Attending: OBSTETRICS & GYNECOLOGY
Payer: COMMERCIAL

## 2018-11-13 DIAGNOSIS — N95.0 POSTMENOPAUSAL BLEEDING: ICD-10-CM

## 2018-11-13 PROCEDURE — 76830 TRANSVAGINAL US NON-OB: CPT | Performed by: OBSTETRICS & GYNECOLOGY

## 2018-11-13 PROCEDURE — 76856 US EXAM PELVIC COMPLETE: CPT | Performed by: OBSTETRICS & GYNECOLOGY

## 2018-11-20 ENCOUNTER — HOSPITAL ENCOUNTER (OUTPATIENT)
Dept: ULTRASOUND IMAGING | Facility: HOSPITAL | Age: 58
Discharge: HOME | End: 2018-11-20
Attending: INTERNAL MEDICINE
Payer: COMMERCIAL

## 2018-11-20 ENCOUNTER — HOSPITAL ENCOUNTER (OUTPATIENT)
Dept: MAMMOGRAPHY | Facility: HOSPITAL | Age: 58
Discharge: HOME | End: 2018-11-20
Attending: INTERNAL MEDICINE
Payer: COMMERCIAL

## 2018-11-20 DIAGNOSIS — R92.8 ABNORMAL MAMMOGRAM: ICD-10-CM

## 2018-11-20 PROCEDURE — 77065 DX MAMMO INCL CAD UNI: CPT | Performed by: INTERNAL MEDICINE

## 2018-11-20 PROCEDURE — 76642 ULTRASOUND BREAST LIMITED: CPT | Performed by: INTERNAL MEDICINE

## 2018-11-20 PROCEDURE — 77061 BREAST TOMOSYNTHESIS UNI: CPT | Performed by: INTERNAL MEDICINE

## 2018-11-21 ENCOUNTER — TELEPHONE (OUTPATIENT)
Dept: FAMILY MEDICINE CLINIC | Facility: CLINIC | Age: 58
End: 2018-11-21

## 2018-11-21 NOTE — TELEPHONE ENCOUNTER
Regarding: RE: Test Results Question  Contact: [phone]  ----- Message from Roland Oconnell RN sent at 11/19/2018  2:39 PM CST -----       ----- Message sent from Shad Ely RN to Sagrario Fernandomanda at 11/19/2018  2:39 PM -----   Miesha Mcdermott,    I will

## 2018-11-21 NOTE — TELEPHONE ENCOUNTER
Dr Dahiana Hummel, please advise on mammogram/ultrasound results, see patient's request to speak to you.

## 2018-11-21 NOTE — TELEPHONE ENCOUNTER
Informed pt of CAP recs below. Pt states she is scheduled for annual exam on 12/22/18 and pt would like to wait to discuss at that time. Informed pt message will be sent to CAP to see if it ok to wait until then.  Informed pt if CAP wants pt seen sooner we

## 2018-11-21 NOTE — TELEPHONE ENCOUNTER
Pt stts she would like to discuss her current mammogram results and her future condition.  Please advise

## 2018-11-24 ENCOUNTER — TELEPHONE (OUTPATIENT)
Dept: INTERNAL MEDICINE CLINIC | Facility: CLINIC | Age: 58
End: 2018-11-24

## 2018-11-24 DIAGNOSIS — R92.8 ABNORMAL MAMMOGRAM: Primary | ICD-10-CM

## 2018-12-04 ENCOUNTER — HOSPITAL ENCOUNTER (OUTPATIENT)
Dept: MRI IMAGING | Facility: HOSPITAL | Age: 58
Discharge: HOME | End: 2018-12-04
Attending: INTERNAL MEDICINE
Payer: COMMERCIAL

## 2018-12-04 DIAGNOSIS — R92.8 ABNORMAL MAMMOGRAM: ICD-10-CM

## 2018-12-04 PROCEDURE — 0159T MRI BREAST (W+WO) W/CAD BILAT (CPT=77059/0159T): CPT | Performed by: INTERNAL MEDICINE

## 2018-12-04 PROCEDURE — A9575 INJ GADOTERATE MEGLUMI 0.1ML: HCPCS | Performed by: INTERNAL MEDICINE

## 2018-12-04 PROCEDURE — 0159T HC MRI COMPUTER AIDED DETECTION FOR LESION BREAST: CPT

## 2018-12-04 PROCEDURE — 77059 MRI BREAST (W+WO) W/CAD BILAT (CPT=77059/0159T): CPT | Performed by: INTERNAL MEDICINE

## 2018-12-05 ENCOUNTER — TELEPHONE (OUTPATIENT)
Dept: INTERNAL MEDICINE CLINIC | Facility: CLINIC | Age: 58
End: 2018-12-05

## 2018-12-05 DIAGNOSIS — R92.8 ABNORMAL MRI, BREAST: Primary | ICD-10-CM

## 2018-12-05 NOTE — TELEPHONE ENCOUNTER
Pt called in stating that she received a letter from radiology recommending a breast biopsy. Pt would like to discuss with ECL and where it needs to be done. Pt asking if this is something she can have done on Friday, as she is off work. Please advise.

## 2018-12-05 NOTE — TELEPHONE ENCOUNTER
I tried calling pt and not available; cant also leave message since voice mail was full  Ok to order mri guided breast biopsy bilateral breast  Dx abnormal breast mri

## 2018-12-06 ENCOUNTER — TELEPHONE (OUTPATIENT)
Dept: MAMMOGRAPHY | Facility: HOSPITAL | Age: 58
End: 2018-12-06

## 2018-12-06 ENCOUNTER — TELEPHONE (OUTPATIENT)
Dept: INTERNAL MEDICINE CLINIC | Facility: CLINIC | Age: 58
End: 2018-12-06

## 2018-12-06 NOTE — IMAGING NOTE
Called  Mrs Simona Wyatt to Discussed recommended bilateral mri  breast biopsy with patient made by Dr Remigio Mckoy.  Banerjee Ao only aware of left breast biopsy not right read report to her.  She was informed that due to location we may be unable to do both br mammogram 11/20 . At that time asymmetry and distortion was noted  Mri was ordered and bilat bx recommended   Pt history of biopsy: Yes 3-4 x on rt breast all benign       Family history of cancer: ?  Was adopted doesn't know hx   Pt history of breast canc are able to drive themselves home if they wish. Educated patient that some soreness may occur after biopsy. Discussed use of a supportive bra and ice packs after procedure, to decrease soreness.     Discussed with patient no swimming, bathing, or submer

## 2018-12-06 NOTE — TELEPHONE ENCOUNTER
Biopsy ordered per written order below. [employer] message sent to pateint to notify of order with scheduling phone number.

## 2018-12-10 RX ORDER — LORAZEPAM 0.5 MG/1
TABLET ORAL
Qty: 60 TABLET | Refills: 0 | Status: SHIPPED
Start: 2018-12-10 | End: 2019-04-15

## 2018-12-10 NOTE — TELEPHONE ENCOUNTER
Per printed script from Dr.. Watson, refill called to [address] [facility] [address] for Lorazepam 0.5 mg tab, qty # 60 with no additional refills. Take one every 6 hours as needed for anxiety.

## 2018-12-11 RX ORDER — ALPRAZOLAM 0.25 MG/1
TABLET ORAL
Qty: 30 TABLET | Refills: 0 | Status: CANCELLED | OUTPATIENT
Start: 2018-12-11

## 2018-12-11 NOTE — TELEPHONE ENCOUNTER
Spoke with patient who reports she keeps her lorazepam and alprazolam in the same bottle and she accidentally requested for the lorazepam to get refilled instead if the alprazolam. Patient reports she did  the lorazepam yesterday 12/10/18 and she tried to return them to the pharmacy, but the pharmacy would not take them back. She reports she only uses the lorazepam when she can not sleep. Patient is requesting a refill on the alprazolam. Routed to provider.

## 2018-12-11 NOTE — TELEPHONE ENCOUNTER
pls check with pt; shouldn't really be on both lorazepam and alprazolam. We just refilled her lorazepam yesterday.

## 2018-12-13 NOTE — PROGRESS NOTES
HPI:    Patient ID: Katie Lipkayode is a 60year old female. Patient presents today in the clinic complaining of increasing anxiety. She had abnormal MRI breast a week ago and will be having breast biopsy done by next week.  On top of this, she had be Bottle Rfl: 3   Fexofenadine HCl (MUCINEX ALLERGY OR) Take by mouth. Disp:  Rfl:    PATANOL 0.1 % Ophthalmic Solution Place 1 drop into both eyes daily as needed.    Disp:  Rfl:      Allergies:  Cephalosporins          HIVES  Sulfamethoxazole        HIVES

## 2018-12-16 RX ORDER — ALPRAZOLAM 0.25 MG/1
TABLET ORAL
Qty: 30 TABLET | Refills: 0
Start: 2018-12-16

## 2018-12-20 ENCOUNTER — HOSPITAL ENCOUNTER (OUTPATIENT)
Dept: MRI IMAGING | Facility: HOSPITAL | Age: 58
Discharge: HOME | End: 2018-12-20
Attending: INTERNAL MEDICINE
Payer: COMMERCIAL

## 2018-12-20 ENCOUNTER — HOSPITAL ENCOUNTER (OUTPATIENT)
Dept: MAMMOGRAPHY | Facility: HOSPITAL | Age: 58
Discharge: HOME | End: 2018-12-20
Attending: INTERNAL MEDICINE
Payer: COMMERCIAL

## 2018-12-20 VITALS
RESPIRATION RATE: 16 BRPM | BODY MASS INDEX: 25.76 KG/M2 | HEIGHT: 68 IN | OXYGEN SATURATION: 97 % | DIASTOLIC BLOOD PRESSURE: 79 MMHG | SYSTOLIC BLOOD PRESSURE: 127 MMHG | HEART RATE: 92 BPM | WEIGHT: 170 LBS

## 2018-12-20 DIAGNOSIS — R92.8 ABNORMAL MRI, BREAST: ICD-10-CM

## 2018-12-20 PROCEDURE — 88342 IMHCHEM/IMCYTCHM 1ST ANTB: CPT | Performed by: INTERNAL MEDICINE

## 2018-12-20 PROCEDURE — 19085 BX BREAST 1ST LESION MR IMAG: CPT | Performed by: INTERNAL MEDICINE

## 2018-12-20 PROCEDURE — A9575 INJ GADOTERATE MEGLUMI 0.1ML: HCPCS | Performed by: INTERNAL MEDICINE

## 2018-12-20 PROCEDURE — 88341 IMHCHEM/IMCYTCHM EA ADD ANTB: CPT | Performed by: INTERNAL MEDICINE

## 2018-12-20 PROCEDURE — 88360 TUMOR IMMUNOHISTOCHEM/MANUAL: CPT | Performed by: INTERNAL MEDICINE

## 2018-12-20 PROCEDURE — 19086 BX BREAST ADD LESION MR IMAG: CPT | Performed by: INTERNAL MEDICINE

## 2018-12-20 PROCEDURE — 88305 TISSUE EXAM BY PATHOLOGIST: CPT | Performed by: INTERNAL MEDICINE

## 2018-12-20 PROCEDURE — 77065 DX MAMMO INCL CAD UNI: CPT | Performed by: INTERNAL MEDICINE

## 2018-12-20 RX ORDER — LIDOCAINE HYDROCHLORIDE AND EPINEPHRINE 10; 10 MG/ML; UG/ML
20 INJECTION, SOLUTION INFILTRATION; PERINEURAL ONCE
Status: COMPLETED | OUTPATIENT
Start: 2018-12-20 | End: 2018-12-20

## 2018-12-20 RX ORDER — LIDOCAINE HYDROCHLORIDE AND EPINEPHRINE 10; 10 MG/ML; UG/ML
INJECTION, SOLUTION INFILTRATION; PERINEURAL
Status: COMPLETED
Start: 2018-12-20 | End: 2018-12-20

## 2018-12-20 RX ORDER — LIDOCAINE HYDROCHLORIDE 10 MG/ML
INJECTION, SOLUTION EPIDURAL; INFILTRATION; INTRACAUDAL; PERINEURAL
Status: COMPLETED
Start: 2018-12-20 | End: 2018-12-20

## 2018-12-20 RX ORDER — SODIUM CHLORIDE 9 MG/ML
INJECTION, SOLUTION INTRAVENOUS ONCE
Status: COMPLETED | OUTPATIENT
Start: 2018-12-20 | End: 2018-12-20

## 2018-12-20 RX ORDER — SODIUM CHLORIDE 9 MG/ML
INJECTION, SOLUTION INTRAVENOUS
Status: COMPLETED
Start: 2018-12-20 | End: 2018-12-20

## 2018-12-20 RX ORDER — LIDOCAINE HYDROCHLORIDE 10 MG/ML
15 INJECTION, SOLUTION EPIDURAL; INFILTRATION; INTRACAUDAL; PERINEURAL ONCE
Status: COMPLETED | OUTPATIENT
Start: 2018-12-20 | End: 2018-12-20

## 2018-12-20 RX ADMIN — LIDOCAINE HYDROCHLORIDE AND EPINEPHRINE 7 ML: 10; 10 INJECTION, SOLUTION INFILTRATION; PERINEURAL at 09:25:00

## 2018-12-20 RX ADMIN — LIDOCAINE HYDROCHLORIDE 5 ML: 10 INJECTION, SOLUTION EPIDURAL; INFILTRATION; INTRACAUDAL; PERINEURAL at 09:23:00

## 2018-12-20 RX ADMIN — SODIUM CHLORIDE: 9 INJECTION, SOLUTION INTRAVENOUS at 09:15:00

## 2018-12-20 NOTE — PROCEDURES
[place] [place] [facility] - [facility]  Procedure Note    Obdulio Corcoran Patient Status:  Outpatient    1960 MRN [id number]   Location [facility] MRI Attending Hamilton Sparks MD   Hosp Day # 0 PCP Tatyana Cobb MD     Procedure: Left b

## 2018-12-20 NOTE — IMAGING NOTE
To Radiology holding area hx as follow:  The 1.9 x 1.5 x 1.7 cm focal clumped non mass enhancement in the left upper inner mid to posterior breast likely corresponds to the architectural distortion seen on mammography.  This is at moderate suspicion for mal

## 2018-12-22 ENCOUNTER — OFFICE VISIT (OUTPATIENT)
Dept: OBGYN CLINIC | Facility: CLINIC | Age: 58
End: 2018-12-22
Payer: COMMERCIAL

## 2018-12-22 VITALS
DIASTOLIC BLOOD PRESSURE: 82 MMHG | SYSTOLIC BLOOD PRESSURE: 129 MMHG | HEIGHT: 68 IN | HEART RATE: 80 BPM | WEIGHT: 174 LBS | BODY MASS INDEX: 26.37 KG/M2

## 2018-12-22 DIAGNOSIS — R92.8 ABNORMAL MAMMOGRAM: Primary | ICD-10-CM

## 2018-12-22 DIAGNOSIS — Z01.419 ENCOUNTER FOR GYNECOLOGICAL EXAMINATION WITHOUT ABNORMAL FINDING: ICD-10-CM

## 2018-12-22 DIAGNOSIS — Z12.4 SCREENING FOR MALIGNANT NEOPLASM OF THE CERVIX: ICD-10-CM

## 2018-12-22 PROCEDURE — 99396 PREV VISIT EST AGE 40-64: CPT | Performed by: OBSTETRICS & GYNECOLOGY

## 2018-12-23 NOTE — PROGRESS NOTES
Alexandra Gaona is a 60year old female [id number] No LMP recorded. Patient is not currently having periods (Reason: Menopause).  who presents for Patient presents with:  Gyn Exam: ANNUAL  Pt is tearful today due to she is waiting for a breast biopsy on Mon Transportation needs - non-medical: Not on file    Occupational History      Not on file    Tobacco Use      Smoking status: Former Smoker        Packs/day: 0.20        Years: 18.00        Pack years: 3.6        Types: Cigarettes        Quit date: 1/1/1988 Ophthalmic Solution, Place 1 drop into both eyes daily as needed.   , Disp: , Rfl:     ALLERGIES:    Cephalosporins          HIVES  Sulfamethoxazole        HIVES  Trimethoprim            HIVES  Dust Mite Extract       ITCHING  Food                    ITCHIN lesions  Urethral Meatus:  normal in size, location, without lesions and prolapse  Bladder:  No fullness, masses or tenderness  Vagina:  Normal appearance without lesions, no abnormal discharge  Cervix:  Normal without tenderness on motion  Uterus: normal

## 2018-12-24 ENCOUNTER — HOSPITAL ENCOUNTER (OUTPATIENT)
Dept: MAMMOGRAPHY | Facility: HOSPITAL | Age: 58
Discharge: HOME | End: 2018-12-24
Attending: INTERNAL MEDICINE
Payer: COMMERCIAL

## 2018-12-24 ENCOUNTER — HOSPITAL ENCOUNTER (OUTPATIENT)
Dept: MRI IMAGING | Facility: HOSPITAL | Age: 58
Discharge: HOME | End: 2018-12-24
Attending: INTERNAL MEDICINE
Payer: COMMERCIAL

## 2018-12-24 VITALS
HEIGHT: 64 IN | SYSTOLIC BLOOD PRESSURE: 146 MMHG | DIASTOLIC BLOOD PRESSURE: 78 MMHG | RESPIRATION RATE: 18 BRPM | WEIGHT: 174 LBS | BODY MASS INDEX: 29.71 KG/M2 | HEART RATE: 75 BPM

## 2018-12-24 DIAGNOSIS — R92.8 ABNORMAL MRI, BREAST: ICD-10-CM

## 2018-12-24 PROCEDURE — 19085 BX BREAST 1ST LESION MR IMAG: CPT | Performed by: INTERNAL MEDICINE

## 2018-12-24 PROCEDURE — 88305 TISSUE EXAM BY PATHOLOGIST: CPT | Performed by: INTERNAL MEDICINE

## 2018-12-24 PROCEDURE — 77065 DX MAMMO INCL CAD UNI: CPT | Performed by: INTERNAL MEDICINE

## 2018-12-24 PROCEDURE — 19086 BX BREAST ADD LESION MR IMAG: CPT | Performed by: INTERNAL MEDICINE

## 2018-12-24 RX ORDER — SODIUM CHLORIDE 9 MG/ML
INJECTION, SOLUTION INTRAVENOUS
Status: DISPENSED
Start: 2018-12-24 | End: 2018-12-24

## 2018-12-24 RX ORDER — LIDOCAINE HYDROCHLORIDE 10 MG/ML
INJECTION, SOLUTION EPIDURAL; INFILTRATION; INTRACAUDAL; PERINEURAL
Status: COMPLETED
Start: 2018-12-24 | End: 2018-12-24

## 2018-12-24 RX ORDER — LIDOCAINE HYDROCHLORIDE AND EPINEPHRINE 10; 10 MG/ML; UG/ML
20 INJECTION, SOLUTION INFILTRATION; PERINEURAL ONCE
Status: COMPLETED | OUTPATIENT
Start: 2018-12-24 | End: 2018-12-24

## 2018-12-24 RX ORDER — LIDOCAINE HYDROCHLORIDE 10 MG/ML
1 INJECTION, SOLUTION EPIDURAL; INFILTRATION; INTRACAUDAL; PERINEURAL ONCE
Status: COMPLETED | OUTPATIENT
Start: 2018-12-24 | End: 2018-12-24

## 2018-12-24 RX ADMIN — LIDOCAINE HYDROCHLORIDE AND EPINEPHRINE 10 ML: 10; 10 INJECTION, SOLUTION INFILTRATION; PERINEURAL at 09:47:00

## 2018-12-24 RX ADMIN — LIDOCAINE HYDROCHLORIDE 6 ML: 10 INJECTION, SOLUTION EPIDURAL; INFILTRATION; INTRACAUDAL; PERINEURAL at 09:47:00

## 2018-12-24 NOTE — PROCEDURES
[facility]  Procedure Note    Estil Kelley Patient Status:  Outpatient    1960 MRN [id number]   Location [facility] Attending Rene Cardenas MD   Hosp Day # 0 PCP Teto Geller MD     Procedure: Denita Patch

## 2018-12-24 NOTE — IMAGING NOTE
0830: To Radiology holding area hx as follow: Family history of cancer: ? Was adopted doesn't know hx. Pt history of breast cancer: no.   0940:  IV 22 gauge started by Isaiah Holman: Dr. Aruna Christianson here procedure explained questions answered.     [address] [place] [address] [place]

## 2018-12-26 ENCOUNTER — TELEPHONE (OUTPATIENT)
Dept: INTERNAL MEDICINE CLINIC | Facility: CLINIC | Age: 58
End: 2018-12-26

## 2018-12-26 ENCOUNTER — TELEPHONE (OUTPATIENT)
Dept: HEMATOLOGY/ONCOLOGY | Facility: HOSPITAL | Age: 58
End: 2018-12-26

## 2018-12-26 NOTE — TELEPHONE ENCOUNTER
Message # [id number]          2018 03:25p   [MONAM]  To:  From:  SREEDHAR Fang MD:  Phone#:  ----------------------------------------------------------------------  [facility] PATHOLOGY DR Bertrand Benjamin [phone] [address],    1960; RESULTS  Pa

## 2018-12-26 NOTE — TELEPHONE ENCOUNTER
Phoned patient to provide appointment information for Dr. Jose Eduardo Herrmann. Appointment scheduled for 1/2/19 at 2pm.  Patient acknowledged and denied questions.

## 2018-12-26 NOTE — TELEPHONE ENCOUNTER
Patient phoned Breast RN Navigator requesting assistance with care coordination. Patient is s/p bilateral MRI guided biopsy.   Patient has spoken with Dr. José Luis Jackson regarding the pathology results, which indicate an infiltrating ductal carcinoma to the le

## 2018-12-27 NOTE — TELEPHONE ENCOUNTER
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 Logged for processing.  NK

## 2018-12-28 ENCOUNTER — TELEPHONE (OUTPATIENT)
Dept: SURGERY | Facility: CLINIC | Age: 58
End: 2018-12-28

## 2018-12-31 ENCOUNTER — APPOINTMENT (OUTPATIENT)
Dept: HEMATOLOGY/ONCOLOGY | Facility: HOSPITAL | Age: 58
End: 2018-12-31
Attending: INTERNAL MEDICINE
Payer: COMMERCIAL

## 2018-12-31 ENCOUNTER — TELEPHONE (OUTPATIENT)
Dept: INTERNAL MEDICINE CLINIC | Facility: CLINIC | Age: 58
End: 2018-12-31

## 2018-12-31 ENCOUNTER — OFFICE VISIT (OUTPATIENT)
Dept: SURGERY | Facility: CLINIC | Age: 58
End: 2018-12-31
Payer: COMMERCIAL

## 2018-12-31 VITALS
BODY MASS INDEX: 27.89 KG/M2 | OXYGEN SATURATION: 98 % | SYSTOLIC BLOOD PRESSURE: 122 MMHG | WEIGHT: 184 LBS | DIASTOLIC BLOOD PRESSURE: 83 MMHG | HEART RATE: 85 BPM | HEIGHT: 68 IN | RESPIRATION RATE: 18 BRPM

## 2018-12-31 DIAGNOSIS — Z17.0 MALIGNANT NEOPLASM OF UPPER-INNER QUADRANT OF LEFT BREAST IN FEMALE, ESTROGEN RECEPTOR POSITIVE (HCC): Primary | ICD-10-CM

## 2018-12-31 DIAGNOSIS — C50.212 MALIGNANT NEOPLASM OF UPPER-INNER QUADRANT OF LEFT BREAST IN FEMALE, ESTROGEN RECEPTOR POSITIVE (HCC): Primary | ICD-10-CM

## 2018-12-31 PROCEDURE — 99215 OFFICE O/P EST HI 40 MIN: CPT | Performed by: SURGERY

## 2018-12-31 NOTE — PATIENT INSTRUCTIONS
Surgery: Left partial mastectomy with wire localization and sentinel lymph node biopsy    Date of Surgery: TBD    Hosptial:    [address] [facility]   Phone: [phone]    · This is an: Outpatient procedure.   · U your surgery and ask if him/her will need to see you prior to surgery. · If this is an inpatient surgery, attending the [address] Surgical Oncology Pre-operative Education Class is strongly encouraged. Email [email or web address] to RSVP or for more class

## 2018-12-31 NOTE — CONSULTS
[facility]    Patient Name:  Isaías Gee   YOB: 1960   Gender:  Female   Appt Date:  12/31/2018   Provider:  Kendra Stock MD   Insurance:  Sagrario Fall MD On 11/4/2018, patient underwent a screening mammogram revealing asymmetry with questionable distortion in the inner posterior left breast.  On 11/20/2018 she underwent diagnostic imaging revealing Left breast upper-inner architectural distortion without so •  LORAZEPAM 0.5 MG Oral Tab, TAKE ONE TABLET BY MOUTH EVERY SIX HOURS AS NEEDED FOR ANXIETY, Disp: 60 tablet, Rfl: 0  •  Fluticasone Propionate 50 MCG/ACT Nasal Suspension, 2 sprays by Nasal route daily. , Disp: 1 Bottle, Rfl: 3  •  Fexofenadine HCl (MUCIN Quit date: 1988        Years since quittin.0      Smokeless tobacco: Never Used      Tobacco comment: socially    Alcohol use: Yes      Alcohol/week: 0.5 oz      Types: 1 Glasses of wine per week      Comment: weekends a few    Drug use:  No MRI: The 1.9 x 1.5 x 1.7 cm focal clumped non mass enhancement in the left upper inner mid to posterior breast likely corresponds to the architectural distortion seen on mammography.        Procedure(s):  None     Assessment / Plan:  (C50.212,  Z17.0) Malig

## 2019-01-02 ENCOUNTER — TELEPHONE (OUTPATIENT)
Dept: SURGERY | Facility: CLINIC | Age: 59
End: 2019-01-02

## 2019-01-02 DIAGNOSIS — Z17.0 MALIGNANT NEOPLASM OF UPPER-INNER QUADRANT OF LEFT BREAST IN FEMALE, ESTROGEN RECEPTOR POSITIVE (HCC): Primary | ICD-10-CM

## 2019-01-02 DIAGNOSIS — C50.212 MALIGNANT NEOPLASM OF UPPER-INNER QUADRANT OF LEFT BREAST IN FEMALE, ESTROGEN RECEPTOR POSITIVE (HCC): Primary | ICD-10-CM

## 2019-01-02 NOTE — TELEPHONE ENCOUNTER
Patient confirmed tentative surgery date of 1/14/19 at [facility]. She is pursuing a second opinion at [facility] and will notify us with any changes.

## 2019-01-07 DIAGNOSIS — C50.212 MALIGNANT NEOPLASM OF UPPER-INNER QUADRANT OF LEFT BREAST IN FEMALE, ESTROGEN RECEPTOR POSITIVE (HCC): Primary | ICD-10-CM

## 2019-01-07 DIAGNOSIS — Z17.0 MALIGNANT NEOPLASM OF UPPER-INNER QUADRANT OF LEFT BREAST IN FEMALE, ESTROGEN RECEPTOR POSITIVE (HCC): Primary | ICD-10-CM

## 2019-01-08 DIAGNOSIS — C50.212 MALIGNANT NEOPLASM OF UPPER-INNER QUADRANT OF LEFT BREAST IN FEMALE, ESTROGEN RECEPTOR POSITIVE (HCC): Primary | ICD-10-CM

## 2019-01-08 DIAGNOSIS — Z17.0 MALIGNANT NEOPLASM OF UPPER-INNER QUADRANT OF LEFT BREAST IN FEMALE, ESTROGEN RECEPTOR POSITIVE (HCC): Primary | ICD-10-CM

## 2019-01-09 ENCOUNTER — TELEPHONE (OUTPATIENT)
Dept: SURGERY | Facility: CLINIC | Age: 59
End: 2019-01-09

## 2019-01-10 ENCOUNTER — TELEPHONE (OUTPATIENT)
Dept: CT IMAGING | Facility: HOSPITAL | Age: 59
End: 2019-01-10

## 2019-01-10 DIAGNOSIS — Z17.0 MALIGNANT NEOPLASM OF UPPER-INNER QUADRANT OF LEFT BREAST IN FEMALE, ESTROGEN RECEPTOR POSITIVE (HCC): Primary | ICD-10-CM

## 2019-01-10 DIAGNOSIS — C50.212 MALIGNANT NEOPLASM OF UPPER-INNER QUADRANT OF LEFT BREAST IN FEMALE, ESTROGEN RECEPTOR POSITIVE (HCC): Primary | ICD-10-CM

## 2019-01-10 NOTE — TELEPHONE ENCOUNTER
Spoke with Isaías Gee regarding [place] Lymph Node mapping which will be done in nuclear medicine department before PM/SLNB of LB surgery scheduled for 1/15/19 w Dr. Wilfredo Durham. Procedure explained and all questions answered.  Verbal education about lym

## 2019-01-10 NOTE — TELEPHONE ENCOUNTER
ALBANMM informing pt that her surgery will need to be moved to 01/15/19 at [facility]. Requested call back to confirm if pt is agreement with date and location change.

## 2019-01-10 NOTE — TELEPHONE ENCOUNTER
Pt left message confirming she is in agreement with moving surgery to 1/15/19 at [facility].

## 2019-01-10 NOTE — TELEPHONE ENCOUNTER
LVMM for patient requesting to move surgery to 1/15/18 at the [facility] location. Requested call back from patient to confirm change.

## 2019-01-11 DIAGNOSIS — C50.212 MALIGNANT NEOPLASM OF UPPER-INNER QUADRANT OF LEFT BREAST IN FEMALE, ESTROGEN RECEPTOR POSITIVE (HCC): Primary | ICD-10-CM

## 2019-01-11 DIAGNOSIS — Z17.0 MALIGNANT NEOPLASM OF UPPER-INNER QUADRANT OF LEFT BREAST IN FEMALE, ESTROGEN RECEPTOR POSITIVE (HCC): Primary | ICD-10-CM

## 2019-02-13 ENCOUNTER — OFFICE VISIT (OUTPATIENT)
Dept: DERMATOLOGY CLINIC | Facility: CLINIC | Age: 59
End: 2019-02-13
Payer: COMMERCIAL

## 2019-02-13 VITALS — WEIGHT: 174 LBS | HEIGHT: 68 IN | BODY MASS INDEX: 26.37 KG/M2

## 2019-02-13 DIAGNOSIS — D23.5 BENIGN NEOPLASM OF SKIN OF TRUNK, EXCEPT SCROTUM: ICD-10-CM

## 2019-02-13 DIAGNOSIS — D23.60 BENIGN NEOPLASM OF SKIN OF UPPER LIMB, INCLUDING SHOULDER, UNSPECIFIED LATERALITY: ICD-10-CM

## 2019-02-13 DIAGNOSIS — D23.30 BENIGN NEOPLASM OF SKIN OF FACE: ICD-10-CM

## 2019-02-13 DIAGNOSIS — L81.4 SOLAR LENTIGO: ICD-10-CM

## 2019-02-13 DIAGNOSIS — L82.1 SEBORRHEIC KERATOSES: ICD-10-CM

## 2019-02-13 DIAGNOSIS — D23.4 BENIGN NEOPLASM OF SCALP AND SKIN OF NECK: ICD-10-CM

## 2019-02-13 DIAGNOSIS — L82.1 VERRUCOUS KERATOSIS: Primary | ICD-10-CM

## 2019-02-13 PROCEDURE — 99213 OFFICE O/P EST LOW 20 MIN: CPT | Performed by: DERMATOLOGY

## 2019-02-13 PROCEDURE — 99212 OFFICE O/P EST SF 10 MIN: CPT | Performed by: DERMATOLOGY

## 2019-02-13 PROCEDURE — 17110 DESTRUCTION B9 LES UP TO 14: CPT | Performed by: DERMATOLOGY

## 2019-02-23 NOTE — LETTER
4/15/2019              Norris Pascual        [facility]        [address] [address]         To Whom It May Concern,    Patient Oscar Dawkins is currently my patient and under my care.  Moon Cox is resuming her anxiety classes on April 15, 2019 Normal vision: sees adequately in most situations; can see medication labels, newsprint

## 2019-02-25 NOTE — PROGRESS NOTES
Eddie Castañeda is a 60year old female. HPI:     CC:  Patient presents with:  Lesion: right forearm, raised, crusty. Denies bleeding or drainage. Improved today. x 1.5 weeks  Lesion: dark lesion left temple. Altered sensation, mild crusting.   Recen week      Comment: weekends a few    Drug use: No         Current Outpatient Medications:  ALPRAZolam 0.25 MG Oral Tab TAKE 1 TABLET BY MOUTH TWICE DAILY AS NEEDED FOR ANXIETY Disp: 30 tablet Rfl: 0   LORAZEPAM 0.5 MG Oral Tab TAKE ONE TABLET BY MOUTH EVER REDUCTION RIGHT  05/2014     Social History    Socioeconomic History      Marital status:       Spouse name: Not on file      Number of children: Not on file      Years of education: Not on file      Highest education level: Not on file    Occupatio Weight Concern: Not Asked        Special Diet: Not Asked        Back Care: Not Asked        Exercise: Not Asked        Bike Helmet: Not Asked        Seat Belt: Not Asked        Self-Exams: Not Asked        Grew up on a farm: Not Asked        History of scattered. See map today's date for lesions noted . Otherwise remarkable for lesions as noted on map.   See details of examination  See Assessment /Plan for additional history and physical exam also:    Assessment / plan:    No orders of the defined keratoses, cherry angiomas:  Reassurance regarding other benign skin lesions. Signs and symptoms of skin cancer, ABCDE's of melanoma discussed with patient. Sunscreen use, sun protection, self exams reviewed.   Followup as noted RTC routine checkup 6 mos -

## 2019-04-01 NOTE — TELEPHONE ENCOUNTER
Controlled medication pending for review. If approved needs to be called in or faxed by on-site staff.     Last Rx: 12/13/2018  LOV: 12/13/2019    Please respond to pool: ARNALDO SAL LPN/COLTON

## 2019-04-02 ENCOUNTER — TELEPHONE (OUTPATIENT)
Dept: INTERNAL MEDICINE CLINIC | Facility: CLINIC | Age: 59
End: 2019-04-02

## 2019-04-02 ENCOUNTER — TELEPHONE (OUTPATIENT)
Dept: ADMINISTRATIVE | Age: 59
End: 2019-04-02

## 2019-04-02 RX ORDER — ALPRAZOLAM 0.25 MG/1
TABLET ORAL
Qty: 30 TABLET | Refills: 0 | Status: SHIPPED
Start: 2019-04-02 | End: 2019-05-13

## 2019-04-02 NOTE — TELEPHONE ENCOUNTER
4/2/19 Alprazolam 0.25mg take         One tablet 2 times daily as needed for anxiety #30 no refills.

## 2019-04-03 NOTE — TELEPHONE ENCOUNTER
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 Logged for processing.  NK

## 2019-04-12 ENCOUNTER — PATIENT MESSAGE (OUTPATIENT)
Dept: INTERNAL MEDICINE CLINIC | Facility: CLINIC | Age: 59
End: 2019-04-12

## 2019-04-12 ENCOUNTER — TELEPHONE (OUTPATIENT)
Dept: INTERNAL MEDICINE CLINIC | Facility: CLINIC | Age: 59
End: 2019-04-12

## 2019-04-12 NOTE — TELEPHONE ENCOUNTER
Patient came to [facility] and dropped off Person with breast or cervical cancer form to be completed form is in [facility]   TRIPP box to be completed. Please Call Patient when is done.  Please advice

## 2019-04-12 NOTE — TELEPHONE ENCOUNTER
Received a call from the patient who is requesting Dr. Deny Galeano to write a letter stating she is resuming her anxiety classes on Monday 4/15/19 and they end on 5/3/19. On the letter is needs to indicate that the patient will be off of work for another 3.

## 2019-04-12 NOTE — TELEPHONE ENCOUNTER
Dr Watson=please see message and advise. From: Eddie Castañeda  To:  Sindhu Kaur MD  Sent: 4/12/2019  9:21 AM CDT  Subject: Non-Urgent Medical Question    Good morning Dr. Kandice Douglas,    I will be dropping off a paper that needs to be

## 2019-04-22 PROBLEM — F41.0 PANIC DISORDER WITHOUT AGORAPHOBIA: Status: ACTIVE | Noted: 2019-04-22

## 2019-04-22 PROBLEM — F32.1 DEPRESSION, MAJOR, SINGLE EPISODE, MODERATE (HCC): Status: ACTIVE | Noted: 2019-04-22

## 2019-05-13 RX ORDER — ALPRAZOLAM 0.25 MG/1
TABLET ORAL
Qty: 30 TABLET | Refills: 0 | Status: SHIPPED
Start: 2019-05-13 | End: 2019-06-29

## 2019-05-14 NOTE — TELEPHONE ENCOUNTER
Controlled medications pending for review. If approved needs to be called in or faxed by on site staff.     Last Rx: 4-2-19 # 30  LOV: 12-13-18    Requested Prescriptions     Pending Prescriptions Disp Refills   • ALPRAZOLAM 0.25 MG Oral Tab [Pharmacy Med N

## 2019-05-14 NOTE — TELEPHONE ENCOUNTER
Per printed script from Gita Del Toro, refill for Alprazolam 0.25 mg tab, qty # 30. Take one tab twice daily as needed for anxiety called to [facility], pharmacist at [facility]. No additional refills.

## 2019-06-29 RX ORDER — ALPRAZOLAM 0.25 MG/1
TABLET ORAL
Qty: 30 TABLET | Refills: 0 | Status: SHIPPED
Start: 2019-06-29 | End: 2019-08-28

## 2019-06-29 NOTE — TELEPHONE ENCOUNTER
Per printed script from Gita Del Toro, [facility] [place] for Alprazolam 0.25 mg tab, qty # 30. Take one tab twice daily as needed for anxiety. No additional refills.

## 2019-06-29 NOTE — TELEPHONE ENCOUNTER
Controlled medications pending for review. If approved needs to be called in or faxed by on site staff.     Last Rx: 5-13-19 # 30  LOV: 12-13-18    Requested Prescriptions     Pending Prescriptions Disp Refills   • ALPRAZOLAM 0.25 MG Oral Tab [Pharmacy Med

## 2019-07-15 ENCOUNTER — OFFICE VISIT (OUTPATIENT)
Dept: INTERNAL MEDICINE CLINIC | Facility: CLINIC | Age: 59
End: 2019-07-15
Payer: MEDICAID

## 2019-07-15 VITALS
DIASTOLIC BLOOD PRESSURE: 74 MMHG | SYSTOLIC BLOOD PRESSURE: 115 MMHG | HEIGHT: 68 IN | BODY MASS INDEX: 27 KG/M2 | TEMPERATURE: 99 F | HEART RATE: 85 BPM

## 2019-07-15 DIAGNOSIS — R22.1 NECK MASS: Primary | ICD-10-CM

## 2019-07-15 PROBLEM — H66.90 ACUTE OTITIS MEDIA: Status: RESOLVED | Noted: 2018-05-14 | Resolved: 2019-07-15

## 2019-07-15 PROCEDURE — 99214 OFFICE O/P EST MOD 30 MIN: CPT | Performed by: INTERNAL MEDICINE

## 2019-07-15 NOTE — PROGRESS NOTES
HPI:    Patient ID: Dorie Frias is a 60year old female. Mass   This is a new problem. The current episode started in the past 7 days (4 to 5 days was just noted by son ). The problem occurs constantly. The problem has been unchanged.  Associated HIVES  Trimethoprim            HIVES  Milk-Related Compou*    PAIN    Comment:Stomach pain, diarrhea  Dust Mite Extract       ITCHING  Penicillins             HIVES, RASH   PHYSICAL EXAM:   Physical Exam   Constitutional: She appears well-developed and solid mass on the lower neck anteriorly, of sudden onset last week with no assoc signs or symptoms. This had remained stable since then. She is anxious given her hx of breast cancer. We will get ct scan of neck for  Further evaluation.          No orders

## 2019-07-17 ENCOUNTER — HOSPITAL ENCOUNTER (OUTPATIENT)
Dept: CT IMAGING | Facility: HOSPITAL | Age: 59
Discharge: HOME | End: 2019-07-17
Attending: INTERNAL MEDICINE
Payer: COMMERCIAL

## 2019-07-17 DIAGNOSIS — R22.1 NECK MASS: ICD-10-CM

## 2019-07-17 LAB — CREAT BLD-MCNC: 0.9 MG/DL (ref 0.55–1.02)

## 2019-07-17 PROCEDURE — 82565 ASSAY OF CREATININE: CPT

## 2019-07-17 PROCEDURE — 70491 CT SOFT TISSUE NECK W/DYE: CPT | Performed by: INTERNAL MEDICINE

## 2019-08-15 ENCOUNTER — OFFICE VISIT (OUTPATIENT)
Dept: PHYSICAL THERAPY | Age: 59
End: 2019-08-15
Attending: NURSE PRACTITIONER
Payer: COMMERCIAL

## 2019-08-15 ENCOUNTER — ORDER TRANSCRIPTION (OUTPATIENT)
Dept: PHYSICAL THERAPY | Age: 59
End: 2019-08-15

## 2019-08-15 DIAGNOSIS — M54.16 LUMBAR RADICULOPATHY: ICD-10-CM

## 2019-08-15 DIAGNOSIS — M54.12 RADICULOPATHY OF CERVICAL SPINE: ICD-10-CM

## 2019-08-15 DIAGNOSIS — M54.12 RADICULOPATHY OF CERVICAL SPINE: Primary | ICD-10-CM

## 2019-08-15 PROCEDURE — 97110 THERAPEUTIC EXERCISES: CPT

## 2019-08-15 PROCEDURE — 97162 PT EVAL MOD COMPLEX 30 MIN: CPT

## 2019-08-15 NOTE — PROGRESS NOTES
SPINE EVALUATION:   Referring Physician: Dr. Hardeep Berkowitz  Diagnosis: Cervical pain, Lumbar pain    Date of Service: 8/15/2019     PATIENT SUMMARY   Javan Longo is a 60year old y/o female who presents to therapy today with complaints of cervical and lum poor posture, and SI dysfunction. Functional deficits include but are not limited to sitting, turning head in all directions, sleeping, and standing. Signs and symptoms are consistent with diagnosis of cervical pain and SI dysfunction.  Pt and PT discusse with normal mechanics  Balance: SLS R 4 sec, L 6 sec    Today’s Treatment and Response:   Pt education was provided on exam findings, treatment diagnosis, treatment plan, expectations, and prognosis.  Pt was also provided recommendations for activity modifi improve spinal safety   Pt will have decreased paraspinal mm tension to tolerate standing >1 hour for work and home activities   Pt will demonstrate improved core strength to be able to perform lifting grocery bags with <1/10 pain   Pt will be independent

## 2019-08-19 ENCOUNTER — OFFICE VISIT (OUTPATIENT)
Dept: PHYSICAL THERAPY | Age: 59
End: 2019-08-19
Attending: NURSE PRACTITIONER
Payer: COMMERCIAL

## 2019-08-19 PROCEDURE — 97140 MANUAL THERAPY 1/> REGIONS: CPT

## 2019-08-19 NOTE — PROGRESS NOTES
Dx:  Radiculopathy of cervical spine (M54.12)  Lumbar radiculopathy (M54.16)       Insurance (Authorized # of Visits): Medicaid           Authorizing Physician: Rojelio Fowler NP Next MD visit: none scheduled  Fall Risk: standard         Precautions: n <1/10 pain   12.  Pt will be independent and compliant with comprehensive HEP to maintain progress achieved in PT     Plan: Continue with manual work and postural modifications  Date: 8/19/2019  TX#: 2/10 Date:                 TX#: 3/ Date:

## 2019-08-23 ENCOUNTER — TELEPHONE (OUTPATIENT)
Dept: INTERNAL MEDICINE CLINIC | Facility: CLINIC | Age: 59
End: 2019-08-23

## 2019-08-25 NOTE — TELEPHONE ENCOUNTER
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 Logged for processing.  NK

## 2019-08-27 ENCOUNTER — OFFICE VISIT (OUTPATIENT)
Dept: PAIN CLINIC | Facility: HOSPITAL | Age: 59
End: 2019-08-27
Attending: ANESTHESIOLOGY
Payer: MEDICAID

## 2019-08-27 DIAGNOSIS — M54.10 RADICULOPATHY AFFECTING UPPER EXTREMITY: Primary | ICD-10-CM

## 2019-08-27 PROCEDURE — 99201 HC OUTPT EVAL AND MGNT NEW PT LEVEL 1: CPT

## 2019-08-27 PROCEDURE — 99211 OFF/OP EST MAY X REQ PHY/QHP: CPT

## 2019-08-27 NOTE — CHRONIC PAIN
Initial Consultation Note  Neck pain with bilateral upper extremity radiculopathy right greater than left  HISTORY OF PRESENT ILLNESS:  Higinio Ogden is a 26-year-old female with no risk factors and no history of trauma, developed neck pain approximatel HIVES  Trimethoprim            HIVES  Milk-Related Compou*    PAIN    Comment:Stomach pain, diarrhea  Dust Mite Extract       ITCHING  Penicillins             HIVES, RASH  SURGICAL HISTORY:  Past Surgical History:   Procedure Laterality Date   •  Panic disorder without agoraphobia     Neck mass    Past Medical History:   Diagnosis Date   • Allergic rhinitis    • Anesthesia complication     blood pressure bottomed out - 6-8 hrs after surgery. Revived on own when staff moved pt.    • Anxiety    • Anxi file        Active member of club or organization: Not on file        Attends meetings of clubs or organizations: Not on file        Relationship status: Not on file      Intimate partner violence:        Fear of current or ex partner: Not on file        E 5/5   Brachioradialis 4/5 3/5   Wrist Flexors 4/5 3/5   Wrist Extensors 5/5 5/5   Intrinsic Hand 5/5 5/5    5/5 5/5       PULSES      LEFT RIGHT   Radial 2/4 2/4   Dorsalis Pedis 2/4 2/4   Posterior Tibial 2/4 2/4   Brachial 2/4 2/4   Skin - normal

## 2019-08-27 NOTE — PROGRESS NOTES
Pt presents to [facility] ambulatory for Neck pain referred by Dr. Wendy Parisi. Pt states she has had neck pain for many years. In the past she has done PT which helped with her symptoms. Currently the pt started PT and has done 3 session, she does see a difference.

## 2019-08-28 ENCOUNTER — OFFICE VISIT (OUTPATIENT)
Dept: PHYSICAL THERAPY | Age: 59
End: 2019-08-28
Attending: NURSE PRACTITIONER
Payer: COMMERCIAL

## 2019-08-28 PROCEDURE — 97110 THERAPEUTIC EXERCISES: CPT

## 2019-08-28 PROCEDURE — 97140 MANUAL THERAPY 1/> REGIONS: CPT

## 2019-08-28 NOTE — PROGRESS NOTES
Dx:  Radiculopathy of cervical spine (M54.12)  Lumbar radiculopathy (M54.16)       Insurance (Authorized # of Visits): Medicaid           Authorizing Physician: Zabrina Vsaquez NP Next MD visit: none scheduled  Fall Risk: standard         Precautions: n improved core strength to be able to perform lifting grocery bags with <1/10 pain   12.  Pt will be independent and compliant with comprehensive HEP to maintain progress achieved in PT     Plan: Continue with manual work and postural modifications  Date: 8/

## 2019-08-29 ENCOUNTER — TELEPHONE (OUTPATIENT)
Dept: PAIN CLINIC | Facility: HOSPITAL | Age: 59
End: 2019-08-29

## 2019-08-29 DIAGNOSIS — M54.12 RADICULOPATHY OF CERVICAL SPINE: Primary | ICD-10-CM

## 2019-08-30 ENCOUNTER — OFFICE VISIT (OUTPATIENT)
Dept: PHYSICAL THERAPY | Age: 59
End: 2019-08-30
Attending: NURSE PRACTITIONER
Payer: COMMERCIAL

## 2019-08-30 PROCEDURE — 97140 MANUAL THERAPY 1/> REGIONS: CPT

## 2019-08-30 PROCEDURE — 97110 THERAPEUTIC EXERCISES: CPT

## 2019-08-30 NOTE — PROGRESS NOTES
Dx:  Radiculopathy of cervical spine (M54.12)  Lumbar radiculopathy (M54.16)       Insurance (Authorized # of Visits): Medicaid           Authorizing Physician: Yong Solorzano NP Next MD visit: none scheduled  Fall Risk: standard         Precautions: n lifting grocery bags with <1/10 pain   12.  Pt will be independent and compliant with comprehensive HEP to maintain progress achieved in PT     Plan: Continue with manual work and postural modifications  Date: 8/19/2019  TX#: 2/10 Date:  8/28/19

## 2019-09-03 ENCOUNTER — OFFICE VISIT (OUTPATIENT)
Dept: PHYSICAL THERAPY | Age: 59
End: 2019-09-03
Attending: NURSE PRACTITIONER
Payer: COMMERCIAL

## 2019-09-03 PROCEDURE — 97110 THERAPEUTIC EXERCISES: CPT

## 2019-09-03 PROCEDURE — 97140 MANUAL THERAPY 1/> REGIONS: CPT

## 2019-09-03 NOTE — PROGRESS NOTES
Dx:  Radiculopathy of cervical spine (M54.12)  Lumbar radiculopathy (M54.16)       Insurance (Authorized # of Visits): Medicaid / Radha Robles PPO            Authorizing Physician: Merlinda Craze, NP Next MD visit: TBD  Fall Risk: standard         Precaution will be independent and compliant with comprehensive HEP to maintain progress achieved in PT     Plan: Continue with manual work and postural modifications  Date: 8/19/2019  TX#: 2/10 Date:  8/28/19               TX#: 3/10 Date: 8/30/19                TX#:

## 2019-09-04 ENCOUNTER — OFFICE VISIT (OUTPATIENT)
Dept: PHYSICAL THERAPY | Age: 59
End: 2019-09-04
Attending: NURSE PRACTITIONER
Payer: COMMERCIAL

## 2019-09-04 PROCEDURE — 97140 MANUAL THERAPY 1/> REGIONS: CPT

## 2019-09-04 PROCEDURE — 97110 THERAPEUTIC EXERCISES: CPT

## 2019-09-04 NOTE — PROGRESS NOTES
Dx:  Radiculopathy of cervical spine (M54.12)  Lumbar radiculopathy (M54.16)       Insurance (Authorized # of Visits): Medicaid / Kala Seals PPO            Authorizing Physician: Austen Quintero NP Next MD visit: TBD  Fall Risk: standard         Precaution for work and home activities   6. Pt will demonstrate improved core strength to be able to perform lifting grocery bags with <1/10 pain   12.  Pt will be independent and compliant with comprehensive HEP to maintain progress achieved in PT     Plan: Continu

## 2019-09-09 ENCOUNTER — APPOINTMENT (OUTPATIENT)
Dept: PHYSICAL THERAPY | Age: 59
End: 2019-09-09
Attending: NURSE PRACTITIONER
Payer: COMMERCIAL

## 2019-09-10 ENCOUNTER — PROCEDURE VISIT (OUTPATIENT)
Dept: NEUROLOGY | Facility: CLINIC | Age: 59
End: 2019-09-10
Payer: MEDICAID

## 2019-09-10 DIAGNOSIS — R20.0 NUMBNESS AND TINGLING IN BOTH HANDS: Primary | ICD-10-CM

## 2019-09-10 DIAGNOSIS — R20.2 NUMBNESS AND TINGLING IN BOTH HANDS: Primary | ICD-10-CM

## 2019-09-10 PROCEDURE — 95913 NRV CNDJ TEST 13/> STUDIES: CPT | Performed by: PHYSICAL MEDICINE & REHABILITATION

## 2019-09-10 PROCEDURE — 95886 MUSC TEST DONE W/N TEST COMP: CPT | Performed by: PHYSICAL MEDICINE & REHABILITATION

## 2019-09-10 NOTE — PROCEDURES
[address]   Nerve Conduction Study and Electromyography Procedure Note    Patient: Kamini Cobb Hand Dominance: right  Patient ID: [id number] Referring Dr: Geeta Justin  Sex: Female Test Dr: Harsh Coutio  Date of Birth: [birth year] Wrist Dig II 2.55 3.02 17.7 79.6 Wrist - Dig II 14 55   R Median - Palm      Palm Wrist 1.56 2.08 89.3 105.1 Palm - Wrist 8 51   L Median - Palm      Palm Wrist 1.56 2.08 125.9 127.7 Palm - Wrist 8 51   R Ulnar - Digit  V      Wrist Dig V 2.50 3.23 32. 0 The sensory conduction test was normal in all 10 of the tested nerves: R Median - Digit III, L Median - Digit III, R Median - Palm, L Median - Palm, R Ulnar - Digit  V, L Ulnar - Digit  V, R Ulnar - Palm, L Ulnar - Palm, R Radial - Wrist, L Radial - Wrist.

## 2019-09-11 ENCOUNTER — OFFICE VISIT (OUTPATIENT)
Dept: PHYSICAL THERAPY | Age: 59
End: 2019-09-11
Attending: NURSE PRACTITIONER
Payer: COMMERCIAL

## 2019-09-11 PROCEDURE — 97140 MANUAL THERAPY 1/> REGIONS: CPT

## 2019-09-11 PROCEDURE — 97110 THERAPEUTIC EXERCISES: CPT

## 2019-09-11 NOTE — PROGRESS NOTES
Dx:  Radiculopathy of cervical spine (M54.12)  Lumbar radiculopathy (M54.16)       Insurance (Authorized # of Visits): Medicaid / Quincy Aspen PPO            Authorizing Physician: David Nielsen NP Next MD visit: YAMIL  Fall Risk: standard         Precaution work and home activities   6. Pt will demonstrate improved core strength to be able to perform lifting grocery bags with <1/10 pain   12.  Pt will be independent and compliant with comprehensive HEP to maintain progress achieved in PT     Plan: Continue wi Charges: 1MAN , 2TE    Total Timed Treatment: 40 min  Total Treatment Time: 45 min

## 2019-09-11 NOTE — TELEPHONE ENCOUNTER
Dr. Kandice Douglas,    Please review the Verification of disab form for Anxiety and let me know if I need to change anything. Please sign off on form:  -Highlight the patient and hit \"Chart\" button.   -In Chart Review, w/in the Encounter tab - click 1 time

## 2019-09-12 NOTE — TELEPHONE ENCOUNTER
Verification of Disab has been mailed to pt and   [address] [facility] [employer] of [employer]  [address] [address] [address], [address] [place] [address] Drive    Notified pt via [address] [place] [address] [address].

## 2019-09-13 ENCOUNTER — OFFICE VISIT (OUTPATIENT)
Dept: PHYSICAL THERAPY | Age: 59
End: 2019-09-13
Attending: NURSE PRACTITIONER
Payer: COMMERCIAL

## 2019-09-13 PROCEDURE — 97110 THERAPEUTIC EXERCISES: CPT

## 2019-09-13 PROCEDURE — 97140 MANUAL THERAPY 1/> REGIONS: CPT

## 2019-09-13 NOTE — PROGRESS NOTES
Dx:  Radiculopathy of cervical spine (M54.12)  Lumbar radiculopathy (M54.16)       Insurance (Authorized # of Visits): Medicaid / Harsh Fernandez PPO            Authorizing Physician: Joe King NP Next MD visit: 9/16/19  Fall Risk: standard         Precau and compliant with comprehensive HEP to maintain progress achieved in PT   9. Pt will demonstrate good understanding of proper posture and body mechanics to decrease pain and improve spinal safety   10.  Pt will have decreased paraspinal mm tension to soheila

## 2019-09-16 ENCOUNTER — TELEPHONE (OUTPATIENT)
Dept: NEUROLOGY | Facility: CLINIC | Age: 59
End: 2019-09-16

## 2019-09-16 NOTE — TELEPHONE ENCOUNTER
S/w patient who states she would like to have thumb injection with Dr. Kojo Ji. States her neck is feeling better at this time.      Informed patient she will need to have an office visit with Dr. Kojo Ji since she was only seen for an EMG ordered by another pr

## 2019-09-17 ENCOUNTER — OFFICE VISIT (OUTPATIENT)
Dept: PHYSICAL THERAPY | Age: 59
End: 2019-09-17
Attending: NURSE PRACTITIONER
Payer: COMMERCIAL

## 2019-09-17 PROCEDURE — 97110 THERAPEUTIC EXERCISES: CPT

## 2019-09-17 PROCEDURE — 97140 MANUAL THERAPY 1/> REGIONS: CPT

## 2019-09-17 NOTE — PROGRESS NOTES
Dx:  Radiculopathy of cervical spine (M54.12)  Lumbar radiculopathy (M54.16)       Insurance (Authorized # of Visits): Medicaid / Ramo Flores PPO            Authorizing Physician: Conner Us NP Next MD visit: 9/16/19  Fall Risk: standard         Precau and home activities   11. Pt will demonstrate improved core strength to be able to perform lifting grocery bags with <1/10 pain   12.  Pt will be independent and compliant with comprehensive HEP to maintain progress achieved in PT     Plan: Continue with ma

## 2019-09-19 ENCOUNTER — OFFICE VISIT (OUTPATIENT)
Dept: PHYSICAL THERAPY | Age: 59
End: 2019-09-19
Attending: NURSE PRACTITIONER
Payer: COMMERCIAL

## 2019-09-19 PROCEDURE — 97140 MANUAL THERAPY 1/> REGIONS: CPT

## 2019-09-19 PROCEDURE — 97110 THERAPEUTIC EXERCISES: CPT

## 2019-09-19 NOTE — PROGRESS NOTES
DISCHARGE SUMMARY     Dx:  Radiculopathy of cervical spine (M54.12)  Lumbar radiculopathy (M54.16)       Insurance (Authorized # of Visits): Medicaid / Ramo Flores PPO            Authorizing Physician: Conner Us NP Next MD visit: 9/16/19  Fall Risk: s well as promote upright posturing and decreased pain with sitting for 30 min-MET   5. Pt will report decreased frequency of headaches to <1x/week-MET  6.  Pt will demonstrate improved cervical intrinsic strength to 4+/5 to allow improved cervical stabilizat sidegliges gr 2  Supine TrP /STM release UT /levator scapula  Passive c/s L sidebending 6 x 10 cts  Passive c/s R rotation 10x 5 cts              HEP: c/s extension with towel; scapular stability; supine GTB scapular stability       Charges: Marva Wilkinson

## 2019-10-01 ENCOUNTER — OFFICE VISIT (OUTPATIENT)
Dept: NEUROLOGY | Facility: CLINIC | Age: 59
End: 2019-10-01
Payer: MEDICAID

## 2019-10-01 ENCOUNTER — HOSPITAL ENCOUNTER (OUTPATIENT)
Dept: GENERAL RADIOLOGY | Facility: HOSPITAL | Age: 59
Discharge: HOME | End: 2019-10-01
Attending: PHYSICAL MEDICINE & REHABILITATION
Payer: MEDICAID

## 2019-10-01 VITALS
SYSTOLIC BLOOD PRESSURE: 142 MMHG | DIASTOLIC BLOOD PRESSURE: 82 MMHG | RESPIRATION RATE: 18 BRPM | HEART RATE: 88 BPM | WEIGHT: 173 LBS | BODY MASS INDEX: 26.22 KG/M2 | HEIGHT: 68 IN

## 2019-10-01 DIAGNOSIS — M18.0 ARTHRITIS OF CARPOMETACARPAL (CMC) JOINT OF BOTH THUMBS: Primary | ICD-10-CM

## 2019-10-01 DIAGNOSIS — M54.2 NECK PAIN: ICD-10-CM

## 2019-10-01 DIAGNOSIS — R29.3 POOR POSTURE: ICD-10-CM

## 2019-10-01 DIAGNOSIS — M18.0 ARTHRITIS OF CARPOMETACARPAL (CMC) JOINT OF BOTH THUMBS: ICD-10-CM

## 2019-10-01 PROCEDURE — 73130 X-RAY EXAM OF HAND: CPT | Performed by: PHYSICAL MEDICINE & REHABILITATION

## 2019-10-01 PROCEDURE — 99214 OFFICE O/P EST MOD 30 MIN: CPT | Performed by: PHYSICAL MEDICINE & REHABILITATION

## 2019-10-01 NOTE — PROGRESS NOTES
[address]  NEW PATIENT EVALUATION    Chief Complaint: bilateral hand pain.     HISTORY OF PRESENT ILLNESS:   Patient presents with:  Wrist Pain: New right handed patient c/o bilateral wrist/hand pain (R>L) w medications. She has not had any imaging either. PAST MEDICAL HISTORY:     Past Medical History:   Diagnosis Date   • Allergic rhinitis    • Anesthesia complication     blood pressure bottomed out - 6-8 hrs after surgery.  Revived on own when staff mo Ophthalmic Solution Place 1 drop into both eyes daily as needed. Disp:  Rfl:    Probiotic Product (ALIGN OR) Take by mouth. Disp:  Rfl:    PARoxetine HCl 10 MG Oral Tab Take 10 mg by mouth every morning.    Disp:  Rfl:    Probiotic Product (ALIGN OR) Take hand pain negative for other joint pain/swelling   Neurological: positive for numbness/tingling in the arm and some weakness with  in the hands  Behavioral/Psych: positive for anxiety and negative for depression  Endocrine: negative for diabetic sympto 9.1 09/22/2018    OSMOCALC 289 09/22/2018    ALKPHO 60 09/22/2018    AST 22 09/22/2018    ALT 16 09/22/2018    ALKPHOS 46 06/13/2016    BILT 0.5 09/22/2018    TP 7.1 09/22/2018    ALB 4.4 09/22/2018    GLOBULIN 2.7 09/22/2018    AGRATIO 1.5 06/13/2016    N

## 2019-10-01 NOTE — PATIENT INSTRUCTIONS
-Continue home exercises   -Voltaren gel 3-4 x daily for the next several weeks  -Consider Paraffin baths or ice water baths and soak your hand for 10-15 minutes  -Xray of both hands on the way out today  -Consider getting a thumb spica brace   -If no bett

## 2019-11-20 ENCOUNTER — OFFICE VISIT (OUTPATIENT)
Dept: NEUROLOGY | Facility: CLINIC | Age: 59
End: 2019-11-20
Payer: MEDICAID

## 2019-11-20 ENCOUNTER — TELEPHONE (OUTPATIENT)
Dept: NEUROLOGY | Facility: CLINIC | Age: 59
End: 2019-11-20

## 2019-11-20 VITALS
DIASTOLIC BLOOD PRESSURE: 82 MMHG | WEIGHT: 168 LBS | SYSTOLIC BLOOD PRESSURE: 140 MMHG | HEIGHT: 68 IN | BODY MASS INDEX: 25.46 KG/M2 | HEART RATE: 88 BPM

## 2019-11-20 DIAGNOSIS — R29.3 POOR POSTURE: ICD-10-CM

## 2019-11-20 DIAGNOSIS — M18.0 ARTHRITIS OF CARPOMETACARPAL (CMC) JOINT OF BOTH THUMBS: Primary | ICD-10-CM

## 2019-11-20 DIAGNOSIS — M54.2 NECK PAIN: ICD-10-CM

## 2019-11-20 PROCEDURE — 99214 OFFICE O/P EST MOD 30 MIN: CPT | Performed by: PHYSICAL MEDICINE & REHABILITATION

## 2019-11-20 RX ORDER — LETROZOLE 2.5 MG/1
2.5 TABLET, FILM COATED ORAL DAILY
COMMUNITY
End: 2020-01-22

## 2019-11-20 NOTE — PROGRESS NOTES
[address]  NEW PATIENT EVALUATION    Chief Complaint: bilateral hand pain. HISTORY OF PRESENT ILLNESS:   Patient presents with:  Hand Pain: pt is here for f/u hand pain.  10% relief with med cream.     11 helping. Pain today is more severe, but can be milder at times when not as active. She is endorsing some weakness as a result of this pain as well and feels like she has a hard time with  strength and holding onto objects.   She denies any fevers chil (PCJ=[id number])  2009   • MYOMECTOMY HYSTEROSCOPIC N/A 12/4/2017    Performed by Nayeli rPyor MD at [facility] MAIN OR   • RADIATION Bilateral 04/15/2019    recent radiation tx for breast cancer   • REDUCTION LEFT  05/2014   • REDUCTION OF LARGE BREAST Bilateral Glasses of wine per week      Comment: weekends a few    Drug use: No         REVIEW OF SYSTEMS:   Constitutional: negative for chills, fatigue, fevers and weight loss  Eyes: negative for irritation, redness and visual disturbance  Ears, nose, mouth, throa joint of both thumbs  ROM: intact to all planes of motion  Strength: 5/5  Sensation: Intact to light touch in all dermatomes of the lower extremities  Tinel's test: negative for reproducible symptoms      LABS:   No results found for: EAG, A1C  Lab Results elected to have the procedure done. Advised patient that she will have to follow-up with me 4 weeks after the injection for further evaluation management. In the meantime, she should continue her current treatment medications and home exercises.   Advised

## 2019-11-20 NOTE — TELEPHONE ENCOUNTER
Ultrasound guided bilateral CMC joint injection with corticosteroid CPT Code: 72396,60075, . Called Medicaid to verify eligibility.  Per automated telephone system, patient is eligible for services effective 11-01-19 thru 11-30-19 Will inform davion

## 2019-11-20 NOTE — TELEPHONE ENCOUNTER
BRYAN to schedule her for ultrasound guided bilateral CMC joint injection with corticosteroid with Dr. Cherylene League.

## 2019-11-25 ENCOUNTER — OFFICE VISIT (OUTPATIENT)
Dept: NEUROLOGY | Facility: CLINIC | Age: 59
End: 2019-11-25
Payer: MEDICAID

## 2019-11-25 VITALS
HEIGHT: 68 IN | DIASTOLIC BLOOD PRESSURE: 84 MMHG | WEIGHT: 168 LBS | SYSTOLIC BLOOD PRESSURE: 128 MMHG | HEART RATE: 92 BPM | RESPIRATION RATE: 18 BRPM | BODY MASS INDEX: 25.46 KG/M2

## 2019-11-25 DIAGNOSIS — M18.0 ARTHRITIS OF CARPOMETACARPAL (CMC) JOINT OF BOTH THUMBS: Primary | ICD-10-CM

## 2019-11-25 PROCEDURE — 20604 DRAIN/INJ JOINT/BURSA W/US: CPT | Performed by: PHYSICAL MEDICINE & REHABILITATION

## 2019-11-25 RX ORDER — VENLAFAXINE HYDROCHLORIDE 75 MG/1
75 CAPSULE, EXTENDED RELEASE ORAL DAILY
COMMUNITY
End: 2020-09-15

## 2019-11-25 RX ORDER — LIDOCAINE HYDROCHLORIDE 10 MG/ML
0.5 INJECTION, SOLUTION INFILTRATION; PERINEURAL ONCE
Status: COMPLETED | OUTPATIENT
Start: 2019-11-25 | End: 2019-11-25

## 2019-11-25 RX ORDER — TRIAMCINOLONE ACETONIDE 40 MG/ML
20 INJECTION, SUSPENSION INTRA-ARTICULAR; INTRAMUSCULAR ONCE
Status: COMPLETED | OUTPATIENT
Start: 2019-11-25 | End: 2019-11-25

## 2019-11-25 RX ORDER — TRIAMCINOLONE ACETONIDE 40 MG/ML
40 INJECTION, SUSPENSION INTRA-ARTICULAR; INTRAMUSCULAR ONCE
Status: COMPLETED | OUTPATIENT
Start: 2019-11-25 | End: 2019-11-25

## 2019-11-25 RX ORDER — LIDOCAINE HYDROCHLORIDE 10 MG/ML
1 INJECTION, SOLUTION INFILTRATION; PERINEURAL ONCE
Status: COMPLETED | OUTPATIENT
Start: 2019-11-25 | End: 2019-11-25

## 2019-11-25 NOTE — PROCEDURES
Procedure:  Ultrasound guided bilateral CMC joint aspiration and injection  The risks, benefits and anticipated outcomes of the procedure, the risks and benefits of the alternatives to the procedure, and the roles and tasks of the personnel to be involved,

## 2019-11-26 ENCOUNTER — MED REC SCAN ONLY (OUTPATIENT)
Dept: NEUROLOGY | Facility: CLINIC | Age: 59
End: 2019-11-26

## 2019-12-30 ENCOUNTER — TELEPHONE (OUTPATIENT)
Dept: ALLERGY | Facility: CLINIC | Age: 59
End: 2019-12-30

## 2019-12-30 ENCOUNTER — OFFICE VISIT (OUTPATIENT)
Dept: ALLERGY | Facility: CLINIC | Age: 59
End: 2019-12-30
Payer: MEDICAID

## 2019-12-30 ENCOUNTER — LAB ENCOUNTER (OUTPATIENT)
Dept: LAB | Age: 59
End: 2019-12-30
Attending: ALLERGY & IMMUNOLOGY
Payer: MEDICAID

## 2019-12-30 VITALS
HEART RATE: 99 BPM | OXYGEN SATURATION: 99 % | RESPIRATION RATE: 18 BRPM | SYSTOLIC BLOOD PRESSURE: 154 MMHG | DIASTOLIC BLOOD PRESSURE: 88 MMHG | TEMPERATURE: 98 F

## 2019-12-30 DIAGNOSIS — L29.9 PRURITUS: ICD-10-CM

## 2019-12-30 DIAGNOSIS — L29.9 PRURITUS: Primary | ICD-10-CM

## 2019-12-30 LAB
ALBUMIN SERPL-MCNC: 4 G/DL (ref 3.4–5)
ALBUMIN/GLOB SERPL: 1.1 {RATIO} (ref 1–2)
ALP LIVER SERPL-CCNC: 75 U/L (ref 46–118)
ALT SERPL-CCNC: 36 U/L (ref 13–56)
ANION GAP SERPL CALC-SCNC: 4 MMOL/L (ref 0–18)
AST SERPL-CCNC: 19 U/L (ref 15–37)
BASOPHILS # BLD AUTO: 0.04 X10(3) UL (ref 0–0.2)
BASOPHILS NFR BLD AUTO: 0.6 %
BILIRUB SERPL-MCNC: 0.5 MG/DL (ref 0.1–2)
BUN BLD-MCNC: 15 MG/DL (ref 7–18)
BUN/CREAT SERPL: 12.7 (ref 10–20)
CALCIUM BLD-MCNC: 9 MG/DL (ref 8.5–10.1)
CHLORIDE SERPL-SCNC: 107 MMOL/L (ref 98–112)
CO2 SERPL-SCNC: 30 MMOL/L (ref 21–32)
CREAT BLD-MCNC: 1.18 MG/DL (ref 0.55–1.02)
DEPRECATED RDW RBC AUTO: 45.2 FL (ref 35.1–46.3)
EOSINOPHIL # BLD AUTO: 0.09 X10(3) UL (ref 0–0.7)
EOSINOPHIL NFR BLD AUTO: 1.4 %
ERYTHROCYTE [DISTWIDTH] IN BLOOD BY AUTOMATED COUNT: 12.7 % (ref 11–15)
GLOBULIN PLAS-MCNC: 3.6 G/DL (ref 2.8–4.4)
GLUCOSE BLD-MCNC: 118 MG/DL (ref 70–99)
HCT VFR BLD AUTO: 44.4 % (ref 35–48)
HGB BLD-MCNC: 14.4 G/DL (ref 12–16)
IMM GRANULOCYTES # BLD AUTO: 0.02 X10(3) UL (ref 0–1)
IMM GRANULOCYTES NFR BLD: 0.3 %
LYMPHOCYTES # BLD AUTO: 1.4 X10(3) UL (ref 1–4)
LYMPHOCYTES NFR BLD AUTO: 21.7 %
M PROTEIN MFR SERPL ELPH: 7.6 G/DL (ref 6.4–8.2)
MCH RBC QN AUTO: 31.1 PG (ref 26–34)
MCHC RBC AUTO-ENTMCNC: 32.4 G/DL (ref 31–37)
MCV RBC AUTO: 95.9 FL (ref 80–100)
MONOCYTES # BLD AUTO: 0.55 X10(3) UL (ref 0.1–1)
MONOCYTES NFR BLD AUTO: 8.5 %
NEUTROPHILS # BLD AUTO: 4.35 X10 (3) UL (ref 1.5–7.7)
NEUTROPHILS # BLD AUTO: 4.35 X10(3) UL (ref 1.5–7.7)
NEUTROPHILS NFR BLD AUTO: 67.5 %
OSMOLALITY SERPL CALC.SUM OF ELEC: 294 MOSM/KG (ref 275–295)
PATIENT FASTING Y/N/NP: NO
PLATELET # BLD AUTO: 245 10(3)UL (ref 150–450)
POTASSIUM SERPL-SCNC: 4.2 MMOL/L (ref 3.5–5.1)
RBC # BLD AUTO: 4.63 X10(6)UL (ref 3.8–5.3)
SODIUM SERPL-SCNC: 141 MMOL/L (ref 136–145)
TSI SER-ACNC: 1.42 MIU/ML (ref 0.36–3.74)
WBC # BLD AUTO: 6.5 X10(3) UL (ref 4–11)

## 2019-12-30 PROCEDURE — 85025 COMPLETE CBC W/AUTO DIFF WBC: CPT

## 2019-12-30 PROCEDURE — 80053 COMPREHEN METABOLIC PANEL: CPT

## 2019-12-30 PROCEDURE — 36415 COLL VENOUS BLD VENIPUNCTURE: CPT

## 2019-12-30 PROCEDURE — 99204 OFFICE O/P NEW MOD 45 MIN: CPT | Performed by: ALLERGY & IMMUNOLOGY

## 2019-12-30 PROCEDURE — 84443 ASSAY THYROID STIM HORMONE: CPT

## 2019-12-30 RX ORDER — FLUCONAZOLE 200 MG/1
200 TABLET ORAL DAILY
Qty: 7 TABLET | Refills: 0 | Status: SHIPPED | OUTPATIENT
Start: 2019-12-30 | End: 2020-01-06

## 2019-12-30 RX ORDER — EXEMESTANE 25 MG/1
25 TABLET ORAL DAILY
COMMUNITY
End: 2020-11-05

## 2019-12-30 RX ORDER — HYDROXYZINE HYDROCHLORIDE 25 MG/1
25 TABLET, FILM COATED ORAL NIGHTLY
Qty: 30 TABLET | Refills: 0 | Status: SHIPPED | OUTPATIENT
Start: 2019-12-30 | End: 2020-07-09

## 2019-12-30 RX ORDER — LEVOCETIRIZINE DIHYDROCHLORIDE 5 MG/1
5 TABLET, FILM COATED ORAL EVERY 12 HOURS PRN
Qty: 60 TABLET | Refills: 0 | Status: SHIPPED | OUTPATIENT
Start: 2019-12-30 | End: 2020-01-27

## 2019-12-30 NOTE — TELEPHONE ENCOUNTER
Pharmacist of [facility] is calling regarding patient's medications, Levocetirizine Dihydrochloride (XYZAL) 5 MG Oral Tab and hydrOXYzine HCl 25 MG Oral Tab. Pharmacist is requesting confirmation to dispense since medications are both antihistamines.

## 2019-12-30 NOTE — TELEPHONE ENCOUNTER
Dr. Bonnie Rahman, please see message listed below. \"Pharmacist of [facility] Veda Leal is calling regarding patient's medications, Levocetirizine Dihydrochloride (XYZAL) 5 MG Oral Tab and hydrOXYzine HCl 25 MG Oral Tab.  Pharmacist is requesting confirmation to dispe

## 2019-12-30 NOTE — PATIENT INSTRUCTIONS
Recs: Check CBC TSH CMP  Trial of triamcinolone 0.1% ointment twice a day to involved areas  Trial of Xyzal, levocetirizine once a day as a nonsedating antihistamine.   May titrate up to twice a day if needed  Add hydroxyzine 25 mg once night at bedtime as

## 2019-12-30 NOTE — TELEPHONE ENCOUNTER
Please dispense both medications.   Patient is to start taking Xyzal once a day and Atarax 25 mg once a night at bedtime due to persistent pruritus/itching

## 2019-12-30 NOTE — PROGRESS NOTES
Katie Lipoma is a 60year old female. HPI:   Patient presents with: Allergic Rxn Allergies: Patient reporting severe itching possibly related to cancer medicaiton.        Patient is a 71-year-old female who presents for allergy evaluation    Revie 04/15/2019    recent radiation tx for breast cancer   • REDUCTION LEFT  05/2014   • REDUCTION OF LARGE BREAST Bilateral 2014   • REDUCTION RIGHT  05/2014      Family History   Adopted: Yes   Family history unknown: Yes      Social History: Social History Fexofenadine HCl (MUCINEX ALLERGY OR) Take by mouth. • PATANOL 0.1 % Ophthalmic Solution Place 1 drop into both eyes daily as needed. • letrozole 2.5 MG Oral Tab Take 2.5 mg by mouth daily.          Allergies:    Banana                  HIVES  Can rhythm no murmurs, gallups, or rubs  Abdomen: soft non-tender non-distended  Skin/Hair: no unusual rashes present.   Negative dermatographia screen  Extremities: no edema, cyanosis, or clubbing  Neurological:Oriented to time, place, person & situation in regards to medication administration and dosing and potential side effects.  Teaching was provided via the teach back method

## 2019-12-31 ENCOUNTER — TELEPHONE (OUTPATIENT)
Dept: ALLERGY | Facility: CLINIC | Age: 59
End: 2019-12-31

## 2019-12-31 NOTE — TELEPHONE ENCOUNTER
Called and spoke with patient, reviewed Dr. Deven Waldron message as below. Questions answered. No further needs at this time.      Notes recorded by Rony Betancur MD on 12/30/2019 at 5:35 PM CST  Please call patient with normal TSH of 1.42.  Her CMP showed

## 2020-01-19 ENCOUNTER — HOSPITAL ENCOUNTER (OUTPATIENT)
Age: 60
Discharge: HOME | End: 2020-01-19
Attending: EMERGENCY MEDICINE
Payer: MEDICAID

## 2020-01-19 ENCOUNTER — APPOINTMENT (OUTPATIENT)
Dept: GENERAL RADIOLOGY | Age: 60
End: 2020-01-19
Attending: EMERGENCY MEDICINE
Payer: MEDICAID

## 2020-01-19 VITALS
TEMPERATURE: 99 F | HEART RATE: 138 BPM | DIASTOLIC BLOOD PRESSURE: 84 MMHG | RESPIRATION RATE: 18 BRPM | SYSTOLIC BLOOD PRESSURE: 135 MMHG | OXYGEN SATURATION: 99 % | HEIGHT: 68 IN | WEIGHT: 168 LBS | BODY MASS INDEX: 25.46 KG/M2

## 2020-01-19 DIAGNOSIS — S62.662A NONDISPLACED FRACTURE OF DISTAL PHALANX OF RIGHT MIDDLE FINGER, INITIAL ENCOUNTER FOR CLOSED FRACTURE: Primary | ICD-10-CM

## 2020-01-19 PROCEDURE — 99213 OFFICE O/P EST LOW 20 MIN: CPT

## 2020-01-19 PROCEDURE — 90471 IMMUNIZATION ADMIN: CPT

## 2020-01-19 PROCEDURE — 26720 TREAT FINGER FRACTURE EACH: CPT

## 2020-01-19 PROCEDURE — 73140 X-RAY EXAM OF FINGER(S): CPT | Performed by: EMERGENCY MEDICINE

## 2020-01-19 NOTE — ED PROVIDER NOTES
Patient Seen in: [facility] In [address] [place] [address]      History   Patient presents with:  Finger Injury  Cold    Stated Complaint: laceration, cold sx    HPI    Patient is 79-year-old female that closed her right middle finger in a door last n Cigarettes        Quit date: 1988        Years since quittin.0      Smokeless tobacco: Never Used      Tobacco comment: socially    Alcohol use:  Yes      Alcohol/week: 0.8 standard drinks      Types: 1 Glasses of wine per week      Comment: Tai Brown person, place, and time. Normal reflexes. No cranial nerve deficit. No motor os sensory defecits noted Coordination normal.   Skin: Skin is warm and dry. Psychiatric: Normal mood and affect.  Behavior is normal. Judgment and thought content normal.   Nurs

## 2020-01-19 NOTE — ED INITIAL ASSESSMENT (HPI)
Pt was at a wedding yesterday and had her right 3rd finger slammed with the door. +fever, body aches, and congestion since yesterday.

## 2020-01-22 ENCOUNTER — OFFICE VISIT (OUTPATIENT)
Dept: FAMILY MEDICINE CLINIC | Facility: CLINIC | Age: 60
End: 2020-01-22
Payer: MEDICAID

## 2020-01-22 ENCOUNTER — TELEPHONE (OUTPATIENT)
Dept: OTHER | Age: 60
End: 2020-01-22

## 2020-01-22 VITALS
DIASTOLIC BLOOD PRESSURE: 84 MMHG | WEIGHT: 168 LBS | OXYGEN SATURATION: 97 % | TEMPERATURE: 98 F | SYSTOLIC BLOOD PRESSURE: 120 MMHG | HEIGHT: 68 IN | HEART RATE: 88 BPM | BODY MASS INDEX: 25.46 KG/M2

## 2020-01-22 DIAGNOSIS — J01.00 ACUTE NON-RECURRENT MAXILLARY SINUSITIS: Primary | ICD-10-CM

## 2020-01-22 DIAGNOSIS — S69.91XD FINGER INJURY, RIGHT, SUBSEQUENT ENCOUNTER: ICD-10-CM

## 2020-01-22 PROCEDURE — 99213 OFFICE O/P EST LOW 20 MIN: CPT | Performed by: PHYSICIAN ASSISTANT

## 2020-01-22 RX ORDER — ALPRAZOLAM 0.5 MG/1
TABLET ORAL
COMMUNITY
Start: 2019-12-16 | End: 2020-01-22

## 2020-01-22 RX ORDER — AMOXICILLIN AND CLAVULANATE POTASSIUM 875; 125 MG/1; MG/1
1 TABLET, FILM COATED ORAL 2 TIMES DAILY
Qty: 20 TABLET | Refills: 0 | Status: SHIPPED | OUTPATIENT
Start: 2020-01-22 | End: 2020-02-06

## 2020-01-22 NOTE — PROGRESS NOTES
HPI:    Patient ID: Radha Krishnamurthy is a 60year old female. Pt presents with cold symptoms for the past 5 days. Pt has had cough (green mucous production), sore throat. Has fevers. Has congestion. Pt has tried otc remedies without relief.  Pt states • hydrOXYzine HCl 25 MG Oral Tab Take 1 tablet (25 mg total) by mouth nightly.  30 tablet 0   • triamcinolone acetonide 0.1 % External Ointment Apply 2x/day to involved areas as needed 453 g 0   • Venlafaxine HCl ER 75 MG Oral Capsule SR 24 Hr Take 75 mg by Nose: Right sinus exhibits maxillary sinus tenderness. Left sinus exhibits maxillary sinus tenderness. Mouth/Throat: Oropharynx is clear and moist. No oropharyngeal exudate or posterior oropharyngeal erythema.    Clear rhinorrhea noted     Eyes: Conjuncti -Told to see Plastic surgery for hand    -Educated pt on how to do wound dressings   -Placed on Augmentin for infection prevention as she did jamb it against a bathroom door. -Advise to elevate and ice finger as much as possible to decrease swelling.    -

## 2020-01-22 NOTE — PATIENT INSTRUCTIONS
Augmentin   1 pill for in the morning and 1 pill at night for 10 days.     Take probiotics with the antibiotics in order to prevent diarrhea     Keep hydrated   Steam from the shower     If you have hives, shortness of breath or rash please stop and let me

## 2020-01-23 ENCOUNTER — OFFICE VISIT (OUTPATIENT)
Dept: SURGERY | Facility: CLINIC | Age: 60
End: 2020-01-23
Payer: MEDICAID

## 2020-01-23 DIAGNOSIS — S62.662A CLOSED NONDISPLACED FRACTURE OF DISTAL PHALANX OF RIGHT MIDDLE FINGER, INITIAL ENCOUNTER: Primary | ICD-10-CM

## 2020-01-23 PROCEDURE — 99243 OFF/OP CNSLTJ NEW/EST LOW 30: CPT | Performed by: PLASTIC SURGERY

## 2020-01-23 NOTE — H&P
Alexandra Gaona is a 60year old female that presents with Patient presents with: Injury: RIght middle finger  .     REFERRED BY:   Tana Rosa MD    Pacemaker: No  Latex Allergy: no  Coumadin: No  Plavix: No  Other anticoagulants: No  Cardiac s Dispense Refill   • Amoxicillin-Pot Clavulanate (AUGMENTIN) 875-125 MG Oral Tab Take 1 tablet by mouth 2 (two) times daily. 20 tablet 0   • exemestane 25 MG Oral Tab Take 25 mg by mouth daily.      • Levocetirizine Dihydrochloride (XYZAL) 5 MG Oral Tab Take 04-26-11    \"Neuroma 3 rt\"   • Metatarsalgia 04-    \"modified insole / rx naprosyn\"   • PONV (postoperative nausea and vomiting)    • Right knee injury 1989    Arthroscopy   • Visual impairment     glasses/contacts,dry eye     Past Surgical Hist file    Relationships      Social connections:        Talks on phone: Not on file        Gets together: Not on file        Attends Synagogue service: Not on file        Active member of club or organization: Not on file        Attends meetings of clubs or Normocephalic  EYES: Conjunctiva - Right: Normal, Left: Normal; EOMI  EARS: Inspection - Right: Normal, Left: Normal  NECK/THYROID: Inspection - Normal, Palpation - Normal, Thyroid gland - Normal, No adenopathy  RESPIRATORY: Inspection - Normal, Effort - N

## 2020-01-27 RX ORDER — LEVOCETIRIZINE DIHYDROCHLORIDE 5 MG/1
TABLET, FILM COATED ORAL
Qty: 60 TABLET | Refills: 0 | Status: SHIPPED | OUTPATIENT
Start: 2020-01-27 | End: 2020-07-09

## 2020-01-27 NOTE — TELEPHONE ENCOUNTER
Pt last seen in Allergy 12/20/2019 for . . .    Pruritus  (primary encounter diagnosis)    Refill request received for .  . .    Levocetirizine Dihydrochloride (XYZAL) 5 MG Oral Tab 60 tablet 0 12/30/2019    Sig:   Take 1 tablet (5 mg total) by mouth every

## 2020-01-27 NOTE — TELEPHONE ENCOUNTER
RN called patient to notify her of RX refill and need for follow up appointment. Patient verbalizes understanding and reports she will schedule via [employer] when she gets home.

## 2020-02-06 ENCOUNTER — OFFICE VISIT (OUTPATIENT)
Dept: SURGERY | Facility: CLINIC | Age: 60
End: 2020-02-06
Payer: MEDICAID

## 2020-02-06 DIAGNOSIS — S62.662A CLOSED NONDISPLACED FRACTURE OF DISTAL PHALANX OF RIGHT MIDDLE FINGER, INITIAL ENCOUNTER: Primary | ICD-10-CM

## 2020-02-06 DIAGNOSIS — M62.81 DISTAL MUSCLE WEAKNESS: ICD-10-CM

## 2020-02-06 DIAGNOSIS — M25.641 JOINT STIFFNESS OF HAND, RIGHT: Primary | ICD-10-CM

## 2020-02-06 PROCEDURE — 99212 OFFICE O/P EST SF 10 MIN: CPT | Performed by: PLASTIC SURGERY

## 2020-02-06 PROCEDURE — 97166 OT EVAL MOD COMPLEX 45 MIN: CPT | Performed by: OCCUPATIONAL THERAPIST

## 2020-02-06 PROCEDURE — 97110 THERAPEUTIC EXERCISES: CPT | Performed by: OCCUPATIONAL THERAPIST

## 2020-02-06 PROCEDURE — 29130 APPL FINGER SPLINT STATIC: CPT | Performed by: OCCUPATIONAL THERAPIST

## 2020-02-06 NOTE — PROGRESS NOTES
OCCUPATIONAL THERAPY EVALUATION:   Radha Yessy   [id number]        SUBJECTIVE:    HX of Injury: RMF crush injury  Chief Complaint:   RMF stiffness with AROM.     Precautions: Minimal resistance against the RMF, Splint to be worn between exercises, x AROM  X 15 - 20 degrees. Long term goals to be reached in x 1 month. Patient will be seen 2 x /week for 2 weeks or a total of 4 visits. Pt. was advised regarding the findings of this evaluation and agrees to the plan of care.      [facility]

## 2020-02-06 NOTE — PROGRESS NOTES
Injury 1: RMF crush with DP comminuted fx, nondisplaced, seen in IC one day later  - Date: 01/18/20  - Days Since: 19  \"Doing better\"  Intermittent throbbing Right middle finger. Rates pain 5/10. Not taking analgesics. Denies numbness and tingling.   Deniz Rowley

## 2020-02-10 ENCOUNTER — OFFICE VISIT (OUTPATIENT)
Dept: PODIATRY CLINIC | Facility: CLINIC | Age: 60
End: 2020-02-10
Payer: MEDICAID

## 2020-02-10 DIAGNOSIS — M77.9 TENDONITIS: Primary | ICD-10-CM

## 2020-02-10 PROCEDURE — 99213 OFFICE O/P EST LOW 20 MIN: CPT | Performed by: PODIATRIST

## 2020-02-10 NOTE — PROGRESS NOTES
HPI:    Patient ID: Timmy Shone is a 60year old female. This 48-year-old female presents to the office today having not been seen in well over 3 years. Patient's chief complaint today is pain associated with the right foot.   The pain is been pr Ophthalmic Solution Place 1 drop into both eyes daily as needed.          Allergies:  Banana                  HIVES  Cantaloupe              HIVES  Cephalosporins          HIVES  Sulfamethoxazole        HIVES  Trimethoprim            HIVES  Milk-Related Com

## 2020-02-17 ENCOUNTER — OFFICE VISIT (OUTPATIENT)
Dept: SURGERY | Facility: CLINIC | Age: 60
End: 2020-02-17
Payer: MEDICAID

## 2020-02-17 DIAGNOSIS — M62.81 DISTAL MUSCLE WEAKNESS: ICD-10-CM

## 2020-02-17 DIAGNOSIS — M25.641 JOINT STIFFNESS OF HAND, RIGHT: Primary | ICD-10-CM

## 2020-02-17 PROCEDURE — 97110 THERAPEUTIC EXERCISES: CPT | Performed by: OCCUPATIONAL THERAPIST

## 2020-02-17 NOTE — PROGRESS NOTES
Subjective: The scab came off, I am doing well.       Objective:     Current level of performance:  ADL: Independent  Work: N/A  Leisure: Not addressed    Measurements/Tests:  ROM:  Testing By: rei   Strength Right: 50 #      Strength Left: 60 #

## 2020-04-01 RX ORDER — FLUCONAZOLE 200 MG/1
TABLET ORAL
Qty: 7 TABLET | Refills: 0 | OUTPATIENT
Start: 2020-04-01

## 2020-04-01 NOTE — TELEPHONE ENCOUNTER
Refill requested for:   Name from pharmacy: Fluconazole 200 Mg Tab Nort         Will file in chart as: FLUCONAZOLE 200 MG Oral Tab         Sig: TAKE ONE TABLET BY MOUTH ONE TIME DAILY FOR 7 DAYS     Disp:  7 tablet    Refills:  0    Start: 4/1/2020    Kj

## 2020-04-01 NOTE — TELEPHONE ENCOUNTER
RN called patient to notify her of RX request denial.  Patient states she is on cancer medicine and she breaks out in hot flashes frequently and developed fungal infections as a result.     RN advised that she contact her PCP or Oncologist as they may have

## 2020-04-01 NOTE — TELEPHONE ENCOUNTER
Refill request denied as patient is requesting an antifungal which is not an allergy medication.   Patient may wish to touch base with her PCP if she feels she needs an antifungal

## 2020-04-02 ENCOUNTER — TELEPHONE (OUTPATIENT)
Dept: INTERNAL MEDICINE CLINIC | Facility: CLINIC | Age: 60
End: 2020-04-02

## 2020-04-02 ENCOUNTER — PATIENT MESSAGE (OUTPATIENT)
Dept: INTERNAL MEDICINE CLINIC | Facility: CLINIC | Age: 60
End: 2020-04-02

## 2020-04-02 RX ORDER — FLUCONAZOLE 150 MG/1
150 TABLET ORAL ONCE
Qty: 1 TABLET | Refills: 0 | Status: SHIPPED | OUTPATIENT
Start: 2020-04-02 | End: 2020-04-02

## 2020-04-02 NOTE — TELEPHONE ENCOUNTER
Onset 6 months, intermittent, pt has treated this twice already, this is the third episode    Onset 1 week ago, pt tried OTC monistat and a fungal cream but pt stts diflucan works the best, and pt stts if there is something she can take everyday as an anti

## 2020-04-02 NOTE — TELEPHONE ENCOUNTER
She needs to see her  gynecologist for her recurrent yeast vaginitis. I can give her the diflucan 150mg x1 dose for now. Erx sent.

## 2020-04-02 NOTE — TELEPHONE ENCOUNTER
RN=call and triage. Minubot message sent to the original message. ----- Message from [address] [facility].  Kye Easton sent at 4/2/2020 10:36 AM CDT -----  Regarding: Prescription Question  Contact: [phone]  Ruslan Anthony,  II am going to group couns

## 2020-04-02 NOTE — TELEPHONE ENCOUNTER
Advised patient of Dr. Varsha Urrutia   note. Patient verbalized understanding and had no further questions.

## 2020-04-02 NOTE — TELEPHONE ENCOUNTER
.See acute telephone encounter 4/2//20. From: Flakito Adams  To:  Audrey Jones MD  Sent: 4/2/2020 10:36 AM CDT  Subject: Angelina Witt,  II am going to group counseling and individual counseling (until COVI

## 2020-04-24 ENCOUNTER — TELEPHONE (OUTPATIENT)
Dept: OBGYN CLINIC | Facility: CLINIC | Age: 60
End: 2020-04-24

## 2020-04-24 NOTE — TELEPHONE ENCOUNTER
C/O VAG YEAST INFECTION, WHITE DISCHARGE AND ITCHING.  ALSO HAVING A LOT OF HOT FLASHES THAT ARE DRIVING HER CRAZY. PT HAD STAGE 1 AND STAGE 2 BREAST CANCER BILATERALLY ABOUT 1 YEAR AGO.   IS ON EXEMESTAME FOR TREATMENT AND HOT FLASHES ARE SIDE EFFECT OF M

## 2020-04-24 NOTE — TELEPHONE ENCOUNTER
Please have her try monistat 3 OTC which is what I prefer because it comes into direct contact with the vaginal tissues. If she prefers then you can send erx for diflucan 150mg po x1.   If she would like to make an appt to discuss the hot flashes then she

## 2020-05-06 RX ORDER — FLUCONAZOLE 150 MG/1
TABLET ORAL
Qty: 1 TABLET | Refills: 0 | Status: SHIPPED | OUTPATIENT
Start: 2020-05-06 | End: 2021-09-15

## 2020-05-06 NOTE — TELEPHONE ENCOUNTER
Informed pt of CAP's recs. Pt states that she would prefer Diflucan and would a rx sent. Rx sent to pt's pharmacy. Pt states she has an appt in July 2020 with CAP and she will discuss her hot flashes at that appt.

## 2020-06-19 ENCOUNTER — TELEPHONE (OUTPATIENT)
Dept: PULMONOLOGY | Facility: CLINIC | Age: 60
End: 2020-06-19

## 2020-06-19 NOTE — TELEPHONE ENCOUNTER
Spoke with pt who states she will be needing an appt with  for snoring. Pt was informed of appt time and date with  on July 17 at 9:30. Pt informed of [facility] location. Pt voiced understanding.

## 2020-06-19 NOTE — TELEPHONE ENCOUNTER
Patient called in to schedule an consult appointment with Dr. Tomas Leahy or Dr. Nicholas Penaloza.  Please advise

## 2020-06-23 ENCOUNTER — TELEPHONE (OUTPATIENT)
Dept: INTERNAL MEDICINE CLINIC | Facility: CLINIC | Age: 60
End: 2020-06-23

## 2020-06-23 DIAGNOSIS — Z20.822 EXPOSURE TO COVID-19 VIRUS: Primary | ICD-10-CM

## 2020-06-23 NOTE — TELEPHONE ENCOUNTER
Pt calling to request antibody testing for Covid 19. Pt states she was sick in January with upper resp symptoms.     Pt states \"I am asking because if I was exposed maybe I can help someone by donating plasma\"    Please advise

## 2020-06-24 ENCOUNTER — APPOINTMENT (OUTPATIENT)
Dept: LAB | Age: 60
End: 2020-06-24
Attending: INTERNAL MEDICINE
Payer: MEDICAID

## 2020-06-24 DIAGNOSIS — Z20.822 EXPOSURE TO COVID-19 VIRUS: ICD-10-CM

## 2020-06-24 PROCEDURE — 36415 COLL VENOUS BLD VENIPUNCTURE: CPT

## 2020-06-24 PROCEDURE — 86769 SARS-COV-2 COVID-19 ANTIBODY: CPT

## 2020-07-02 ENCOUNTER — OFFICE VISIT (OUTPATIENT)
Dept: OBGYN CLINIC | Facility: CLINIC | Age: 60
End: 2020-07-02
Payer: MEDICAID

## 2020-07-02 VITALS
HEART RATE: 74 BPM | SYSTOLIC BLOOD PRESSURE: 134 MMHG | WEIGHT: 180.19 LBS | BODY MASS INDEX: 27 KG/M2 | DIASTOLIC BLOOD PRESSURE: 84 MMHG

## 2020-07-02 DIAGNOSIS — L29.2 VULVAR ITCHING: ICD-10-CM

## 2020-07-02 DIAGNOSIS — Z01.419 ENCOUNTER FOR GYNECOLOGICAL EXAMINATION WITHOUT ABNORMAL FINDING: Primary | ICD-10-CM

## 2020-07-02 PROCEDURE — G0101 CA SCREEN;PELVIC/BREAST EXAM: HCPCS | Performed by: OBSTETRICS & GYNECOLOGY

## 2020-07-02 RX ORDER — ALPRAZOLAM 0.5 MG/1
0.5 TABLET ORAL 2 TIMES DAILY PRN
COMMUNITY
Start: 2020-06-26 | End: 2020-09-15

## 2020-07-02 NOTE — PROGRESS NOTES
Ailyn Adams is a 60year old female [id number] No LMP recorded. (Menstrual status: Menopause). who presents for Patient presents with:  Gyn Exam: Annual exam    She c/o increasing sweating in the vulvar area that leads to itching.   Pt has used creams a file      Highest education level: Not on file    Occupational History      Not on file    Social Needs      Financial resource strain: Not on file      Food insecurity:        Worry: Not on file        Inability: Not on file      Transportation needs: Not Asked        Self-Exams: Not Asked        Grew up on a farm: Not Asked        History of tanning: No        Outdoor occupation: Not Asked        Pt has a pacemaker: No        Pt has a defibrillator: Not Asked        Breast feeding: Not Asked        Radha Torrez OR), Take by mouth., Disp: , Rfl:   •  PATANOL 0.1 % Ophthalmic Solution, Place 1 drop into both eyes daily as needed.   , Disp: , Rfl:   •  LEVOCETIRIZINE DIHYDROCHLORIDE 5 MG Oral Tab, TAKE ONE TABLET BY MOUTH EVERY TWELVE HOURS AS NEEDED FOR ALLERGIES (P supraclavicular or axillary adenopathy is noted  Breast: normal without palpable masses, tenderness, asymmetry, nipple discharge, nipple retraction or skin changes  Respiratory:  lungs clear to auscultation bilaterally  Cardiovascular: regular rate and rhy

## 2020-07-09 ENCOUNTER — TELEPHONE (OUTPATIENT)
Dept: NEUROLOGY | Facility: CLINIC | Age: 60
End: 2020-07-09

## 2020-07-09 ENCOUNTER — OFFICE VISIT (OUTPATIENT)
Dept: NEUROLOGY | Facility: CLINIC | Age: 60
End: 2020-07-09
Payer: MEDICAID

## 2020-07-09 VITALS
HEART RATE: 76 BPM | DIASTOLIC BLOOD PRESSURE: 76 MMHG | SYSTOLIC BLOOD PRESSURE: 124 MMHG | WEIGHT: 170 LBS | BODY MASS INDEX: 25.76 KG/M2 | HEIGHT: 68 IN

## 2020-07-09 DIAGNOSIS — Z17.0 MALIGNANT NEOPLASM OF UPPER-INNER QUADRANT OF LEFT BREAST IN FEMALE, ESTROGEN RECEPTOR POSITIVE (HCC): ICD-10-CM

## 2020-07-09 DIAGNOSIS — M19.041 OSTEOARTHRITIS OF FINGERS OF HANDS, BILATERAL: ICD-10-CM

## 2020-07-09 DIAGNOSIS — R20.2 NUMBNESS AND TINGLING IN BOTH HANDS: Primary | ICD-10-CM

## 2020-07-09 DIAGNOSIS — F41.1 GAD (GENERALIZED ANXIETY DISORDER): ICD-10-CM

## 2020-07-09 DIAGNOSIS — R20.0 NUMBNESS AND TINGLING IN BOTH HANDS: Primary | ICD-10-CM

## 2020-07-09 DIAGNOSIS — C50.212 MALIGNANT NEOPLASM OF UPPER-INNER QUADRANT OF LEFT BREAST IN FEMALE, ESTROGEN RECEPTOR POSITIVE (HCC): ICD-10-CM

## 2020-07-09 DIAGNOSIS — M18.0 ARTHRITIS OF CARPOMETACARPAL (CMC) JOINT OF BOTH THUMBS: ICD-10-CM

## 2020-07-09 DIAGNOSIS — M19.042 OSTEOARTHRITIS OF FINGERS OF HANDS, BILATERAL: ICD-10-CM

## 2020-07-09 PROCEDURE — 99214 OFFICE O/P EST MOD 30 MIN: CPT | Performed by: PHYSICAL MEDICINE & REHABILITATION

## 2020-07-09 NOTE — PROGRESS NOTES
[address]  NEW PATIENT EVALUATION    Chief Complaint: bilateral hand pain. HISTORY OF PRESENT ILLNESS:   Patient presents with: Other: LOV 11/2019 bilateral steroid theumb injections.   Pt states procedu like the Voltaren gel is very helpful when her pain is very severe and flared up however this only helps temporarily. Pain is worse at nighttime after a full day of activity.   If she uses her hands more frequently she notices more pain along the [facility] Patient additionally has neck pain and has had EMG testing with me in the past which is notable for an acute left C8 radiculopathy but no evidence of carpal tunnel syndrome.   Patient has been doing physical therapy for her neck complaints and has noted imp daily as needed. • fluconazole (DIFLUCAN) 150 MG Oral Tab Take one tablet po 1 tablet 0   • exemestane 25 MG Oral Tab Take 25 mg by mouth daily. • Venlafaxine HCl ER 75 MG Oral Capsule SR 24 Hr Take 75 mg by mouth daily.      • Diclofenac Sodium 1 % Gastrointestinal  Bowel Incontinence: denies  Heartburn: denies   Genitourinary  Difficulty Urinating: denies  Bladder Incontinence: denies   Musculoskeletal  Joint Stiffness: admits  Painful Joints: admits   Neurological  Loss of Strength Since last Vis  (H) 12/30/2019    BUN 15 12/30/2019    BUNCREA 12.7 12/30/2019    CREATSERUM 1.18 (H) 12/30/2019    ANIONGAP 4 12/30/2019    GFRNAA 50 (L) 12/30/2019    GFRAA 58 (L) 12/30/2019    CA 9.0 12/30/2019    OSMOCALC 294 12/30/2019    ALKPHO 75 12/30/2 should continue ice 2-3 times daily for the hands, I have given her a new order for occupational therapy to assess for splinting and giving her range of motion and strengthening exercises.   Additionally, she should continue the topical Voltaren gel and fol

## 2020-07-09 NOTE — PATIENT INSTRUCTIONS
-Start OT and home exercises  -Continue Naproxen  -Ice more frequently  -Bracing to be assessed  -MRI of the spine once approved  -Follow up in 4 weeks

## 2020-07-09 NOTE — TELEPHONE ENCOUNTER
Called Medicaid  for insurance coverage of   MRI SPINE CERVICAL CPT=72141 (3T MRI) - APPROVED   . Insurance was verified and procedure is a cover benefit and does NOT require authorization.     Contact patient and informed of approval. Can proceed to schedu

## 2020-07-21 ENCOUNTER — TELEPHONE (OUTPATIENT)
Dept: OCCUPATIONAL MEDICINE | Facility: HOSPITAL | Age: 60
End: 2020-07-21

## 2020-07-22 ENCOUNTER — OFFICE VISIT (OUTPATIENT)
Dept: OCCUPATIONAL MEDICINE | Facility: HOSPITAL | Age: 60
End: 2020-07-22
Attending: INTERNAL MEDICINE
Payer: MEDICAID

## 2020-07-22 PROCEDURE — 97140 MANUAL THERAPY 1/> REGIONS: CPT | Performed by: OCCUPATIONAL THERAPIST

## 2020-07-22 PROCEDURE — 97166 OT EVAL MOD COMPLEX 45 MIN: CPT | Performed by: OCCUPATIONAL THERAPIST

## 2020-07-22 NOTE — PROGRESS NOTES
OCCUPATIONAL THERAPY UPPER EXTREMITY EVALUATION:   Referring Physician: Dr. Johnny Mena  Date of onset: 2015  Diagnosis:Numbness and tingling in both hands, OA of fingers Date of Service: 07/22/20       PATIENT SUMMARY:   Emelia Rubinstein  is a 60year old y/o Ivelisse Stack is a pleasant older woman who came to therapy complaining of bilateral hand pain and paresthesia. She has tenderness to touch at thumb CMC and pain also reported in hypothenar eminence. She is currently taking chemotherapy.  She lives with her hus Radial abduction: 55 degrees  Palmar abduction: 55           Strength (lbs) Right            Trial          1           2            3          Average Left               Trial           1           2            3          Average   : 18*       *18 Patient will demonstrate independence with don/doff thumb CMC orthosis. Frequency / Duration: Patient will be seen for 1-2 x/week or a total of 10  visits over a 60 day period.   Treatment will include: Manual Therapy, Soft tissue mobilization, AROM, PRO

## 2020-07-23 ENCOUNTER — HOSPITAL ENCOUNTER (OUTPATIENT)
Dept: MRI IMAGING | Facility: HOSPITAL | Age: 60
Discharge: HOME | End: 2020-07-23
Attending: PHYSICAL MEDICINE & REHABILITATION
Payer: MEDICAID

## 2020-07-23 DIAGNOSIS — R20.0 NUMBNESS AND TINGLING IN BOTH HANDS: ICD-10-CM

## 2020-07-23 DIAGNOSIS — R20.2 NUMBNESS AND TINGLING IN BOTH HANDS: ICD-10-CM

## 2020-08-03 ENCOUNTER — HOSPITAL ENCOUNTER (OUTPATIENT)
Dept: MRI IMAGING | Age: 60
Discharge: HOME | End: 2020-08-03
Attending: PHYSICAL MEDICINE & REHABILITATION
Payer: MEDICAID

## 2020-08-03 ENCOUNTER — TELEPHONE (OUTPATIENT)
Dept: OBGYN CLINIC | Facility: CLINIC | Age: 60
End: 2020-08-03

## 2020-08-03 PROCEDURE — 72141 MRI NECK SPINE W/O DYE: CPT | Performed by: PHYSICAL MEDICINE & REHABILITATION

## 2020-08-03 NOTE — TELEPHONE ENCOUNTER
Patient indicates she is still awaiting results from her recent pap/office visit on 7/2, please send results to her mychart, thanks.

## 2020-08-03 NOTE — TELEPHONE ENCOUNTER
ADVISED NO PAP WAS DONE. IS NOT DUE UNTIL 2021 (LAST DONE 2018). PT STATES SHE HAS H/O BREAST CANCER AND DUE TO MEDS SHE IS ON THOUGHT SHE NEEDED A PAP MORE OFTEN.   PT AWARE CAP IS OUT OF THE OFFICE UNTIL NEXT WEEK BUT WILL CHECK WITH HER FOR CONFIRMATIO

## 2020-08-06 ENCOUNTER — OFFICE VISIT (OUTPATIENT)
Dept: OCCUPATIONAL MEDICINE | Facility: HOSPITAL | Age: 60
End: 2020-08-06
Attending: PHYSICAL MEDICINE & REHABILITATION
Payer: MEDICAID

## 2020-08-06 ENCOUNTER — OFFICE VISIT (OUTPATIENT)
Dept: NEUROLOGY | Facility: CLINIC | Age: 60
End: 2020-08-06
Payer: MEDICAID

## 2020-08-06 ENCOUNTER — TELEPHONE (OUTPATIENT)
Dept: NEUROLOGY | Facility: CLINIC | Age: 60
End: 2020-08-06

## 2020-08-06 VITALS — HEIGHT: 68 IN | WEIGHT: 175 LBS | BODY MASS INDEX: 26.52 KG/M2

## 2020-08-06 DIAGNOSIS — M19.042 OSTEOARTHRITIS OF FINGERS OF HANDS, BILATERAL: ICD-10-CM

## 2020-08-06 DIAGNOSIS — C50.212 MALIGNANT NEOPLASM OF UPPER-INNER QUADRANT OF LEFT BREAST IN FEMALE, ESTROGEN RECEPTOR POSITIVE (HCC): ICD-10-CM

## 2020-08-06 DIAGNOSIS — M18.0 ARTHRITIS OF CARPOMETACARPAL (CMC) JOINT OF BOTH THUMBS: ICD-10-CM

## 2020-08-06 DIAGNOSIS — M54.12 CERVICAL RADICULOPATHY: ICD-10-CM

## 2020-08-06 DIAGNOSIS — M19.041 OSTEOARTHRITIS OF FINGERS OF HANDS, BILATERAL: ICD-10-CM

## 2020-08-06 DIAGNOSIS — M50.20 HNP (HERNIATED NUCLEUS PULPOSUS), CERVICAL: Primary | ICD-10-CM

## 2020-08-06 DIAGNOSIS — R20.0 NUMBNESS AND TINGLING IN BOTH HANDS: ICD-10-CM

## 2020-08-06 DIAGNOSIS — F41.1 GAD (GENERALIZED ANXIETY DISORDER): ICD-10-CM

## 2020-08-06 DIAGNOSIS — M48.02 SPINAL STENOSIS IN CERVICAL REGION: ICD-10-CM

## 2020-08-06 DIAGNOSIS — R20.2 NUMBNESS AND TINGLING IN BOTH HANDS: ICD-10-CM

## 2020-08-06 DIAGNOSIS — Z17.0 MALIGNANT NEOPLASM OF UPPER-INNER QUADRANT OF LEFT BREAST IN FEMALE, ESTROGEN RECEPTOR POSITIVE (HCC): ICD-10-CM

## 2020-08-06 PROCEDURE — 3008F BODY MASS INDEX DOCD: CPT | Performed by: PHYSICAL MEDICINE & REHABILITATION

## 2020-08-06 PROCEDURE — 97140 MANUAL THERAPY 1/> REGIONS: CPT | Performed by: OCCUPATIONAL THERAPIST

## 2020-08-06 PROCEDURE — 99214 OFFICE O/P EST MOD 30 MIN: CPT | Performed by: PHYSICAL MEDICINE & REHABILITATION

## 2020-08-06 PROCEDURE — 97110 THERAPEUTIC EXERCISES: CPT | Performed by: OCCUPATIONAL THERAPIST

## 2020-08-06 RX ORDER — GABAPENTIN 300 MG/1
CAPSULE ORAL
Qty: 90 CAPSULE | Refills: 0 | Status: SHIPPED | OUTPATIENT
Start: 2020-08-06 | End: 2020-10-01

## 2020-08-06 NOTE — TELEPHONE ENCOUNTER
Called Medicaid to verify eligibility. Per automated telephone system, patient is eligible for services effective thru 08/31/20. Note: Eligibility needs to be verify on a month to month basis.

## 2020-08-06 NOTE — PROGRESS NOTES
[address]  FOLLOW-UP EVALUATION    Chief Complaint: Neck and bilateral arm pain. HISTORY OF PRESENT ILLNESS:   Patient presents with:  Hand Pain: LOV: 7/9/20. F/U on bilateral thumb pain.  Patient states as well associated with this but denies any specific radiation from the neck into the arms. Patient continues to be treated for the breast cancer with exemestane. She is noticing hair loss, and tooth loss as well.   She denies any numbness tingling sensat in nature. H&P:  The patient is a 60year old right handed female with significant past medical history of breast CA s/p radiation and chemotherapy, right knee OA, anxiety who presents with bilateral hand pain.   Patient denies any recent injury or traum \"Neuroma 3 rt\"   • Metatarsalgia 04-    \"modified insole / rx naprosyn\"   • PONV (postoperative nausea and vomiting)    • Right knee injury 1989    Arthroscopy   • Visual impairment     glasses/contacts,dry eye         PAST SURGICAL HISTORY: Suspension 2 sprays by Nasal route daily. 1 Bottle 3   • Fexofenadine HCl (MUCINEX ALLERGY OR) Take by mouth. • PATANOL 0.1 % Ophthalmic Solution Place 1 drop into both eyes daily as needed.              ALLERGIES:     Banana                  HIVES  Can lesions noted   Cognition: alert & oriented x 3, attentive, able to follow 2 step commands, comprehention intact, spontaneous speech intact  Psychiatric: Mood and affect appropriate    Gait Normal  Posture: No scoliosis or kyphosis    Musculoskeletal/Neuro narrowing. 2. Multilevel variable facet arthrosis as described. 3. Fax        Xray Bilateral Hands reviewed: Notable for minimal DJD of the Aia 16 joints in both thumbs left worse than right. All imaging results were reviewed and discussed with patient. injection during follow-up visit. The patient verbalized understanding with this plan and was in agreement. There are no barriers to learning. All questions were answered.     Stephania Wu DO, FAAPMR & CAQSM  Physical Medicine and Rehabilitation/Sports [facility]

## 2020-08-06 NOTE — PROGRESS NOTES
Dx:    Diagnosis:Numbness and tingling in both hands, OA of fingers      Authorized # of Visits:  18 authorized (recommended 10)         Next MD visit: none scheduled  Fall Risk: standard         Precautions: n/a           Medication Changes since last v progress achieved in OT. Patient will demonstrate increase in left wrist flexion to at least 65 degrees for ease in opening jar  Patient will demonstrate increase in bilateral thumb flexion MP to 55 degrees for ease in washing dishes.   Patient will jayro

## 2020-08-06 NOTE — PATIENT INSTRUCTIONS
-Start PT and home exercises  -Start Gabapentin TID slowly  -Ice/Heat for your neck  -See oncologist for discussion exemestane  -Continue OT for the hands  -If no better, will discuss epidural

## 2020-08-07 ENCOUNTER — OFFICE VISIT (OUTPATIENT)
Dept: OCCUPATIONAL MEDICINE | Facility: HOSPITAL | Age: 60
End: 2020-08-07
Attending: PHYSICAL MEDICINE & REHABILITATION
Payer: MEDICAID

## 2020-08-07 DIAGNOSIS — R20.2 NUMBNESS AND TINGLING IN BOTH HANDS: ICD-10-CM

## 2020-08-07 DIAGNOSIS — R20.0 NUMBNESS AND TINGLING IN BOTH HANDS: ICD-10-CM

## 2020-08-07 DIAGNOSIS — M19.041 OSTEOARTHRITIS OF FINGERS OF HANDS, BILATERAL: ICD-10-CM

## 2020-08-07 DIAGNOSIS — M19.042 OSTEOARTHRITIS OF FINGERS OF HANDS, BILATERAL: ICD-10-CM

## 2020-08-07 PROCEDURE — 97140 MANUAL THERAPY 1/> REGIONS: CPT | Performed by: OCCUPATIONAL THERAPIST

## 2020-08-07 PROCEDURE — 97110 THERAPEUTIC EXERCISES: CPT | Performed by: OCCUPATIONAL THERAPIST

## 2020-08-07 PROCEDURE — 97760 ORTHOTIC MGMT&TRAING 1ST ENC: CPT | Performed by: OCCUPATIONAL THERAPIST

## 2020-08-07 NOTE — PROGRESS NOTES
Dx:    Diagnosis:Numbness and tingling in both hands, OA of fingers      Authorized # of Visits:  18 authorized (recommended 10)         Next MD visit: none scheduled  Fall Risk: standard         Precautions: n/a           Medication Changes since last v demonstrated independence with don/doff orthosis. Goals:   Pt complaints of pain in both thumb CMC will decrease at worst to 2/10. Pt will be independent and compliant with comprehensive HEP to maintain progress achieved in OT.   Patient will demonstrat

## 2020-08-10 ENCOUNTER — OFFICE VISIT (OUTPATIENT)
Dept: OCCUPATIONAL MEDICINE | Facility: HOSPITAL | Age: 60
End: 2020-08-10
Attending: PHYSICAL MEDICINE & REHABILITATION
Payer: MEDICAID

## 2020-08-10 DIAGNOSIS — M19.041 OSTEOARTHRITIS OF FINGERS OF HANDS, BILATERAL: ICD-10-CM

## 2020-08-10 DIAGNOSIS — R20.0 NUMBNESS AND TINGLING IN BOTH HANDS: ICD-10-CM

## 2020-08-10 DIAGNOSIS — R20.2 NUMBNESS AND TINGLING IN BOTH HANDS: ICD-10-CM

## 2020-08-10 DIAGNOSIS — M19.042 OSTEOARTHRITIS OF FINGERS OF HANDS, BILATERAL: ICD-10-CM

## 2020-08-10 PROCEDURE — 97110 THERAPEUTIC EXERCISES: CPT | Performed by: OCCUPATIONAL THERAPIST

## 2020-08-10 PROCEDURE — 97140 MANUAL THERAPY 1/> REGIONS: CPT | Performed by: OCCUPATIONAL THERAPIST

## 2020-08-10 NOTE — TELEPHONE ENCOUNTER
Breast cancer and cervical cancer are fortunately unrelated- cervical cancer is almost always caused by the HPV virus and since she was negative for HPV paps are only needed every 3-5 years.   Breast cancer does not increase the risk for cervical cancer the

## 2020-08-10 NOTE — TELEPHONE ENCOUNTER
Notified pt of CAP's recs below. Pt is appreciative and apologized states she heard somewhere that it can be related. Advised pt apology is not needed and encouraged pt to ask anytime she has a question or concern. Pt agrees.

## 2020-08-10 NOTE — PROGRESS NOTES
Dx:    Diagnosis:Numbness and tingling in both hands, OA of fingers      Authorized # of Visits:  18 authorized (recommended 10)         Next MD visit: none scheduled  Fall Risk: standard         Precautions: n/a           Medication Changes since last v Therapeutic Activity                                       Neuromuscular Re-education                                                             Modalities                    moist heat    4 min  4 min  4 min

## 2020-08-12 ENCOUNTER — OFFICE VISIT (OUTPATIENT)
Dept: OCCUPATIONAL MEDICINE | Facility: HOSPITAL | Age: 60
End: 2020-08-12
Attending: PHYSICAL MEDICINE & REHABILITATION
Payer: MEDICAID

## 2020-08-12 DIAGNOSIS — R20.0 NUMBNESS AND TINGLING IN BOTH HANDS: ICD-10-CM

## 2020-08-12 DIAGNOSIS — R20.2 NUMBNESS AND TINGLING IN BOTH HANDS: ICD-10-CM

## 2020-08-12 DIAGNOSIS — M19.042 OSTEOARTHRITIS OF FINGERS OF HANDS, BILATERAL: ICD-10-CM

## 2020-08-12 DIAGNOSIS — M19.041 OSTEOARTHRITIS OF FINGERS OF HANDS, BILATERAL: ICD-10-CM

## 2020-08-12 PROCEDURE — 97018 PARAFFIN BATH THERAPY: CPT | Performed by: OCCUPATIONAL THERAPIST

## 2020-08-12 PROCEDURE — 97110 THERAPEUTIC EXERCISES: CPT | Performed by: OCCUPATIONAL THERAPIST

## 2020-08-12 PROCEDURE — 97140 MANUAL THERAPY 1/> REGIONS: CPT | Performed by: OCCUPATIONAL THERAPIST

## 2020-08-12 NOTE — PROGRESS NOTES
Dx:    Diagnosis:Numbness and tingling in both hands, OA of fingers      Authorized # of Visits:  18 authorized (recommended 10)         Next MD visit: none scheduled  Fall Risk: standard         Precautions: n/a           Medication Changes since last v HEP instruction   shoulder wrist and digit AROM x 10, 4x/day    Instruct patient on wear and care of orthosis.                                  Therapeutic Activity                                       Neuromuscular Re-education

## 2020-08-13 ENCOUNTER — TELEPHONE (OUTPATIENT)
Dept: PHYSICAL THERAPY | Age: 60
End: 2020-08-13

## 2020-08-17 ENCOUNTER — OFFICE VISIT (OUTPATIENT)
Dept: OCCUPATIONAL MEDICINE | Facility: HOSPITAL | Age: 60
End: 2020-08-17
Attending: PHYSICAL MEDICINE & REHABILITATION
Payer: MEDICAID

## 2020-08-17 DIAGNOSIS — M19.041 OSTEOARTHRITIS OF FINGERS OF HANDS, BILATERAL: ICD-10-CM

## 2020-08-17 DIAGNOSIS — M19.042 OSTEOARTHRITIS OF FINGERS OF HANDS, BILATERAL: ICD-10-CM

## 2020-08-17 DIAGNOSIS — R20.2 NUMBNESS AND TINGLING IN BOTH HANDS: ICD-10-CM

## 2020-08-17 DIAGNOSIS — R20.0 NUMBNESS AND TINGLING IN BOTH HANDS: ICD-10-CM

## 2020-08-17 PROCEDURE — 97140 MANUAL THERAPY 1/> REGIONS: CPT | Performed by: OCCUPATIONAL THERAPIST

## 2020-08-17 PROCEDURE — 97110 THERAPEUTIC EXERCISES: CPT | Performed by: OCCUPATIONAL THERAPIST

## 2020-08-17 PROCEDURE — 97018 PARAFFIN BATH THERAPY: CPT | Performed by: OCCUPATIONAL THERAPIST

## 2020-08-17 NOTE — PROGRESS NOTES
Dx:    Diagnosis:Numbness and tingling in both hands, OA of fingers      Authorized # of Visits:  18 authorized (recommended 10)         Next MD visit: none scheduled  Fall Risk: standard         Precautions: n/a           Medication Changes since last v clothespin x 40  red putty, composite flexion with potato masher, lateral and three point pinch 5 min    Velcro checkers , two point pinch, three point pinch of green clothespin x 40        sensory bin#3    large chips and    large chips and marbles 10 eac

## 2020-08-18 ENCOUNTER — OFFICE VISIT (OUTPATIENT)
Dept: OCCUPATIONAL MEDICINE | Facility: HOSPITAL | Age: 60
End: 2020-08-18
Attending: PHYSICAL MEDICINE & REHABILITATION
Payer: MEDICAID

## 2020-08-18 DIAGNOSIS — M19.042 OSTEOARTHRITIS OF FINGERS OF HANDS, BILATERAL: ICD-10-CM

## 2020-08-18 DIAGNOSIS — R20.0 NUMBNESS AND TINGLING IN BOTH HANDS: ICD-10-CM

## 2020-08-18 DIAGNOSIS — R20.2 NUMBNESS AND TINGLING IN BOTH HANDS: ICD-10-CM

## 2020-08-18 DIAGNOSIS — M19.041 OSTEOARTHRITIS OF FINGERS OF HANDS, BILATERAL: ICD-10-CM

## 2020-08-18 PROCEDURE — 97110 THERAPEUTIC EXERCISES: CPT | Performed by: OCCUPATIONAL THERAPIST

## 2020-08-18 PROCEDURE — 97018 PARAFFIN BATH THERAPY: CPT | Performed by: OCCUPATIONAL THERAPIST

## 2020-08-18 PROCEDURE — 97140 MANUAL THERAPY 1/> REGIONS: CPT | Performed by: OCCUPATIONAL THERAPIST

## 2020-08-18 NOTE — PROGRESS NOTES
Dx:    Diagnosis:Numbness and tingling in both hands, OA of fingers      Authorized # of Visits:  18 authorized (recommended 10)         Next MD visit: none scheduled  Fall Risk: standard         Precautions: n/a           Medication Changes since last v clothespin x 40        Velcro checkers , two point pinch, three point pinch of green clothespin x 40  red putty, composite flexion with potato masher, lateral and three point pinch 5 min    Velcro checkers , two point pinch, three point pinch of green clot promoting AROM and strengthening for functional grasping.     Charges:MT,TE2,P  Total Timed Treatment: 40 min  Total Treatment Time: 45 min

## 2020-08-19 ENCOUNTER — TELEPHONE (OUTPATIENT)
Dept: NEUROLOGY | Facility: CLINIC | Age: 60
End: 2020-08-19

## 2020-08-19 DIAGNOSIS — M17.0 PRIMARY OSTEOARTHRITIS OF KNEES, BILATERAL: Primary | ICD-10-CM

## 2020-08-19 NOTE — TELEPHONE ENCOUNTER
Patient requesting [facility]. Informed patient there is no order for her injection and it will most likely not be authorization by tomorrow.  Patient verbalized understanding and thought it was ready to go since they disc

## 2020-08-19 NOTE — TELEPHONE ENCOUNTER
Bilateral knee aspiration and injection with Hyaluronic Acid (Gel one preferrred, if not Synvisc One) CPT Code: 87394, ,, 25799 x2- APPROVED    Called Medicaid to verify eligibility.  Per automated telephone system, patient is eligible for service

## 2020-08-19 NOTE — TELEPHONE ENCOUNTER
Synvisc injection order placed for knee osteoarthritis. Please have patient follow-up tomorrow to discuss further and for procedure.     Carie Cadena DO, FAAPMR & CAQSM  Physical Medicine and Rehabilitation/Sports Medicine  [facility]

## 2020-08-20 ENCOUNTER — OFFICE VISIT (OUTPATIENT)
Dept: NEUROLOGY | Facility: CLINIC | Age: 60
End: 2020-08-20
Payer: MEDICAID

## 2020-08-20 VITALS
HEIGHT: 68 IN | WEIGHT: 170 LBS | DIASTOLIC BLOOD PRESSURE: 64 MMHG | SYSTOLIC BLOOD PRESSURE: 124 MMHG | BODY MASS INDEX: 25.76 KG/M2

## 2020-08-20 DIAGNOSIS — M17.0 PRIMARY OSTEOARTHRITIS OF KNEES, BILATERAL: Primary | ICD-10-CM

## 2020-08-20 PROCEDURE — 99214 OFFICE O/P EST MOD 30 MIN: CPT | Performed by: PHYSICAL MEDICINE & REHABILITATION

## 2020-08-20 PROCEDURE — 3008F BODY MASS INDEX DOCD: CPT | Performed by: PHYSICAL MEDICINE & REHABILITATION

## 2020-08-20 PROCEDURE — 3074F SYST BP LT 130 MM HG: CPT | Performed by: PHYSICAL MEDICINE & REHABILITATION

## 2020-08-20 PROCEDURE — 3078F DIAST BP <80 MM HG: CPT | Performed by: PHYSICAL MEDICINE & REHABILITATION

## 2020-08-20 PROCEDURE — 20610 DRAIN/INJ JOINT/BURSA W/O US: CPT | Performed by: PHYSICAL MEDICINE & REHABILITATION

## 2020-08-20 RX ORDER — LIDOCAINE HYDROCHLORIDE 10 MG/ML
3 INJECTION, SOLUTION INFILTRATION; PERINEURAL ONCE
Status: COMPLETED | OUTPATIENT
Start: 2020-08-20 | End: 2020-08-20

## 2020-08-20 RX ORDER — TAMOXIFEN CITRATE 20 MG/1
20 TABLET ORAL DAILY
COMMUNITY
End: 2021-09-15

## 2020-08-20 NOTE — PROGRESS NOTES
[address]  FOLLOW-UP EVALUATION    Chief Complaint: Neck and bilateral arm pain. HISTORY OF PRESENT ILLNESS:   Patient presents with: Injection: Patient here for injection in right knee.   Pt c/o chronic thumbs and index fingers. She cannot identify any specific exacerbating factors as the numbness tingling is constant in the fingers though she has worsening pain with mobility of the cervical spine.   She has not been taking any medication specifically for improved with nothing. 11/20/19  Patient is here for follow-up of bilateral thumb pain. She has been wearing the braces and using topical Voltaren gel as well as icing but has not noted significant improvement.   She feels like the Voltaren gel is very She denies any fevers chills or recent weight loss though is on weight watchers and is trying to lose weight. She denies any numbness tingling in particular in the hands but does have some of the symptoms in the arms.   Patient additionally has neck pain a LARGE BREAST Bilateral 2014   • REDUCTION RIGHT  05/2014         CURRENT MEDICATIONS:     Current Outpatient Medications   Medication Sig Dispense Refill   • Tamoxifen Citrate 20 MG Oral Tab Take 20 mg by mouth daily.      • gabapentin 300 MG Oral Cap Start Packs/day: 0.20        Years: 18.00        Pack years: 3.6        Types: Cigarettes        Quit date: 1988        Years since quittin.6      Smokeless tobacco: Never Used      Tobacco comment: socially    Alcohol use:  Yes      Alcohol/week: 0.8 st motion due to swelling  Strength: 5/5  Sensation: Intact to light touch in all dermatomes of the lower extremities    WRIST:  Inspection: no erythema, mild swelling of the MCP joints of the index fingers bilaterally, no obvious deformity  Palpation: Very t both thumbs left worse than right. All imaging results were reviewed and discussed with patient. ASSESSMENT:     1. Primary osteoarthritis of knees, bilateral        MsAron Anthony is a pleasant 59-year-old female who presents for evaluation

## 2020-08-20 NOTE — PATIENT INSTRUCTIONS
Steroid Injection Information  What to expect: The injection contains Lidocaine (which numbs the area) and synvisc. You may have pain relief within hours of the injection due to the Lidocaine.   The Synvisc   can take a couple days, up to a couple weeks, t

## 2020-08-20 NOTE — PROGRESS NOTES
Synvisc Injection Information  What to expect: The injection contains Lidocaine (which numbs the area) and Synvisc (which decreases inflammation). You may have pain relief within hours of the injection due to the Lidocaine.   The Synvisc can take a couple

## 2020-08-20 NOTE — PROGRESS NOTES
Synvisc Injection Information  What to expect: The injection contains Lidocaine (which numbs the area) and Synvisc (a steroid which decreases inflammation). You may have pain relief within hours of the injection due to the Lidocaine.   The Synvisc can take

## 2020-08-27 ENCOUNTER — OFFICE VISIT (OUTPATIENT)
Dept: OCCUPATIONAL MEDICINE | Facility: HOSPITAL | Age: 60
End: 2020-08-27
Attending: PHYSICAL MEDICINE & REHABILITATION
Payer: MEDICAID

## 2020-08-27 DIAGNOSIS — R20.0 NUMBNESS AND TINGLING IN BOTH HANDS: ICD-10-CM

## 2020-08-27 DIAGNOSIS — R20.2 NUMBNESS AND TINGLING IN BOTH HANDS: ICD-10-CM

## 2020-08-27 DIAGNOSIS — M19.042 OSTEOARTHRITIS OF FINGERS OF HANDS, BILATERAL: ICD-10-CM

## 2020-08-27 DIAGNOSIS — M19.041 OSTEOARTHRITIS OF FINGERS OF HANDS, BILATERAL: ICD-10-CM

## 2020-08-27 PROCEDURE — 97018 PARAFFIN BATH THERAPY: CPT | Performed by: OCCUPATIONAL THERAPIST

## 2020-08-27 PROCEDURE — 97110 THERAPEUTIC EXERCISES: CPT | Performed by: OCCUPATIONAL THERAPIST

## 2020-08-27 PROCEDURE — 97140 MANUAL THERAPY 1/> REGIONS: CPT | Performed by: OCCUPATIONAL THERAPIST

## 2020-08-27 NOTE — PROGRESS NOTES
Dx:    Diagnosis:Numbness and tingling in both hands, OA of fingers      Authorized # of Visits:  18 authorized (recommended 10)         Next MD visit: none scheduled  Fall Risk: standard         Precautions: n/a           Medication Changes since last v with CMC thumb orthosis donned.   pulleys overhead shoulder stretch x 10          Velcro checkers , two point pinch, three point pinch of green clothespin x 40        Velcro checkers , two point pinch, three point pinch of green clothespin x 40  red putty, left wrist flexion to at least 65 degrees for ease in opening jar. .(Achieved)  Patient will demonstrate increase in bilateral thumb flexion MP to 55 degrees for ease in washing dishes.   Patient will demonstrate increase in bilateral  strength to at omar

## 2020-08-28 ENCOUNTER — APPOINTMENT (OUTPATIENT)
Dept: OCCUPATIONAL MEDICINE | Facility: HOSPITAL | Age: 60
End: 2020-08-28
Attending: PHYSICAL MEDICINE & REHABILITATION
Payer: MEDICAID

## 2020-09-01 ENCOUNTER — OFFICE VISIT (OUTPATIENT)
Dept: OCCUPATIONAL MEDICINE | Facility: HOSPITAL | Age: 60
End: 2020-09-01
Attending: PHYSICAL MEDICINE & REHABILITATION
Payer: MEDICAID

## 2020-09-01 DIAGNOSIS — R20.0 NUMBNESS AND TINGLING IN BOTH HANDS: ICD-10-CM

## 2020-09-01 DIAGNOSIS — M19.042 OSTEOARTHRITIS OF FINGERS OF HANDS, BILATERAL: ICD-10-CM

## 2020-09-01 DIAGNOSIS — M19.041 OSTEOARTHRITIS OF FINGERS OF HANDS, BILATERAL: ICD-10-CM

## 2020-09-01 DIAGNOSIS — R20.2 NUMBNESS AND TINGLING IN BOTH HANDS: ICD-10-CM

## 2020-09-01 PROCEDURE — 97140 MANUAL THERAPY 1/> REGIONS: CPT | Performed by: OCCUPATIONAL THERAPIST

## 2020-09-01 PROCEDURE — 97018 PARAFFIN BATH THERAPY: CPT | Performed by: OCCUPATIONAL THERAPIST

## 2020-09-01 PROCEDURE — 97110 THERAPEUTIC EXERCISES: CPT | Performed by: OCCUPATIONAL THERAPIST

## 2020-09-01 NOTE — PROGRESS NOTES
Dx:   Diagnosis:Numbness and tingling in both hands, OA of fingers         Authorized # of Visits:  10         Next MD visit: none scheduled  Fall Risk: standard         Precautions: n/a           Medication Changes since last visit?: No  Subjective:  \" in bilateral thumb flexion MP to 55 degrees for ease in washing dishes. Patient will demonstrate increase in bilateral  strength to at least 25 lbs for functional grasping. Patient will demonstrate independence with don/doff thumb CMC orthosis. .. Jaskaran Oh (Ac

## 2020-09-03 ENCOUNTER — OFFICE VISIT (OUTPATIENT)
Dept: OCCUPATIONAL MEDICINE | Facility: HOSPITAL | Age: 60
End: 2020-09-03
Attending: PHYSICAL MEDICINE & REHABILITATION
Payer: MEDICAID

## 2020-09-03 DIAGNOSIS — M19.042 OSTEOARTHRITIS OF FINGERS OF HANDS, BILATERAL: ICD-10-CM

## 2020-09-03 DIAGNOSIS — M19.041 OSTEOARTHRITIS OF FINGERS OF HANDS, BILATERAL: ICD-10-CM

## 2020-09-03 DIAGNOSIS — R20.2 NUMBNESS AND TINGLING IN BOTH HANDS: ICD-10-CM

## 2020-09-03 DIAGNOSIS — R20.0 NUMBNESS AND TINGLING IN BOTH HANDS: ICD-10-CM

## 2020-09-03 PROCEDURE — 97018 PARAFFIN BATH THERAPY: CPT | Performed by: OCCUPATIONAL THERAPIST

## 2020-09-03 PROCEDURE — 97110 THERAPEUTIC EXERCISES: CPT | Performed by: OCCUPATIONAL THERAPIST

## 2020-09-03 PROCEDURE — 97140 MANUAL THERAPY 1/> REGIONS: CPT | Performed by: OCCUPATIONAL THERAPIST

## 2020-09-03 NOTE — PROGRESS NOTES
Dx:   Diagnosis:Numbness and tingling in both hands, OA of fingers         Authorized # of Visits:  10         Next MD visit: none scheduled  Fall Risk: standard         Precautions: n/a           Medication Changes since last visit?: No  Subjective:  \" \"The pain in my thumbs is tolerable I'm able to do a lot more now at home. \" pain 4/10    Assessment: Patient has made progress in improving wrist and thumb AROM as well as  strength. Pain is reported as manageable.  Since start of OT she improved left precautions, and treatment options and has agreed to actively participate in planning and for this course of care. Thank you for your referral. If you have any questions, please contact me at Dept: [phone].     Sincerely,  Electronically signed by yoni

## 2020-09-09 ENCOUNTER — OFFICE VISIT (OUTPATIENT)
Dept: PHYSICAL THERAPY | Age: 60
End: 2020-09-09
Attending: PHYSICAL MEDICINE & REHABILITATION
Payer: MEDICAID

## 2020-09-09 DIAGNOSIS — M54.12 CERVICAL RADICULOPATHY: ICD-10-CM

## 2020-09-09 DIAGNOSIS — M50.20 HNP (HERNIATED NUCLEUS PULPOSUS), CERVICAL: ICD-10-CM

## 2020-09-09 DIAGNOSIS — M48.02 SPINAL STENOSIS IN CERVICAL REGION: ICD-10-CM

## 2020-09-09 PROCEDURE — 97110 THERAPEUTIC EXERCISES: CPT

## 2020-09-09 PROCEDURE — 97162 PT EVAL MOD COMPLEX 30 MIN: CPT

## 2020-09-09 PROCEDURE — 97530 THERAPEUTIC ACTIVITIES: CPT

## 2020-09-09 NOTE — PROGRESS NOTES
SPINE EVALUATION:   Referring Physician: Dr. Davonna Schirmer  Diagnosis:    HNP (herniated nucleus pulposus), cervical (M50.20)  Cervical radiculopathy (M54.12)  Spinal stenosis in cervical region (M48.02) Date of Service: 9/9/2020     PATIENT SUMMARY   Corky POTTER No        Have you recently had thoughts of hurting yourself?   No    Have you tried to hurt yourself in the past?  No          ASSESSMENT  Iris Arpan presents to physical therapy evaluation with primary c/o cervical pain that radiates down her back at the r R 5/5, L 5/5  Digit Flex (C8): R 5/5, L 5/5  Thumb Ext (C8): R 4/5, L 4/5  Interossei (T1): R 5/5, L 5/5    Rhomboids: R 4/5, L 4/5  Mid trap: R 3 -/5; L 4/5         Special tests:   1. Repeated cervical retraction in sitting: centralized symptoms.  [facility] instruction; modalities, prn    Education or treatment limitation: None  Rehab Potential:good    FOTO: 53/100    Patient was advised of these findings, precautions, and treatment options and has agreed to actively partici

## 2020-09-14 ENCOUNTER — OFFICE VISIT (OUTPATIENT)
Dept: PHYSICAL THERAPY | Age: 60
End: 2020-09-14
Attending: PHYSICAL MEDICINE & REHABILITATION
Payer: MEDICAID

## 2020-09-14 DIAGNOSIS — M50.20 HNP (HERNIATED NUCLEUS PULPOSUS), CERVICAL: ICD-10-CM

## 2020-09-14 DIAGNOSIS — M54.12 CERVICAL RADICULOPATHY: ICD-10-CM

## 2020-09-14 DIAGNOSIS — M48.02 SPINAL STENOSIS IN CERVICAL REGION: ICD-10-CM

## 2020-09-14 PROCEDURE — 97140 MANUAL THERAPY 1/> REGIONS: CPT

## 2020-09-14 PROCEDURE — 97110 THERAPEUTIC EXERCISES: CPT

## 2020-09-14 NOTE — PROGRESS NOTES
Dx: HNP (herniated nucleus pulposus), cervical (M50.20)  Cervical radiculopathy (M54.12)  Spinal stenosis in cervical region (M48.02)         Insurance (Authorized # of Visits):  12 visits; cert ends 24/4/3171           Authorizing Physician: Dr. David Rosen distraction; CT junction mob  Supine: MFR at scalenes x 5 min  Supine: post mob at B GH joints  Supine: PROM at B GH joints all planes x 6 min                         HEP: sitting cervical retraction ; scap retraction; shoulder flex    Charges: 2 TE, 1 MT

## 2020-09-15 ENCOUNTER — OFFICE VISIT (OUTPATIENT)
Dept: INTERNAL MEDICINE CLINIC | Facility: CLINIC | Age: 60
End: 2020-09-15
Payer: MEDICAID

## 2020-09-15 VITALS
TEMPERATURE: 99 F | WEIGHT: 177 LBS | HEIGHT: 68 IN | HEART RATE: 76 BPM | BODY MASS INDEX: 26.83 KG/M2 | DIASTOLIC BLOOD PRESSURE: 71 MMHG | RESPIRATION RATE: 12 BRPM | SYSTOLIC BLOOD PRESSURE: 109 MMHG

## 2020-09-15 DIAGNOSIS — C50.212 MALIGNANT NEOPLASM OF UPPER-INNER QUADRANT OF LEFT BREAST IN FEMALE, ESTROGEN RECEPTOR POSITIVE (HCC): ICD-10-CM

## 2020-09-15 DIAGNOSIS — M48.02 SPINAL STENOSIS IN CERVICAL REGION: ICD-10-CM

## 2020-09-15 DIAGNOSIS — F41.9 ANXIETY AND DEPRESSION: ICD-10-CM

## 2020-09-15 DIAGNOSIS — F32.A ANXIETY AND DEPRESSION: ICD-10-CM

## 2020-09-15 DIAGNOSIS — Z17.0 MALIGNANT NEOPLASM OF UPPER-INNER QUADRANT OF LEFT BREAST IN FEMALE, ESTROGEN RECEPTOR POSITIVE (HCC): ICD-10-CM

## 2020-09-15 DIAGNOSIS — Z00.00 ANNUAL PHYSICAL EXAM: Primary | ICD-10-CM

## 2020-09-15 PROCEDURE — 3074F SYST BP LT 130 MM HG: CPT | Performed by: INTERNAL MEDICINE

## 2020-09-15 PROCEDURE — 3008F BODY MASS INDEX DOCD: CPT | Performed by: INTERNAL MEDICINE

## 2020-09-15 PROCEDURE — 3078F DIAST BP <80 MM HG: CPT | Performed by: INTERNAL MEDICINE

## 2020-09-15 PROCEDURE — 99396 PREV VISIT EST AGE 40-64: CPT | Performed by: INTERNAL MEDICINE

## 2020-09-15 RX ORDER — VENLAFAXINE HYDROCHLORIDE 75 MG/1
75 CAPSULE, EXTENDED RELEASE ORAL DAILY
Qty: 90 CAPSULE | Refills: 1 | Status: SHIPPED | OUTPATIENT
Start: 2020-09-15 | End: 2021-04-08

## 2020-09-15 RX ORDER — ALPRAZOLAM 0.5 MG/1
0.5 TABLET ORAL 2 TIMES DAILY PRN
Qty: 60 TABLET | Refills: 0 | Status: SHIPPED | OUTPATIENT
Start: 2020-09-15 | End: 2020-10-16

## 2020-09-16 ENCOUNTER — OFFICE VISIT (OUTPATIENT)
Dept: PHYSICAL THERAPY | Age: 60
End: 2020-09-16
Attending: PHYSICAL MEDICINE & REHABILITATION
Payer: MEDICAID

## 2020-09-16 DIAGNOSIS — M54.12 CERVICAL RADICULOPATHY: ICD-10-CM

## 2020-09-16 DIAGNOSIS — M50.20 HNP (HERNIATED NUCLEUS PULPOSUS), CERVICAL: ICD-10-CM

## 2020-09-16 DIAGNOSIS — M48.02 SPINAL STENOSIS IN CERVICAL REGION: ICD-10-CM

## 2020-09-16 PROCEDURE — 97140 MANUAL THERAPY 1/> REGIONS: CPT

## 2020-09-16 PROCEDURE — 97110 THERAPEUTIC EXERCISES: CPT

## 2020-09-16 NOTE — PROGRESS NOTES
Dx: HNP (herniated nucleus pulposus), cervical (M50.20)  Cervical radiculopathy (M54.12)  Spinal stenosis in cervical region (M48.02)         Insurance (Authorized # of Visits):  12 visits; cert ends 80/6/5310           Authorizing Physician: Dr. Jose Melendez 10x2  Sitting: cervical rot: 10x1  Sitting: shoulder flexion with elbow flexed 10x2 Therapeutic Exercise  Supine: Chin tuck 10x2  Supine: alt shoulder flex 10x2  Sitting: cervical retraction 10x1  Sitting: scap retraction 10x2  Sitting: cervical rot: 10x1

## 2020-09-21 ENCOUNTER — OFFICE VISIT (OUTPATIENT)
Dept: PHYSICAL THERAPY | Age: 60
End: 2020-09-21
Attending: PHYSICAL MEDICINE & REHABILITATION
Payer: MEDICAID

## 2020-09-21 DIAGNOSIS — M48.02 SPINAL STENOSIS IN CERVICAL REGION: ICD-10-CM

## 2020-09-21 DIAGNOSIS — M50.20 HNP (HERNIATED NUCLEUS PULPOSUS), CERVICAL: ICD-10-CM

## 2020-09-21 DIAGNOSIS — M54.12 CERVICAL RADICULOPATHY: ICD-10-CM

## 2020-09-21 PROCEDURE — 97140 MANUAL THERAPY 1/> REGIONS: CPT

## 2020-09-21 PROCEDURE — 97110 THERAPEUTIC EXERCISES: CPT

## 2020-09-21 NOTE — PROGRESS NOTES
Dx: HNP (herniated nucleus pulposus), cervical (M50.20)  Cervical radiculopathy (M54.12)  Spinal stenosis in cervical region (M48.02)         Insurance (Authorized # of Visits):  12 visits; cert ends 45/5/0679           Authorizing Physician: Dr. Mitesh Rivas cervical retraction 10x1  Sitting: scap retraction 10x2  Sitting: cervical rot: 10x1  Sitting: shoulder flexion with elbow flexed 10x2 Therapeutic Exercise  Supine: Chin tuck 10x2  Supine: alt shoulder flex 10x2  Sitting: cervical retraction 10x1  Sitting:

## 2020-09-23 ENCOUNTER — OFFICE VISIT (OUTPATIENT)
Dept: PHYSICAL THERAPY | Age: 60
End: 2020-09-23
Attending: PHYSICAL MEDICINE & REHABILITATION
Payer: MEDICAID

## 2020-09-23 DIAGNOSIS — M50.20 HNP (HERNIATED NUCLEUS PULPOSUS), CERVICAL: ICD-10-CM

## 2020-09-23 DIAGNOSIS — M48.02 SPINAL STENOSIS IN CERVICAL REGION: ICD-10-CM

## 2020-09-23 DIAGNOSIS — M54.12 CERVICAL RADICULOPATHY: ICD-10-CM

## 2020-09-23 PROCEDURE — 97140 MANUAL THERAPY 1/> REGIONS: CPT

## 2020-09-23 PROCEDURE — 97110 THERAPEUTIC EXERCISES: CPT

## 2020-09-23 NOTE — PROGRESS NOTES
Dx: HNP (herniated nucleus pulposus), cervical (M50.20)  Cervical radiculopathy (M54.12)  Spinal stenosis in cervical region (M48.02)         Insurance (Authorized # of Visits):  12 visits; cert ends 20/2/3207           Authorizing Physician: Dr. Melida Paz 10x2  Sitting: cervical retraction 10x1  Sitting: scap retraction 10x2  Sitting: cervical rot: 10x1  Sitting: shoulder flexion with elbow flexed 10x2 Therapeutic Exercise  Supine: Chin tuck 10x2  Supine: alt shoulder flex 10x2  Sitting: cervical retraction

## 2020-09-28 ENCOUNTER — OFFICE VISIT (OUTPATIENT)
Dept: PHYSICAL THERAPY | Age: 60
End: 2020-09-28
Attending: PHYSICAL MEDICINE & REHABILITATION
Payer: MEDICAID

## 2020-09-28 DIAGNOSIS — M48.02 SPINAL STENOSIS IN CERVICAL REGION: ICD-10-CM

## 2020-09-28 DIAGNOSIS — M50.20 HNP (HERNIATED NUCLEUS PULPOSUS), CERVICAL: ICD-10-CM

## 2020-09-28 DIAGNOSIS — M54.12 CERVICAL RADICULOPATHY: ICD-10-CM

## 2020-09-28 PROCEDURE — 97110 THERAPEUTIC EXERCISES: CPT

## 2020-09-28 PROCEDURE — 97140 MANUAL THERAPY 1/> REGIONS: CPT

## 2020-09-28 NOTE — PROGRESS NOTES
Dx: HNP (herniated nucleus pulposus), cervical (M50.20)  Cervical radiculopathy (M54.12)  Spinal stenosis in cervical region (M48.02)         Insurance (Authorized # of Visits):  12 visits; cert ends 90/8/9996           Authorizing Physician: Dr. Julia Doll 10x2  Sitting: cervical rot: 10x1  Sitting: shoulder flexion with elbow flexed 10x2 Therapeutic Exercise  Supine: Chin tuck 10x2  Supine: alt shoulder flex 10x2  Supine: reverse flies 10x2  Supine: serratus reach 10x2  Sitting: cervical retraction 10x1  Si shoulder shrug    Charges: 2 TE, 1 MT       Total Timed Treatment: 45 min  Total Treatment Time: 45 min

## 2020-09-30 ENCOUNTER — OFFICE VISIT (OUTPATIENT)
Dept: PHYSICAL THERAPY | Age: 60
End: 2020-09-30
Attending: PHYSICAL MEDICINE & REHABILITATION
Payer: MEDICAID

## 2020-09-30 DIAGNOSIS — M50.20 HNP (HERNIATED NUCLEUS PULPOSUS), CERVICAL: ICD-10-CM

## 2020-09-30 DIAGNOSIS — M48.02 SPINAL STENOSIS IN CERVICAL REGION: ICD-10-CM

## 2020-09-30 DIAGNOSIS — M54.12 CERVICAL RADICULOPATHY: ICD-10-CM

## 2020-09-30 PROCEDURE — 97140 MANUAL THERAPY 1/> REGIONS: CPT

## 2020-09-30 PROCEDURE — 97110 THERAPEUTIC EXERCISES: CPT

## 2020-09-30 NOTE — PROGRESS NOTES
Dx: HNP (herniated nucleus pulposus), cervical (M50.20)  Cervical radiculopathy (M54.12)  Spinal stenosis in cervical region (M48.02)         Insurance (Authorized # of Visits):  12 visits; cert ends 65/6/8364           Authorizing Physician: Dr. Renata Talavera shoulder flex 10x2  Sitting: cervical retraction 10x1  Sitting: scap retraction 10x2  Sitting: cervical rot: 10x1  Sitting: shoulder flexion with elbow flexed 10x2 Therapeutic Exercise  Supine: Chin tuck 10x2  Supine: alt shoulder flex 10x2  Supine: revers min  Supine: post mob at [address] joints  Supine: PROM at B GH joints all planes x 6 min  Clearing clavicle; sternum x 3 min Manual Therapy  Supine: suboccipital release; cervical distraction; CT junction mob  Supine: MFR at [facility] x 4 min  Supine: post mob

## 2020-10-01 RX ORDER — GABAPENTIN 300 MG/1
300 CAPSULE ORAL 3 TIMES DAILY
Qty: 90 CAPSULE | Refills: 0 | Status: SHIPPED | OUTPATIENT
Start: 2020-10-01 | End: 2020-12-22

## 2020-10-01 NOTE — TELEPHONE ENCOUNTER
Refill request for gabapentin 300 mg, TID, #90, no refills    LOV: 8/6/20  NOV: 11/5/20  Last refilled on 8/6/20

## 2020-10-05 ENCOUNTER — OFFICE VISIT (OUTPATIENT)
Dept: PHYSICAL THERAPY | Age: 60
End: 2020-10-05
Attending: PHYSICAL MEDICINE & REHABILITATION
Payer: MEDICAID

## 2020-10-05 DIAGNOSIS — M50.20 HNP (HERNIATED NUCLEUS PULPOSUS), CERVICAL: ICD-10-CM

## 2020-10-05 DIAGNOSIS — M48.02 SPINAL STENOSIS IN CERVICAL REGION: ICD-10-CM

## 2020-10-05 DIAGNOSIS — M54.12 CERVICAL RADICULOPATHY: ICD-10-CM

## 2020-10-05 PROCEDURE — 97140 MANUAL THERAPY 1/> REGIONS: CPT

## 2020-10-05 PROCEDURE — 97110 THERAPEUTIC EXERCISES: CPT

## 2020-10-05 NOTE — PROGRESS NOTES
Dx: HNP (herniated nucleus pulposus), cervical (M50.20)  Cervical radiculopathy (M54.12)  Spinal stenosis in cervical region (M48.02)         Insurance (Authorized # of Visits):  12 visits; cert ends 32/5/7269           Authorizing Physician: Dr. Mckayla Segura 10x2  Supine: alt shoulder flex 10x2  Supine: reverse flies 10x2  Supine: serratus reach 10x2  Sitting: cervical retraction 10x1  Sitting: scap retraction 10x1  Sitting: cervical rot: 10x1  Sitting: shoulder flexion with elbow flexed 10x2 Therapeutic Exerc Therapy  Supine: suboccipital release; cervical distraction; CT junction mob  Supine: MFR at scalenes x 4 min  Supine: post mob at B GH joints  Supine: PROM at B GH joints all planes x 5 min  Clearing clavicle; sternum x 3 min Manual Therapy  Supine: suboc

## 2020-10-07 ENCOUNTER — OFFICE VISIT (OUTPATIENT)
Dept: PHYSICAL THERAPY | Age: 60
End: 2020-10-07
Attending: PHYSICAL MEDICINE & REHABILITATION
Payer: MEDICAID

## 2020-10-07 DIAGNOSIS — M54.12 CERVICAL RADICULOPATHY: ICD-10-CM

## 2020-10-07 DIAGNOSIS — M50.20 HNP (HERNIATED NUCLEUS PULPOSUS), CERVICAL: ICD-10-CM

## 2020-10-07 DIAGNOSIS — M48.02 SPINAL STENOSIS IN CERVICAL REGION: ICD-10-CM

## 2020-10-07 PROCEDURE — 97110 THERAPEUTIC EXERCISES: CPT

## 2020-10-07 PROCEDURE — 97140 MANUAL THERAPY 1/> REGIONS: CPT

## 2020-10-07 NOTE — PROGRESS NOTES
Dx: HNP (herniated nucleus pulposus), cervical (M50.20)  Cervical radiculopathy (M54.12)  Spinal stenosis in cervical region (M48.02)         Insurance (Authorized # of Visits):  12 visits; cert ends 03/5/9198           Authorizing Physician: Dr. Micheline Jo 9/12 Date: 10/7/2020  Tx#: 10/12   Therapeutic Exercise  Supine: Chin tuck 10x1  Supine: reverse flies 10x2  Supine: serratus reach 10x2  Sitting: cervical retraction 10x1  Sitting: scap retraction 10x1  Sitting: cervical rot: 10x1  Sitting: shoulder flexi min  Supine: post mob at B [address] joints  Supine: PROM at B GH joints all planes x 5 min  Clearing clavicle; sternum x 3 min Manual Therapy  Supine: suboccipital release; cervical distraction; CT junction mob  Supine: MFR at [facility] x 4 min  Supine: post mob

## 2020-10-12 ENCOUNTER — APPOINTMENT (OUTPATIENT)
Dept: PHYSICAL THERAPY | Age: 60
End: 2020-10-12
Attending: PHYSICAL MEDICINE & REHABILITATION
Payer: MEDICAID

## 2020-10-14 ENCOUNTER — OFFICE VISIT (OUTPATIENT)
Dept: PHYSICAL THERAPY | Age: 60
End: 2020-10-14
Attending: PHYSICAL MEDICINE & REHABILITATION
Payer: MEDICAID

## 2020-10-14 DIAGNOSIS — M54.12 CERVICAL RADICULOPATHY: ICD-10-CM

## 2020-10-14 DIAGNOSIS — M50.20 HNP (HERNIATED NUCLEUS PULPOSUS), CERVICAL: ICD-10-CM

## 2020-10-14 DIAGNOSIS — M48.02 SPINAL STENOSIS IN CERVICAL REGION: ICD-10-CM

## 2020-10-14 PROCEDURE — 97110 THERAPEUTIC EXERCISES: CPT

## 2020-10-14 PROCEDURE — 97140 MANUAL THERAPY 1/> REGIONS: CPT

## 2020-10-14 NOTE — PROGRESS NOTES
Dx: HNP (herniated nucleus pulposus), cervical (M50.20)  Cervical radiculopathy (M54.12)  Spinal stenosis in cervical region (M48.02)         Insurance (Authorized # of Visits):  12 visits; cert ends 30/2/6417           Authorizing Physician: Dr. Dalton Veras 10/7/2020  Tx#: 9/12 Date: 10/7/2020  Tx#: 10/12 Date: 10/14/2020  Tx#: 11/12   Therapeutic Exercise  Supine: Chin tuck 10x1  Supine: reverse flies 10x2  Supine: serratus reach 10x2  Sitting: cervical rot: 10x1  Sitting: shoulder flexion with elbow flexed joints  Supine: PROM at B GH joints all planes x 5 min  Clearing clavicle; sternum x 3 min Manual Therapy  Supine: suboccipital release; cervical distraction; CT junction mob  Supine: sideglide mobs at mid cervical  Supine: MFR at scalenes x 4 min  Supine:

## 2020-10-16 RX ORDER — ALPRAZOLAM 0.5 MG/1
0.5 TABLET ORAL 2 TIMES DAILY PRN
Qty: 60 TABLET | Refills: 0 | Status: SHIPPED | OUTPATIENT
Start: 2020-10-16 | End: 2020-12-21

## 2020-10-20 ENCOUNTER — APPOINTMENT (OUTPATIENT)
Dept: PHYSICAL THERAPY | Age: 60
End: 2020-10-20
Attending: PHYSICAL MEDICINE & REHABILITATION
Payer: MEDICAID

## 2020-11-04 ENCOUNTER — OFFICE VISIT (OUTPATIENT)
Dept: PHYSICAL THERAPY | Age: 60
End: 2020-11-04
Attending: PHYSICAL MEDICINE & REHABILITATION
Payer: MEDICAID

## 2020-11-04 PROCEDURE — 97110 THERAPEUTIC EXERCISES: CPT

## 2020-11-04 PROCEDURE — 97140 MANUAL THERAPY 1/> REGIONS: CPT

## 2020-11-04 NOTE — PROGRESS NOTES
Dx: HNP (herniated nucleus pulposus), cervical (M50.20)  Cervical radiculopathy (M54.12)  Spinal stenosis in cervical region (M48.02)         Insurance (Authorized # of Visits):  12 visits; cert ends 88/2/5085           Authorizing Physician: Dr. Holden Johnson 4/5  Mid trap: R 3 -/5; L 4/5    Shoulder Flex: R 4/5*, L 5/5  Shoulder ABD (C5): R 4/5*, L 5/5  Biceps (C6): R 5/5, L 5/5  Wrist ext (C6): R 5/5, L 5/5  Triceps (C7): R 5/5, L 5/5  Wrist Flex (C7): R 5/5, L 5/5  Digit Flex (C8): R 4/5*, L 4/5*  Thumb Ext Treatment will include: Patient education; home exercise program; therapeutic exercise; therapeutic activity; manual therapy; neuromuscular re-education; modalities, prn       Patientwas advised of these findings, precautions, and treatment options and has 10x1  Sitting: UT shoulder shrugs: 10x5\"  Standing: Lower Trap Y's: 10x1  Sitting: cervical retraction with extension: 10x1  Sitting: thoracic ext 10x1  Sitting: cervical rotation with eye tracking: 10x1  Standing: scap rows red tband 10x2  Standing: shou ext/shoulder ER with tband    Charges: 2 TE, 1 MT       Total Timed Treatment: 45 min  Total Treatment Time: 45 min

## 2020-11-05 ENCOUNTER — OFFICE VISIT (OUTPATIENT)
Dept: NEUROLOGY | Facility: CLINIC | Age: 60
End: 2020-11-05
Payer: MEDICAID

## 2020-11-05 DIAGNOSIS — M54.12 CERVICAL RADICULOPATHY: ICD-10-CM

## 2020-11-05 DIAGNOSIS — M19.042 OSTEOARTHRITIS OF FINGERS OF HANDS, BILATERAL: ICD-10-CM

## 2020-11-05 DIAGNOSIS — F41.1 GAD (GENERALIZED ANXIETY DISORDER): ICD-10-CM

## 2020-11-05 DIAGNOSIS — M19.041 OSTEOARTHRITIS OF FINGERS OF HANDS, BILATERAL: ICD-10-CM

## 2020-11-05 DIAGNOSIS — M17.0 PRIMARY OSTEOARTHRITIS OF KNEES, BILATERAL: Primary | ICD-10-CM

## 2020-11-05 DIAGNOSIS — M50.20 HNP (HERNIATED NUCLEUS PULPOSUS), CERVICAL: ICD-10-CM

## 2020-11-05 PROCEDURE — 99213 OFFICE O/P EST LOW 20 MIN: CPT | Performed by: PHYSICAL MEDICINE & REHABILITATION

## 2020-11-05 NOTE — PATIENT INSTRUCTIONS
-Continue contrast water baths  -Continue paraffin baths  -Voltaren gel 3x daily  -Gabapentin to be continued  -Continue splinting as much as tolerated  -Consider injection if pain is more severe

## 2020-11-05 NOTE — PROGRESS NOTES
[address]  FOLLOW-UP EVALUATION    Chief Complaint: Neck and bilateral arm pain. HISTORY OF PRESENT ILLNESS:   Patient presents with:  Neck Pain: Currently in PT, helping a lot.  Taking Gabapentin 300mg B standing and ambulation and increased activity. Pain is rated 4 out of 10, worse with any increased activity such as going up and down the stairs. If she waits too long to get the Synvisc injection, occasionally she will feel knee giving away sensation. joint injections with corticosteroid. This provided great relief for the last several months however her pain is now returned.   She is now feeling pain in bilateral thumbs as well as the index fingers near the MCP joints of the index fingers with some swe would like to be in this pain is interfering with her daily activities. She feels some weakness with  strength as well and feels like she is dropping things from her hands as result of this pain.   She denies any elbow shoulder or neck complaints other treatment for her symptoms and is not taking any medications. She has not had any imaging either.       PAST MEDICAL HISTORY:     Past Medical History:   Diagnosis Date   • Allergic rhinitis    • Anesthesia complication     blood pressure bottomed out - 6- (four) times daily as needed. 1 Tube 3   • Probiotic Product (ALIGN OR) Take by mouth. • Fexofenadine HCl (MUCINEX ALLERGY OR) Take by mouth. • PATANOL 0.1 % Ophthalmic Solution Place 1 drop into both eyes daily as needed.              ALLERGIES: guarding  Extremities: No lower extremity edema bilaterally   Skin: No lesions noted   Cognition: alert & oriented x 3, attentive, able to follow 2 step commands, comprehention intact, spontaneous speech intact  Psychiatric: Mood and affect appropriate 0.5 12/30/2019    TP 7.6 12/30/2019    ALB 4.0 12/30/2019    GLOBULIN 3.6 12/30/2019    AGRATIO 1.5 06/13/2016     12/30/2019    K 4.2 12/30/2019     12/30/2019    CO2 30.0 12/30/2019     No results found for: PTP, PT, INR  Lab Results   Compon

## 2020-11-10 ENCOUNTER — OFFICE VISIT (OUTPATIENT)
Dept: PHYSICAL THERAPY | Age: 60
End: 2020-11-10
Attending: PHYSICAL MEDICINE & REHABILITATION
Payer: MEDICAID

## 2020-11-10 PROCEDURE — 97110 THERAPEUTIC EXERCISES: CPT

## 2020-11-10 PROCEDURE — 97140 MANUAL THERAPY 1/> REGIONS: CPT

## 2020-11-10 NOTE — PROGRESS NOTES
Dx: HNP (herniated nucleus pulposus), cervical (M50.20)  Cervical radiculopathy (M54.12)  Spinal stenosis in cervical region (M48.02)         Insurance (Authorized # of Visits):  16 visits; cert ends 3/0/8929           Authorizing Physician: Dr. Goran Diamond improve with cervical retraction with extension. She was advised to perform repeated movements every 3 hours to work towards abolishing pain. Added hand/wrist exercises with emphasis on good motor control and avoiding shearing at [facility] joint.       Goals: (to thoracic ext 10x1  Sitting: cervical rotation with eye tracking: 10x1  Standing: scap rows red tband 10x2  Standing: shoulder ext tband 10x2  Shoulder ER: 10x2 red tband 10x2   Therapeutic Exercise  Cervical retraction with extension in sitting: 10x1  Sitt retraction; shoulder flex; UT shoulder shrug; lower trap Y's; cervical retraction with ext; thoracic ext in sitting; scap retraction/shoulder ext/shoulder ER with tband; Hand/wrist AROM    Charges: 2 TE, 1 MT       Total Timed Treatment: 45 min  Total Jacki

## 2020-11-12 ENCOUNTER — LAB ENCOUNTER (OUTPATIENT)
Dept: LAB | Age: 60
End: 2020-11-12
Attending: INTERNAL MEDICINE
Payer: MEDICAID

## 2020-11-12 DIAGNOSIS — Z00.00 ANNUAL PHYSICAL EXAM: ICD-10-CM

## 2020-11-12 PROCEDURE — 80061 LIPID PANEL: CPT

## 2020-11-12 PROCEDURE — 36415 COLL VENOUS BLD VENIPUNCTURE: CPT

## 2020-11-19 ENCOUNTER — APPOINTMENT (OUTPATIENT)
Dept: PHYSICAL THERAPY | Age: 60
End: 2020-11-19
Attending: PHYSICAL MEDICINE & REHABILITATION
Payer: MEDICAID

## 2020-12-21 ENCOUNTER — OFFICE VISIT (OUTPATIENT)
Dept: PHYSICAL THERAPY | Age: 60
End: 2020-12-21
Attending: PHYSICAL MEDICINE & REHABILITATION
Payer: MEDICAID

## 2020-12-21 PROCEDURE — 97110 THERAPEUTIC EXERCISES: CPT

## 2020-12-21 PROCEDURE — 97140 MANUAL THERAPY 1/> REGIONS: CPT

## 2020-12-21 RX ORDER — ALPRAZOLAM 0.5 MG/1
0.5 TABLET ORAL 2 TIMES DAILY PRN
Qty: 60 TABLET | Refills: 0 | Status: SHIPPED | OUTPATIENT
Start: 2020-12-21 | End: 2021-01-25

## 2020-12-21 NOTE — PROGRESS NOTES
Dx: HNP (herniated nucleus pulposus), cervical (M50.20)  Cervical radiculopathy (M54.12)  Spinal stenosis in cervical region (M48.02)         Insurance (Authorized # of Visits):  16 visits; cert ends 6/7/5719           Authorizing Physician: Dr. Stevie Waterman (C7): R 4/5, L 5/5  Digit Flex (C8): R 4/5*, L 4/5*  Thumb Ext (C8): R 4/5*, L 4/5*  Interossei (T1): R 4/5,* L 4/5*     Rhomboids: R 4/5, L 4/5  Mid trap: R 3 -/5; L 4/5       Assessment: Worked on manual therapy and exercise to improve cervical and thora shoulder shrugs: 10x5\", 2 sets  Finger and exercises AROM in all planes x 8 min   Manual Therapy  Supine: suboccipital release; cervical distraction; CT junction mob  Supine: sideglide mobs at mid cervical  Supine: MFR at scalenes x 4 min  Supine: PROM at

## 2020-12-22 ENCOUNTER — TELEPHONE (OUTPATIENT)
Dept: PHYSICAL THERAPY | Facility: HOSPITAL | Age: 60
End: 2020-12-22

## 2020-12-22 RX ORDER — GABAPENTIN 300 MG/1
300 CAPSULE ORAL 3 TIMES DAILY
Qty: 90 CAPSULE | Refills: 0 | Status: SHIPPED | OUTPATIENT
Start: 2020-12-22 | End: 2021-02-10

## 2020-12-22 NOTE — TELEPHONE ENCOUNTER
Medication request: Gabapentin 300 MG    Take 1 capsule (300 mg total) by mouth 3 (three) times daily. - Oral      LOV: 11/05/2020  NOV: N/A    [facility]/Last refill: 10/1/2020 #90 r-0

## 2020-12-30 ENCOUNTER — PATIENT MESSAGE (OUTPATIENT)
Dept: INTERNAL MEDICINE CLINIC | Facility: CLINIC | Age: 60
End: 2020-12-30

## 2020-12-30 DIAGNOSIS — M79.642 BILATERAL HAND PAIN: Primary | ICD-10-CM

## 2020-12-30 DIAGNOSIS — M79.641 BILATERAL HAND PAIN: Primary | ICD-10-CM

## 2020-12-30 NOTE — TELEPHONE ENCOUNTER
From: Alexandra Gaona  To: Nadia Davis MD  Sent: 12/30/2020 10:40 AM CST  Subject: Referral Request    Good morning Dr. Javad Barros.  I was hoping to get a referral for a hand specialist. I have chronic pain in my hands for at least a year now.

## 2021-01-11 ENCOUNTER — TELEPHONE (OUTPATIENT)
Dept: SURGERY | Facility: CLINIC | Age: 61
End: 2021-01-11

## 2021-01-11 ENCOUNTER — OFFICE VISIT (OUTPATIENT)
Dept: PHYSICAL THERAPY | Age: 61
End: 2021-01-11
Attending: PHYSICAL MEDICINE & REHABILITATION
Payer: MEDICAID

## 2021-01-11 PROCEDURE — 97110 THERAPEUTIC EXERCISES: CPT

## 2021-01-11 PROCEDURE — 97140 MANUAL THERAPY 1/> REGIONS: CPT

## 2021-01-11 NOTE — TELEPHONE ENCOUNTER
Left voice message for pt to please call the office and request to speak to a RN.   Per Dr Bridget Watson plan to tell pt Dr Bridget Watson reviewed her chart and is recommending a steroid injection also as Dr Jorge Rosa had previously and 1/11/2021 appointment should b

## 2021-01-11 NOTE — TELEPHONE ENCOUNTER
Spoke to pt. Patient instructed per Dr. Minoo Augustine evaluation he recommends to receive a steroid injection with Dr. Alonso Ansari and no further treatment needs to be done with Dr. Ila Curran, and we will cancel her appointment.    Patient verbalized understand

## 2021-01-11 NOTE — PROGRESS NOTES
Dx: HNP (herniated nucleus pulposus), cervical (M50.20)  Cervical radiculopathy (M54.12)  Spinal stenosis in cervical region (M48.02)         Insurance (Authorized # of Visits):  16 visits; cert ends 9/7/9551           Authorizing Physician: Dr. Khoi Mckoy 4/5*  Interossei (T1): R 4/5,* L 4/5*     Rhomboids: R 4/5, L 4/5  Mid trap: R 3 -/5; L 4/5 Shoulder Flex: R 4/5, L 5/5  Shoulder ABD (C5): R 4+/5, L 5/5  Biceps (C6): R 5/5, L 5/5  Wrist ext (C6): R 5/5, L 5/5  Triceps (C7): R 5/5, L 5/5  Wrist Flex (C7): 10x1  Sitting: thoracic extension 10x1  Cervical rot AROM with eye tracking: 10x2  Wrist AROM for radial dev, flex, & ext: 10x each  Hand opening/closing emphasis on lumbrical strengthening:10x Therapeutic Exercise  Cervical retraction 10x3  Scapular retra

## 2021-01-25 ENCOUNTER — OFFICE VISIT (OUTPATIENT)
Dept: PHYSICAL THERAPY | Age: 61
End: 2021-01-25
Attending: PHYSICAL MEDICINE & REHABILITATION
Payer: MEDICAID

## 2021-01-25 PROCEDURE — 97110 THERAPEUTIC EXERCISES: CPT

## 2021-01-25 PROCEDURE — 97140 MANUAL THERAPY 1/> REGIONS: CPT

## 2021-01-25 RX ORDER — ALPRAZOLAM 0.5 MG/1
0.5 TABLET ORAL 2 TIMES DAILY PRN
Qty: 60 TABLET | Refills: 0 | Status: SHIPPED | OUTPATIENT
Start: 2021-01-25 | End: 2021-03-02

## 2021-01-25 NOTE — PROGRESS NOTES
Dx: HNP (herniated nucleus pulposus), cervical (M50.20)  Cervical radiculopathy (M54.12)  Spinal stenosis in cervical region (M48.02)         Insurance (Authorized # of Visits):  16 visits; cert ends 4/5/7127           Authorizing Physician: Dr. Fabiana Johnston visits since Sept 2020 to address her cervical pain, particularly at R side.  She reports feeling 70% better, but continues to have pain with lifting heavy weight such as a full laundry basket or with pushing/pulling activities such as closing/opening a doo [phone]. I certify the need for these services furnished under this plan of treatment and while under my care.     X___________________________________________________ Date____________________    Certification From: 9/38/8646  To:4/25/2021     Date: Therapy  Supine: suboccipital release; cervical distraction; CT junction mob  Supine: sideglide mobs at mid cervical  Supine: MFR at scalenes x 4 min               HEP: sitting cervical retraction ; scap retraction; shoulder flex; UT shoulder shrug; lower

## 2021-02-04 NOTE — TELEPHONE ENCOUNTER
Medication request: Gabapentin 300 MG    Take 1 capsule (300 mg total) by mouth 3 (three) times daily       LOV: 11/05/2020  NOV: 2/9/2021    [place] 12/22/2020 #90 r-0

## 2021-02-05 RX ORDER — GABAPENTIN 300 MG/1
300 CAPSULE ORAL 3 TIMES DAILY
Qty: 90 CAPSULE | Refills: 0 | OUTPATIENT
Start: 2021-02-05

## 2021-02-09 ENCOUNTER — OFFICE VISIT (OUTPATIENT)
Dept: NEUROLOGY | Facility: CLINIC | Age: 61
End: 2021-02-09
Payer: MEDICAID

## 2021-02-09 ENCOUNTER — TELEPHONE (OUTPATIENT)
Dept: NEUROLOGY | Facility: CLINIC | Age: 61
End: 2021-02-09

## 2021-02-09 VITALS
BODY MASS INDEX: 26.68 KG/M2 | WEIGHT: 170 LBS | HEIGHT: 67 IN | OXYGEN SATURATION: 93 % | HEART RATE: 86 BPM | SYSTOLIC BLOOD PRESSURE: 140 MMHG | DIASTOLIC BLOOD PRESSURE: 74 MMHG

## 2021-02-09 DIAGNOSIS — C50.212 MALIGNANT NEOPLASM OF UPPER-INNER QUADRANT OF LEFT BREAST IN FEMALE, ESTROGEN RECEPTOR POSITIVE (HCC): ICD-10-CM

## 2021-02-09 DIAGNOSIS — M18.0 OSTEOARTHRITIS OF CARPOMETACARPAL (CMC) JOINTS OF BOTH THUMBS, UNSPECIFIED OSTEOARTHRITIS TYPE: Primary | ICD-10-CM

## 2021-02-09 DIAGNOSIS — M50.20 HNP (HERNIATED NUCLEUS PULPOSUS), CERVICAL: ICD-10-CM

## 2021-02-09 DIAGNOSIS — Z17.0 MALIGNANT NEOPLASM OF UPPER-INNER QUADRANT OF LEFT BREAST IN FEMALE, ESTROGEN RECEPTOR POSITIVE (HCC): ICD-10-CM

## 2021-02-09 DIAGNOSIS — F41.1 GAD (GENERALIZED ANXIETY DISORDER): ICD-10-CM

## 2021-02-09 PROCEDURE — 99214 OFFICE O/P EST MOD 30 MIN: CPT | Performed by: PHYSICAL MEDICINE & REHABILITATION

## 2021-02-09 PROCEDURE — 3008F BODY MASS INDEX DOCD: CPT | Performed by: PHYSICAL MEDICINE & REHABILITATION

## 2021-02-09 PROCEDURE — 3078F DIAST BP <80 MM HG: CPT | Performed by: PHYSICAL MEDICINE & REHABILITATION

## 2021-02-09 PROCEDURE — 3077F SYST BP >= 140 MM HG: CPT | Performed by: PHYSICAL MEDICINE & REHABILITATION

## 2021-02-09 NOTE — TELEPHONE ENCOUNTER
Contacted Medicaid Eligibility automated line. Coverage is active until 2/28/2021-which is done on a month to month basis. Per Medicaid Guidelines-No authorization is required as long as coverage is active.     Ultrasound guided bilateral CMC joint inje

## 2021-02-10 RX ORDER — GABAPENTIN 300 MG/1
300 CAPSULE ORAL 3 TIMES DAILY
Qty: 90 CAPSULE | Refills: 0 | Status: SHIPPED | OUTPATIENT
Start: 2021-02-10 | End: 2021-05-05

## 2021-02-10 NOTE — TELEPHONE ENCOUNTER
Medication request: Gabapentin 800mg Take 1 capsule (300 mg total) by mouth 3 (three) times daily     LOV: 02/09/21   NOV: none    [place]: 12/22/20 #90 r-0

## 2021-02-10 NOTE — PROGRESS NOTES
[address]  FOLLOW-UP EVALUATION    Chief Complaint: Neck and bilateral arm pain. HISTORY OF PRESENT ILLNESS:   Patient presents with:  Hand Pain: lov:11/05/2020 Bilateral thumb pain.  Patientstates that c tingling in the fingers, any new weakness still notices some pain related weakness. She continues to use topical Voltaren gel and is using paraffin baths as well. Pain in the hands is moderate in nature rated 5 out of 10 today.     8/20/20  Patient is her tingling is constant in the fingers though she has worsening pain with mobility of the cervical spine. She has not been taking any medication specifically for this pain.   She has had physical therapy in the past for the neck discomfort which did seem to i been wearing the braces and using topical Voltaren gel as well as icing but has not noted significant improvement. She feels like the Voltaren gel is very helpful when her pain is very severe and flared up however this only helps temporarily.   Pain is wor lose weight. She denies any numbness tingling in particular in the hands but does have some of the symptoms in the arms.   Patient additionally has neck pain and has had EMG testing with me in the past which is notable for an acute left C8 radiculopathy bu Outpatient Medications   Medication Sig Dispense Refill   • ALPRAZolam 0.5 MG Oral Tab Take 1 tablet (0.5 mg total) by mouth 2 (two) times daily as needed for Anxiety.  60 tablet 0   • GABAPENTIN 300 MG Oral Cap Take 1 capsule (300 mg total) by mouth 3 (thr Genitourinary  Difficulty Urinating: denies  Bladder Incontinence: denies   Musculoskeletal  Joint Stiffness: admits  Painful Joints: admits   Neurological  Loss of Strength Since last Visit: denies  Tingling/Numbness: admits       PHYSICAL EXAM:   BP 14 BUNCREA 12.7 12/30/2019    CREATSERUM 1.18 (H) 12/30/2019    ANIONGAP 4 12/30/2019    GFRNAA 50 (L) 12/30/2019    GFRAA 58 (L) 12/30/2019    CA 9.0 12/30/2019    OSMOCALC 294 12/30/2019    ALKPHO 75 12/30/2019    AST 19 12/30/2019    ALT 36 12/30/2019    A FAAPMR & CAQSM  Physical Medicine and Rehabilitation/Sports Medicine  [facility]

## 2021-02-11 NOTE — TELEPHONE ENCOUNTER
S/W patient she has been scheduled for  Ultrasound guided bilateral CMC joint injection with corticosteroid.

## 2021-03-02 RX ORDER — ALPRAZOLAM 0.5 MG/1
0.5 TABLET ORAL 2 TIMES DAILY PRN
Qty: 60 TABLET | Refills: 0 | Status: SHIPPED | OUTPATIENT
Start: 2021-03-02 | End: 2021-04-26

## 2021-04-08 RX ORDER — VENLAFAXINE HYDROCHLORIDE 75 MG/1
CAPSULE, EXTENDED RELEASE ORAL
Qty: 90 CAPSULE | Refills: 0 | Status: SHIPPED | OUTPATIENT
Start: 2021-04-08 | End: 2021-07-13

## 2021-04-26 RX ORDER — ALPRAZOLAM 0.5 MG/1
0.5 TABLET ORAL 2 TIMES DAILY PRN
Qty: 60 TABLET | Refills: 0 | Status: SHIPPED | OUTPATIENT
Start: 2021-04-26 | End: 2021-06-10

## 2021-05-05 RX ORDER — GABAPENTIN 300 MG/1
300 CAPSULE ORAL 3 TIMES DAILY
Qty: 90 CAPSULE | Refills: 0 | Status: SHIPPED | OUTPATIENT
Start: 2021-05-05 | End: 2021-09-15

## 2021-05-05 NOTE — TELEPHONE ENCOUNTER
Medication request: Gabapentin 300mg Take 1 capsule (300 mg total) by mouth 3 (three) times daily     LOV: 02/09/21  NOV: none    [place] 02/10/21 #90 r-0    Patient states she usually takes medication bid and sometimes TID.  She does not feel li

## 2021-05-14 ENCOUNTER — TELEPHONE (OUTPATIENT)
Dept: OBGYN CLINIC | Facility: CLINIC | Age: 61
End: 2021-05-14

## 2021-05-14 NOTE — TELEPHONE ENCOUNTER
Pt states that she had bright red spotting today after a BM. Pt states that the spotting was vaginal and then it stopped. Denies cramping. Pt states it made her nervous, because she had breast cancer two years ago, May 2019.   Pt had treatment for her anselmo

## 2021-05-15 ENCOUNTER — HOSPITAL ENCOUNTER (OUTPATIENT)
Age: 61
Discharge: HOME | End: 2021-05-15
Attending: PHYSICIAN ASSISTANT
Payer: MEDICAID

## 2021-05-15 ENCOUNTER — APPOINTMENT (OUTPATIENT)
Dept: CT IMAGING | Age: 61
End: 2021-05-15
Attending: PHYSICIAN ASSISTANT
Payer: MEDICAID

## 2021-05-15 VITALS
HEART RATE: 96 BPM | DIASTOLIC BLOOD PRESSURE: 69 MMHG | SYSTOLIC BLOOD PRESSURE: 137 MMHG | WEIGHT: 170 LBS | HEIGHT: 68 IN | OXYGEN SATURATION: 98 % | BODY MASS INDEX: 25.76 KG/M2 | RESPIRATION RATE: 16 BRPM | TEMPERATURE: 98 F

## 2021-05-15 DIAGNOSIS — M54.50 ACUTE RIGHT-SIDED LOW BACK PAIN WITHOUT SCIATICA: Primary | ICD-10-CM

## 2021-05-15 DIAGNOSIS — R31.9 HEMATURIA, UNSPECIFIED TYPE: ICD-10-CM

## 2021-05-15 PROCEDURE — 74176 CT ABD & PELVIS W/O CONTRAST: CPT | Performed by: PHYSICIAN ASSISTANT

## 2021-05-15 PROCEDURE — 99213 OFFICE O/P EST LOW 20 MIN: CPT | Performed by: PHYSICIAN ASSISTANT

## 2021-05-15 PROCEDURE — 85025 COMPLETE CBC W/AUTO DIFF WBC: CPT | Performed by: PHYSICIAN ASSISTANT

## 2021-05-15 PROCEDURE — 80047 BASIC METABLC PNL IONIZED CA: CPT | Performed by: PHYSICIAN ASSISTANT

## 2021-05-15 PROCEDURE — 81002 URINALYSIS NONAUTO W/O SCOPE: CPT | Performed by: PHYSICIAN ASSISTANT

## 2021-05-15 PROCEDURE — 36415 COLL VENOUS BLD VENIPUNCTURE: CPT | Performed by: PHYSICIAN ASSISTANT

## 2021-05-15 RX ORDER — NAPROXEN 500 MG/1
500 TABLET ORAL 2 TIMES DAILY PRN
Qty: 10 TABLET | Refills: 0 | Status: SHIPPED | OUTPATIENT
Start: 2021-05-15 | End: 2021-05-20

## 2021-05-15 RX ORDER — LIDOCAINE 50 MG/G
1 PATCH TOPICAL EVERY 24 HOURS
Qty: 5 PATCH | Refills: 0 | Status: SHIPPED | OUTPATIENT
Start: 2021-05-15 | End: 2021-05-20

## 2021-05-15 RX ORDER — CYCLOBENZAPRINE HCL 10 MG
10 TABLET ORAL 3 TIMES DAILY PRN
Qty: 14 TABLET | Refills: 0 | Status: SHIPPED | OUTPATIENT
Start: 2021-05-15 | End: 2021-05-22

## 2021-05-15 NOTE — ED PROVIDER NOTES
Patient Seen in: [facility]    History   Patient presents with:  Back Pain    Stated Complaint: lower back pain right side    HPI    Brisa Burns is a 63year old female who presents with chief complaint of right low back pain.   Onset 5 d tablet (500 mg total) by mouth 2 (two) times daily as needed. lidocaine 5 % External Patch,  Place 1 patch onto the skin daily for 5 days.    GABAPENTIN 300 MG Oral Cap,  Take 1 capsule (300 mg total) by mouth 3 (three) times daily     ALPRAZolam 0.5 MG O Resp 16   Ht 172.7 cm (5' 8\")   Wt 77.1 kg   SpO2 98%   BMI 25.85 kg/m²     PULSE OX within normal limits on room air as interpreted by this provider. Constitutional: The patient is cooperative. Appears well-developed and well-nourished.   Mild disco following components:    ISTAT Glucose 122 (*)     ISTAT Creatinine 1.10 (*)     GFR, Non- 54 (*)     All other components within normal limits   POCT CBC   URINE CULTURE, ROUTINE       MDM     Radiology findings:     CT ABDOMEN+PELVIS [facility] gallbladder is contracted. Ginny Dragon is no biliary ductal dilatation.    PANCREAS: Negative unenhanced appearance for fluid collection, ductal dilatation, or atrophy.     SPLEEN: No enlargement.     ADRENALS:   No defined mass or abnormal enlargement.     IRA type    Disposition:  Discharge    Follow-up:  Randy Jang MD  [facility]  [phone]    Schedule an appointment as soon as possible for a visit in 2 days  For follow-up      Medications Prescribed:  Barron Nazario

## 2021-05-15 NOTE — ED QUICK NOTES
Pt sent to [facility] [address] [facility] for imaging studies. Provided with directions and verbalized understanding to remain npo until instructed further.

## 2021-05-15 NOTE — ED INITIAL ASSESSMENT (HPI)
Pt presents to the IC with c/o lower right side back pain over the last few days. Reports hematuria a couple days ago. No vomiting or diarrhea. No abd pain. +frequency with urination.

## 2021-05-17 NOTE — TELEPHONE ENCOUNTER
If she is certain that the bleeding was vaginal then please offer her an appt at then end of my schedule at [place] at 4pm where there is an opening.

## 2021-05-28 ENCOUNTER — TELEPHONE (OUTPATIENT)
Dept: INTERNAL MEDICINE CLINIC | Facility: CLINIC | Age: 61
End: 2021-05-28

## 2021-05-28 NOTE — TELEPHONE ENCOUNTER
Left message to call back and schedule appt as per Dr Dhillon Cancer response below. CSS, upon call back please assist in scheduling office visit.

## 2021-05-28 NOTE — TELEPHONE ENCOUNTER
Tia Beltre pt called and she stated she went to [facility] on 5/15 and was given medication for her back lower right side pain. Pt stated that she feels better but still feels a \"nagging\". She can still feel slight discomfort.  Pt feels she might needs some P

## 2021-06-04 ENCOUNTER — OFFICE VISIT (OUTPATIENT)
Dept: INTERNAL MEDICINE CLINIC | Facility: CLINIC | Age: 61
End: 2021-06-04
Payer: MEDICAID

## 2021-06-04 VITALS
HEIGHT: 68 IN | TEMPERATURE: 97 F | SYSTOLIC BLOOD PRESSURE: 120 MMHG | BODY MASS INDEX: 26.83 KG/M2 | RESPIRATION RATE: 12 BRPM | WEIGHT: 177 LBS | DIASTOLIC BLOOD PRESSURE: 76 MMHG | HEART RATE: 90 BPM

## 2021-06-04 DIAGNOSIS — R31.9 HEMATURIA, UNSPECIFIED TYPE: ICD-10-CM

## 2021-06-04 DIAGNOSIS — M54.50 ACUTE RIGHT-SIDED LOW BACK PAIN WITHOUT SCIATICA: Primary | ICD-10-CM

## 2021-06-04 PROCEDURE — 99214 OFFICE O/P EST MOD 30 MIN: CPT | Performed by: INTERNAL MEDICINE

## 2021-06-04 PROCEDURE — 3008F BODY MASS INDEX DOCD: CPT | Performed by: INTERNAL MEDICINE

## 2021-06-04 PROCEDURE — 3074F SYST BP LT 130 MM HG: CPT | Performed by: INTERNAL MEDICINE

## 2021-06-04 PROCEDURE — 3078F DIAST BP <80 MM HG: CPT | Performed by: INTERNAL MEDICINE

## 2021-06-04 NOTE — PROGRESS NOTES
HPI/Subjective:     Patient ID: Radha Krishnamurthy is a 63year old female. Low Back Pain  This is a new problem. The current episode started 1 to 4 weeks ago (3 weeks ago). The problem occurs constantly.  The problem has been waxing and waning since ons mouth daily. • fluconazole (DIFLUCAN) 150 MG Oral Tab Take one tablet po 1 tablet 0   • Probiotic Product (ALIGN OR) Take by mouth. • Fexofenadine HCl (MUCINEX ALLERGY OR) Take by mouth.      • PATANOL 0.1 % Ophthalmic Solution Place 1 drop into bot Family History   Adopted: Yes   Family history unknown: Yes      Social History: Social History    Tobacco Use      Smoking status: Former Smoker        Packs/day: 0.20        Years: 18.00        Pack years: 3.6        Types: Cigarettes        Quit date: Mental Status: She is alert. Assessment & Plan:   (M54.5) Acute right-sided low back pain without sciatica  (primary encounter diagnosis)  Plan: PHYSICAL THERAPY - INTERNAL        Neurologic exam was normal.  Her low back pain is improving.   He act

## 2021-06-10 RX ORDER — ALPRAZOLAM 0.5 MG/1
0.5 TABLET ORAL 2 TIMES DAILY PRN
Qty: 60 TABLET | Refills: 0 | Status: SHIPPED | OUTPATIENT
Start: 2021-06-10 | End: 2021-08-10

## 2021-06-10 RX ORDER — OLOPATADINE HYDROCHLORIDE 1 MG/ML
1 SOLUTION/ DROPS OPHTHALMIC
Qty: 5 ML | Refills: 0 | Status: SHIPPED | OUTPATIENT
Start: 2021-06-10

## 2021-06-10 NOTE — TELEPHONE ENCOUNTER
Patient indicates she is waiting for script for her allergy eye drops, please send [employer] message to update, thanks.

## 2021-06-11 ENCOUNTER — TELEPHONE (OUTPATIENT)
Dept: INTERNAL MEDICINE CLINIC | Facility: CLINIC | Age: 61
End: 2021-06-11

## 2021-06-11 NOTE — TELEPHONE ENCOUNTER
Per pharmacy, PA needed or the followimg medication:    •  Olopatadine HCl (PATANOL) 0.1 % Ophthalmic Solution, Place 1 drop into both eyes daily as needed. , Disp: 5 mL, Rfl: 0

## 2021-06-11 NOTE — TELEPHONE ENCOUNTER
Patient called back, provided her with the message below. Patient verbalized understanding no further questions.

## 2021-06-28 ENCOUNTER — OFFICE VISIT (OUTPATIENT)
Dept: SURGERY | Facility: CLINIC | Age: 61
End: 2021-06-28
Payer: MEDICAID

## 2021-06-28 VITALS — SYSTOLIC BLOOD PRESSURE: 124 MMHG | DIASTOLIC BLOOD PRESSURE: 81 MMHG | HEART RATE: 80 BPM

## 2021-06-28 DIAGNOSIS — R31.0 GROSS HEMATURIA: Primary | ICD-10-CM

## 2021-06-28 PROCEDURE — 3079F DIAST BP 80-89 MM HG: CPT | Performed by: UROLOGY

## 2021-06-28 PROCEDURE — 99244 OFF/OP CNSLTJ NEW/EST MOD 40: CPT | Performed by: UROLOGY

## 2021-06-28 PROCEDURE — 3074F SYST BP LT 130 MM HG: CPT | Performed by: UROLOGY

## 2021-06-28 NOTE — PROGRESS NOTES
SUBJECTIVE:  Daren St is a 63year old female who presents for a consultation at the request of, and a copy of this note will be sent to, Dr. Lisa Awan, for evaluation of  hematuria. She states that the problem is resolved.  Symptoms in identified. There is no hydronephrosis or hydroureter.  No perinephric or periureteric fat stranding is appreciated.     GI/MESENTERY: Evaluation of the gastrointestinal tract is limited without enteric contrast.  The appendix is normal.  There is no bowel after surgery. Revived on own when staff moved pt.    • Anxiety    • Anxiety state    • Cancer [facility])     Breast Cancer DX 12/18   • Chronic rhinitis    • History of neuroma 04-26-11    \"Neuroma 3 rt\"   • Metatarsalgia 04-    \"modified insole / rx loss, fever, night sweats, bone pain, malaise and fatigue. Positive for:  None. All other ROS reviewed and otherwise normal.    OBJECTIVE:  /81 (BP Location: Left arm)   Pulse 80   She appears well, in no apparent distress.   Alert and oriented times

## 2021-07-06 ENCOUNTER — HOSPITAL ENCOUNTER (OUTPATIENT)
Dept: CT IMAGING | Facility: HOSPITAL | Age: 61
Discharge: HOME | End: 2021-07-06
Attending: UROLOGY
Payer: MEDICAID

## 2021-07-06 DIAGNOSIS — R31.0 GROSS HEMATURIA: ICD-10-CM

## 2021-07-06 DIAGNOSIS — M17.0 PRIMARY OSTEOARTHRITIS OF KNEES, BILATERAL: ICD-10-CM

## 2021-07-06 LAB — CREAT BLD-MCNC: 0.9 MG/DL

## 2021-07-06 PROCEDURE — 74178 CT ABD&PLV WO CNTR FLWD CNTR: CPT | Performed by: UROLOGY

## 2021-07-06 PROCEDURE — 82565 ASSAY OF CREATININE: CPT

## 2021-07-06 PROCEDURE — 76377 3D RENDER W/INTRP POSTPROCES: CPT | Performed by: UROLOGY

## 2021-07-13 RX ORDER — VENLAFAXINE HYDROCHLORIDE 75 MG/1
CAPSULE, EXTENDED RELEASE ORAL
Qty: 90 CAPSULE | Refills: 0 | Status: SHIPPED | OUTPATIENT
Start: 2021-07-13 | End: 2021-10-12

## 2021-07-19 ENCOUNTER — TELEPHONE (OUTPATIENT)
Dept: SURGERY | Facility: CLINIC | Age: 61
End: 2021-07-19

## 2021-07-19 NOTE — TELEPHONE ENCOUNTER
Called pt verified name and  I informed pt that we needed to move procedure down because Dr. Barbi Villalobos had to add on a surgery that morning. Pt informed me that she could not come in later that day and would need to reschedule her appointment.  I informe

## 2021-07-20 NOTE — TELEPHONE ENCOUNTER
Spoke with patient, assisted in rescheduling procedure. PT confirmed and verbalized understanding.      Future Appointments   Date Time Provider Laina Oglesby   7/30/2021 11:00 AM Clari Bonilla MD [facility]   10/12/2021  9:40 AM Aldair Chiang List,

## 2021-07-30 ENCOUNTER — PROCEDURE (OUTPATIENT)
Dept: SURGERY | Facility: CLINIC | Age: 61
End: 2021-07-30
Payer: MEDICAID

## 2021-07-30 VITALS
SYSTOLIC BLOOD PRESSURE: 132 MMHG | WEIGHT: 177 LBS | HEART RATE: 88 BPM | BODY MASS INDEX: 27 KG/M2 | DIASTOLIC BLOOD PRESSURE: 68 MMHG

## 2021-07-30 DIAGNOSIS — R31.0 GROSS HEMATURIA: Primary | ICD-10-CM

## 2021-07-30 PROCEDURE — 3078F DIAST BP <80 MM HG: CPT | Performed by: UROLOGY

## 2021-07-30 PROCEDURE — 52000 CYSTOURETHROSCOPY: CPT | Performed by: UROLOGY

## 2021-07-30 PROCEDURE — 3075F SYST BP GE 130 - 139MM HG: CPT | Performed by: UROLOGY

## 2021-07-30 RX ORDER — CIPROFLOXACIN 500 MG/1
500 TABLET, FILM COATED ORAL ONCE
Status: COMPLETED | OUTPATIENT
Start: 2021-07-30 | End: 2021-07-30

## 2021-07-30 RX ADMIN — CIPROFLOXACIN 500 MG: 500 TABLET, FILM COATED ORAL at 11:53:00

## 2021-07-30 NOTE — PROGRESS NOTES
Fabio Adjutant is a 63year old female. HPI:   Patient presents with:  Hematuria: patient presents for cystoscopy      78-year-old female presents for office cystoscopy for evaluation of resolved gross painless hematuria.  Complains of right lower qu 3.6        Types: Cigarettes        Quit date: 1988        Years since quittin.6      Smokeless tobacco: Never Used      Tobacco comment: socially    Vaping Use      Vaping Use: Never used    Alcohol use:  Yes      Alcohol/week: 0.8 standard drinks stone, diverticulum, or glomerulation  U.O's: Normal  Trabeculation: none    Pelvic exam shows normal vagina and urethra without palpable masses or abnormalities.  No evidence of pelvic organ prolapse      POST CYSTOSCOPY MEDICATIONS: sample one tablet Cipr

## 2021-08-10 RX ORDER — ALPRAZOLAM 0.5 MG/1
0.5 TABLET ORAL 2 TIMES DAILY PRN
Qty: 60 TABLET | Refills: 1 | Status: SHIPPED | OUTPATIENT
Start: 2021-08-10 | End: 2021-11-21

## 2021-08-10 NOTE — TELEPHONE ENCOUNTER
Please review; protocol failed/no protocol.      Requested Prescriptions   Pending Prescriptions Disp Refills    ALPRAZOLAM 0.5 MG Oral Tab [Pharmacy Med Name: Alprazolam 0.5 Mg Tab Acta] 60 tablet 0     Sig: TAKE ONE TABLET BY MOUTH TWICE DAILY AS NEEDED for anxiety        There is no refill protocol information for this order            Recent Outpatient Visits              1 month ago Gross hematuria    [facility] Reny Fenton MD    Office Visit    2 months ago Acute right-sided low back pain without sciatica    [facility], [facility], [address], Bethanie Cabot, MD    Office Visit    6 months ago Osteoarthritis of carpometacarpal Centre) joints of both thumbs, unspecified osteoarthritis type    [facility], [address], [address] [place], [facility]     Office Visit    6 months ago     [address]    Office Visit    7 months ago     [address] [facility], [place],     Office Visit             Future Appointments         Provider Department Appt Notes    In 2 months Renetta Chiang MD [facility], [address], [place] physical - policy informed

## 2021-08-19 ENCOUNTER — MED REC SCAN ONLY (OUTPATIENT)
Dept: INTERNAL MEDICINE CLINIC | Facility: CLINIC | Age: 61
End: 2021-08-19

## 2021-08-23 ENCOUNTER — PATIENT MESSAGE (OUTPATIENT)
Dept: NEUROLOGY | Facility: CLINIC | Age: 61
End: 2021-08-23

## 2021-08-23 DIAGNOSIS — M17.11 OSTEOARTHRITIS OF RIGHT KNEE, UNSPECIFIED OSTEOARTHRITIS TYPE: Primary | ICD-10-CM

## 2021-08-24 NOTE — TELEPHONE ENCOUNTER
From: Gopal Mckeon  To: Leodan Mayers DO  Sent: 8/23/2021 5:43 PM CDT  Subject: Non-Urgent Medical Question    Hi Dr. Elsa Mayers. I would like to schedule another Synvisc injection in my right knee please.     Let me know what i need to do on my end-

## 2021-08-25 ENCOUNTER — TELEPHONE (OUTPATIENT)
Dept: NEUROLOGY | Facility: CLINIC | Age: 61
End: 2021-08-25

## 2021-08-25 DIAGNOSIS — M17.0 PRIMARY OSTEOARTHRITIS OF KNEES, BILATERAL: Primary | ICD-10-CM

## 2021-08-25 NOTE — TELEPHONE ENCOUNTER
Pt. is eligible for services Right knee aspiration and injection with Gel one cpt codes 88517, . through 08/31/21. Eligibility is verified on a month to month basis. Will inform Nursing.

## 2021-08-25 NOTE — TELEPHONE ENCOUNTER
Synvisc injection has been ordered for the patient. Please advise her that we will do a follow-up visit and will perform the injection if appropriate.     Lavern Crawford DO, FAAPMR & CAQSM  Physical Medicine and Rehabilitation/Sports Medicine  [facility]

## 2021-09-07 ENCOUNTER — OFFICE VISIT (OUTPATIENT)
Dept: PHYSICAL MEDICINE AND REHAB | Facility: CLINIC | Age: 61
End: 2021-09-07
Payer: MEDICAID

## 2021-09-07 ENCOUNTER — APPOINTMENT (OUTPATIENT)
Dept: LAB | Facility: HOSPITAL | Age: 61
End: 2021-09-07
Attending: PHYSICAL MEDICINE & REHABILITATION
Payer: MEDICAID

## 2021-09-07 DIAGNOSIS — Z17.0 MALIGNANT NEOPLASM OF UPPER-INNER QUADRANT OF LEFT BREAST IN FEMALE, ESTROGEN RECEPTOR POSITIVE (HCC): ICD-10-CM

## 2021-09-07 DIAGNOSIS — R20.0 NUMBNESS AND TINGLING IN BOTH HANDS: ICD-10-CM

## 2021-09-07 DIAGNOSIS — F41.1 GAD (GENERALIZED ANXIETY DISORDER): ICD-10-CM

## 2021-09-07 DIAGNOSIS — M17.0 PRIMARY OSTEOARTHRITIS OF KNEES, BILATERAL: Primary | ICD-10-CM

## 2021-09-07 DIAGNOSIS — M18.0 ARTHRITIS OF CARPOMETACARPAL (CMC) JOINT OF BOTH THUMBS: ICD-10-CM

## 2021-09-07 DIAGNOSIS — M50.20 HNP (HERNIATED NUCLEUS PULPOSUS), CERVICAL: ICD-10-CM

## 2021-09-07 DIAGNOSIS — R20.2 NUMBNESS AND TINGLING IN BOTH HANDS: ICD-10-CM

## 2021-09-07 DIAGNOSIS — C50.212 MALIGNANT NEOPLASM OF UPPER-INNER QUADRANT OF LEFT BREAST IN FEMALE, ESTROGEN RECEPTOR POSITIVE (HCC): ICD-10-CM

## 2021-09-07 PROCEDURE — 20610 DRAIN/INJ JOINT/BURSA W/O US: CPT | Performed by: PHYSICAL MEDICINE & REHABILITATION

## 2021-09-07 PROCEDURE — 89060 EXAM SYNOVIAL FLUID CRYSTALS: CPT

## 2021-09-07 NOTE — PROGRESS NOTES
[address]  FOLLOW-UP EVALUATION    Chief Complaint: Neck and bilateral arm pain. HISTORY OF PRESENT ILLNESS:   Patient presents with: Injection: Patient comes in for R knee aspiration and HA.  Rates pain physical therapy currently for neck pain. She notices improvement with this as well. However, she is starting to experience bilateral thumb pain along the [facility] again.   She recently raked leaves and had increased yard work and since has had flareup with her symptoms overall. She recently had MRI imaging of the cervical spine as noted below.   Patient continues to experience discomfort in the cervical spine which is rated 5-6 out of 10 with radiation in bilateral arms and hands specifically in the thu hands generally at nighttime. Her pain is severe, very tender to light touch and is affecting her function ability to sleep. She is having a hard time wearing the braces as result of this pain as well.   Pain is worse with any increased activity and impro this point is really helping. Pain today is more severe, but can be milder at times when not as active. She is endorsing some weakness as a result of this pain as well and feels like she has a hard time with  strength and holding onto objects.   She d WIRE 1 SITE RIGHT ([id number]=[id number])  2009   • RADIATION Bilateral 04/15/2019    recent radiation tx for breast cancer   • REDUCTION LEFT  05/2014   • REDUCTION OF LARGE BREAST Bilateral 2014   • REDUCTION RIGHT  05/2014         CURRENT MEDICATIONS:     Current O Alcohol use:  Yes      Alcohol/week: 0.8 standard drinks      Types: 1 Glasses of wine per week      Comment: weekends a few    Drug use: No         REVIEW OF SYSTEMS:   Patient-reported ROS  Constitutional  Sleep Disturbance: denies   Cardiovascular  Chest 33.2 05/15/2021    RDW 12.7 12/30/2019    .0 12/30/2019    MPV 9.4 09/22/2018     Lab Results   Component Value Date     (H) 12/30/2019    BUN 15 12/30/2019    BUNCREA 12.7 12/30/2019    CREATSERUM 1.18 (H) 12/30/2019    ANIONGAP 4 12/30/2019 osteoarthritis. She has pain in the thumb from arthritis as well and advised her to continue her home exercise program.  She will follow-up again in 4 weeks. Advised her to continue aqua therapy for her knee knees and hands.     Patient verbalized underst

## 2021-09-07 NOTE — PROCEDURES
Procedure:  Right knee aspiration and injection with Hyaluronic acid  The risks, benefits and anticipated outcomes of the procedure, the risks and benefits of the alternatives to the procedure, and the roles and tasks of the personnel to be involved, were

## 2021-09-07 NOTE — PATIENT INSTRUCTIONS
Hyaluronic Acid Injection  What to expect: The injection contains Lidocaine (which numbs the area) and hyaluronic acid. You may have pain relief within hours of the injection due to the Lidocaine.   The Hyaluronic Acid can take a couple days, up to a coupl

## 2021-09-07 NOTE — TELEPHONE ENCOUNTER
Inbasket message from EVE Kuo requesting eligibility. Called Medicaid to verify eligibility. Per automated telephone system, patient is eligible for services effective thru 9/30/21.   Will inform RONALDO Approved Referral.    Note: Eligibility needs to b

## 2021-09-08 ENCOUNTER — HOSPITAL ENCOUNTER (OUTPATIENT)
Dept: GENERAL RADIOLOGY | Age: 61
Discharge: HOME | End: 2021-09-08
Attending: PHYSICAL MEDICINE & REHABILITATION
Payer: MEDICAID

## 2021-09-08 ENCOUNTER — TELEPHONE (OUTPATIENT)
Dept: NEUROLOGY | Facility: CLINIC | Age: 61
End: 2021-09-08

## 2021-09-08 DIAGNOSIS — M17.0 PRIMARY OSTEOARTHRITIS OF KNEES, BILATERAL: ICD-10-CM

## 2021-09-08 PROCEDURE — 73564 X-RAY EXAM KNEE 4 OR MORE: CPT | Performed by: PHYSICAL MEDICINE & REHABILITATION

## 2021-09-08 RX ORDER — LIDOCAINE HYDROCHLORIDE 10 MG/ML
3 INJECTION, SOLUTION INFILTRATION; PERINEURAL ONCE
Status: COMPLETED | OUTPATIENT
Start: 2021-09-08 | End: 2021-09-08

## 2021-09-08 RX ADMIN — LIDOCAINE HYDROCHLORIDE 3 ML: 10 INJECTION, SOLUTION INFILTRATION; PERINEURAL at 13:25:00

## 2021-09-15 ENCOUNTER — OFFICE VISIT (OUTPATIENT)
Dept: INTERNAL MEDICINE CLINIC | Facility: CLINIC | Age: 61
End: 2021-09-15
Payer: MEDICAID

## 2021-09-15 VITALS
HEART RATE: 103 BPM | OXYGEN SATURATION: 98 % | SYSTOLIC BLOOD PRESSURE: 120 MMHG | DIASTOLIC BLOOD PRESSURE: 80 MMHG | WEIGHT: 177.31 LBS | TEMPERATURE: 98 F | HEIGHT: 68 IN | BODY MASS INDEX: 26.87 KG/M2

## 2021-09-15 DIAGNOSIS — Z17.0 MALIGNANT NEOPLASM OF UPPER-INNER QUADRANT OF LEFT BREAST IN FEMALE, ESTROGEN RECEPTOR POSITIVE (HCC): ICD-10-CM

## 2021-09-15 DIAGNOSIS — Z00.00 ANNUAL PHYSICAL EXAM: Primary | ICD-10-CM

## 2021-09-15 DIAGNOSIS — C50.212 MALIGNANT NEOPLASM OF UPPER-INNER QUADRANT OF LEFT BREAST IN FEMALE, ESTROGEN RECEPTOR POSITIVE (HCC): ICD-10-CM

## 2021-09-15 DIAGNOSIS — F41.9 ANXIETY AND DEPRESSION: ICD-10-CM

## 2021-09-15 DIAGNOSIS — F32.A ANXIETY AND DEPRESSION: ICD-10-CM

## 2021-09-15 DIAGNOSIS — M17.0 PRIMARY OSTEOARTHRITIS OF KNEES, BILATERAL: ICD-10-CM

## 2021-09-15 DIAGNOSIS — J30.9 ALLERGIC RHINITIS, UNSPECIFIED SEASONALITY, UNSPECIFIED TRIGGER: ICD-10-CM

## 2021-09-15 PROBLEM — N84.0 ENDOMETRIAL POLYP: Status: RESOLVED | Noted: 2017-12-04 | Resolved: 2021-09-15

## 2021-09-15 PROBLEM — R29.3 POOR POSTURE: Status: RESOLVED | Noted: 2019-10-01 | Resolved: 2021-09-15

## 2021-09-15 PROBLEM — N95.0 POSTMENOPAUSAL BLEEDING: Status: RESOLVED | Noted: 2017-12-04 | Resolved: 2021-09-15

## 2021-09-15 PROBLEM — R22.1 NECK MASS: Status: RESOLVED | Noted: 2019-07-15 | Resolved: 2021-09-15

## 2021-09-15 PROCEDURE — 3074F SYST BP LT 130 MM HG: CPT | Performed by: INTERNAL MEDICINE

## 2021-09-15 PROCEDURE — 3008F BODY MASS INDEX DOCD: CPT | Performed by: INTERNAL MEDICINE

## 2021-09-15 PROCEDURE — 99396 PREV VISIT EST AGE 40-64: CPT | Performed by: INTERNAL MEDICINE

## 2021-09-15 PROCEDURE — 3079F DIAST BP 80-89 MM HG: CPT | Performed by: INTERNAL MEDICINE

## 2021-09-15 RX ORDER — ANASTROZOLE 1 MG/1
1 TABLET ORAL DAILY
COMMUNITY
Start: 2021-09-09

## 2021-09-15 RX ORDER — CETIRIZINE HYDROCHLORIDE 10 MG/1
CAPSULE, LIQUID FILLED ORAL
COMMUNITY
Start: 2021-06-13

## 2021-09-15 NOTE — PROGRESS NOTES
Subjective:     Patient ID: Adebayo Kelley is a 57year old female. Patient presents today for her annual physical exam otherwise has no specific complaints. History/Other:   Review of Systems   Constitutional: Negative.     HENT: Positive for c 12/18   • Chronic rhinitis    • History of neuroma 04-26-11    \"Neuroma 3 rt\"   • Metatarsalgia 04-    \"modified insole / rx naprosyn\"   • PONV (postoperative nausea and vomiting)    • Right knee injury 1989    Arthroscopy   • Visual impairment Conjunctiva/sclera: Conjunctivae normal.      Pupils: Pupils are equal, round, and reactive to light. Neck:      Vascular: No carotid bruit or JVD. Cardiovascular:      Rate and Rhythm: Normal rate and regular rhythm. Pulses: Normal pulses. She remains on anastrozole for adjuvant hormonal therapy. She will be scheduled to get her mammogram by next month as ordered by her oncologist.    (F41.9,  F32.9) Anxiety and depression  Plan: Stable on current medication. She also see a counselor.   NATHALIA

## 2021-09-16 ENCOUNTER — OFFICE VISIT (OUTPATIENT)
Dept: PHYSICAL THERAPY | Age: 61
End: 2021-09-16
Attending: INTERNAL MEDICINE
Payer: MEDICAID

## 2021-09-16 DIAGNOSIS — M54.50 ACUTE RIGHT-SIDED LOW BACK PAIN WITHOUT SCIATICA: ICD-10-CM

## 2021-09-16 PROCEDURE — 97110 THERAPEUTIC EXERCISES: CPT

## 2021-09-16 PROCEDURE — 97530 THERAPEUTIC ACTIVITIES: CPT

## 2021-09-16 PROCEDURE — 97161 PT EVAL LOW COMPLEX 20 MIN: CPT

## 2021-09-16 NOTE — PATIENT INSTRUCTIONS
Access Code: [email or web address]  URL: [email or web address]  Date: 09/16/2021  Prepared by: Ramón Cook    Exercises  Standing Lumbar Extension - 4 x daily - 7 x weekly - 1 sets - 10 reps  Supine Posterior Pelvic Tilt - 2 x daily - 7 x weekly - 2 sets -

## 2021-09-23 ENCOUNTER — OFFICE VISIT (OUTPATIENT)
Dept: PHYSICAL THERAPY | Age: 61
End: 2021-09-23
Attending: INTERNAL MEDICINE
Payer: MEDICAID

## 2021-09-23 PROCEDURE — 97110 THERAPEUTIC EXERCISES: CPT

## 2021-09-23 NOTE — PROGRESS NOTES
Dx:     Acute right-sided low back pain without sciatica (M54.5)     Insurance (Authorized # of Visits):  Medicaid 8 visits cert ends 40/46/9219       Authorizing Physician: Dr. Jose Lunsford  Next MD visit: none scheduled  Fall Risk: standard         Precaut Total Timed Treatment: 40 min  Total Treatment Time: 40 min

## 2021-09-29 ENCOUNTER — OFFICE VISIT (OUTPATIENT)
Dept: PHYSICAL THERAPY | Age: 61
End: 2021-09-29
Attending: INTERNAL MEDICINE
Payer: MEDICAID

## 2021-09-29 PROCEDURE — 97110 THERAPEUTIC EXERCISES: CPT

## 2021-09-29 NOTE — PROGRESS NOTES
Dx:     Acute right-sided low back pain without sciatica (M54.5)     Insurance (Authorized # of Visits):  Medicaid 8 visits cert ends 80/20/4459       Authorizing Physician: Dr. Rsuty Kincaid MD visit: none scheduled  Fall Risk: standard         Precaut 10x2  Standing: heel raises 10x2  Standing: hip abd 10x2  Standing: pelvic tilts 10x2      Manual Therapy  B hip PROM Manual Therapy  B hip PROM                    HEP: pelvic tilt; BKFO; lateral sidebending stretch; standing lumbar ext; Bridging    Charge

## 2021-10-01 ENCOUNTER — APPOINTMENT (OUTPATIENT)
Dept: PHYSICAL THERAPY | Age: 61
End: 2021-10-01
Attending: INTERNAL MEDICINE
Payer: MEDICAID

## 2021-10-04 ENCOUNTER — OFFICE VISIT (OUTPATIENT)
Dept: PHYSICAL THERAPY | Age: 61
End: 2021-10-04
Attending: INTERNAL MEDICINE
Payer: MEDICAID

## 2021-10-04 PROCEDURE — 97110 THERAPEUTIC EXERCISES: CPT

## 2021-10-04 PROCEDURE — 97140 MANUAL THERAPY 1/> REGIONS: CPT

## 2021-10-04 NOTE — PROGRESS NOTES
Dx:     Acute right-sided low back pain without sciatica (M54.5)     Insurance (Authorized # of Visits):  Medicaid 8 visits cert ends 72/31/6566       Authorizing Physician: Dr. Allie Cheng  Next MD visit: none scheduled  Fall Risk: standard         Precaut with hip add at ball 10x2  Supine: hip abd at blue tband 10x2  Supine: hip SLR 10x2  Sidelying: hip clams 10x2  Sitting: LAQs 10x2  Standing: heel raises 10x2  Standing: hip abd 10x2  Standing: pelvic tilts 10x2 Therapeutic Exercise  Supine: LTR 10x2  Supi

## 2021-10-06 ENCOUNTER — LAB ENCOUNTER (OUTPATIENT)
Dept: LAB | Age: 61
End: 2021-10-06
Attending: INTERNAL MEDICINE
Payer: MEDICAID

## 2021-10-06 ENCOUNTER — OFFICE VISIT (OUTPATIENT)
Dept: PHYSICAL THERAPY | Age: 61
End: 2021-10-06
Attending: INTERNAL MEDICINE
Payer: MEDICAID

## 2021-10-06 DIAGNOSIS — Z00.00 ANNUAL PHYSICAL EXAM: ICD-10-CM

## 2021-10-06 PROCEDURE — 85025 COMPLETE CBC W/AUTO DIFF WBC: CPT

## 2021-10-06 PROCEDURE — 80053 COMPREHEN METABOLIC PANEL: CPT

## 2021-10-06 PROCEDURE — 80061 LIPID PANEL: CPT

## 2021-10-06 PROCEDURE — 97140 MANUAL THERAPY 1/> REGIONS: CPT

## 2021-10-06 PROCEDURE — 97110 THERAPEUTIC EXERCISES: CPT

## 2021-10-06 PROCEDURE — 36415 COLL VENOUS BLD VENIPUNCTURE: CPT

## 2021-10-06 NOTE — PROGRESS NOTES
Dx:     Acute right-sided low back pain without sciatica (M54.5)     Insurance (Authorized # of Visits):  Medicaid 8 visits cert ends 62/72/6094       Authorizing Physician: Dr. Jarrett Mccormack  Next MD visit: none scheduled  Fall Risk: standard         Precaut 10x2  Sitting: LAQs 10x2  Standing: heel raises 10x2  Standing: hip abd 10x2  Standing: pelvic tilts 10x2 Therapeutic Exercise  Supine: LTR 10x2  Supine: bridging 20x    Supine: hip SLR 10x2  Sidelying: hip clams 10x2  Sidelying: hip SLR 10x2  Standing: he

## 2021-10-11 ENCOUNTER — APPOINTMENT (OUTPATIENT)
Dept: PHYSICAL THERAPY | Age: 61
End: 2021-10-11
Attending: INTERNAL MEDICINE
Payer: MEDICAID

## 2021-10-12 ENCOUNTER — OFFICE VISIT (OUTPATIENT)
Dept: OBGYN CLINIC | Facility: CLINIC | Age: 61
End: 2021-10-12
Payer: MEDICAID

## 2021-10-12 VITALS
SYSTOLIC BLOOD PRESSURE: 130 MMHG | WEIGHT: 177 LBS | DIASTOLIC BLOOD PRESSURE: 84 MMHG | HEART RATE: 82 BPM | BODY MASS INDEX: 27 KG/M2

## 2021-10-12 DIAGNOSIS — Z01.419 ENCOUNTER FOR GYNECOLOGICAL EXAMINATION WITHOUT ABNORMAL FINDING: Primary | ICD-10-CM

## 2021-10-12 PROCEDURE — 99396 PREV VISIT EST AGE 40-64: CPT | Performed by: OBSTETRICS & GYNECOLOGY

## 2021-10-12 PROCEDURE — 3075F SYST BP GE 130 - 139MM HG: CPT | Performed by: OBSTETRICS & GYNECOLOGY

## 2021-10-12 PROCEDURE — 3079F DIAST BP 80-89 MM HG: CPT | Performed by: OBSTETRICS & GYNECOLOGY

## 2021-10-12 RX ORDER — VENLAFAXINE HYDROCHLORIDE 75 MG/1
75 CAPSULE, EXTENDED RELEASE ORAL DAILY
Qty: 90 CAPSULE | Refills: 1 | Status: SHIPPED | OUTPATIENT
Start: 2021-10-12 | End: 2022-05-13

## 2021-10-12 NOTE — TELEPHONE ENCOUNTER
Refill passed per [facility] protocol.      Requested Prescriptions   Pending Prescriptions Disp Refills    VENLAFAXINE 75 MG Oral Capsule SR 24 Hr [Pharmacy Med Name: Venlafaxine Hydrochloride Er 24hr 75 Mg Cap Auro] 90 capsule 0     Sig: TAKE ONE CAPSULE BY MOUTH ONE TIME DAILY        Psychiatric Non-Scheduled (Anti-Anxiety) Passed - 10/11/2021 10:11 PM        Passed - Appointment in last 6 or next 3 months                Recent Outpatient Visits              6 days ago     [address] [place] [address] Es[place]    Office Visit    1 week ago     [address] [place] [address] [place]    Office Visit    1 week ago     [address] [place] [address] [place]    Office Visit    2 weeks ago     [address] [place], [place] [address]    Office Visit    3 weeks ago Acute right-sided low back pain without sciatica    [address] [place] [address] Es[place]    Office Visit             Future Appointments         Provider Department Appt Notes    Today Page, Victorino Pace MD [facility], [address], [place] **physical - policy informed    Tomorrow Esmer Wick, PT [address]

## 2021-10-12 NOTE — PROGRESS NOTES
Dorie Frias is a 57year old female [id number] No LMP recorded. (Menstrual status: Menopause). who presents for Patient presents with:  Gyn Exam: ANNUAL  She has no gyne complaints.   She has mammogram at [facility] 10/14 and will see her onc/breast surgeon th Smokeless tobacco: Never Used      Tobacco comment: socially    Vaping Use      Vaping Use: Never used    Substance and Sexual Activity      Alcohol use:  Yes        Alcohol/week: 0.8 standard drinks        Types: 1 Glasses of wine per week        Comment: RASH      Review of Systems:  Constitutional:  Denies fatigue, night sweats, hot flashes  Eyes:  denies blurred or double vision  Cardiovascular:  denies chest pain or palpitations  Respiratory:  denies shortness of breath  Gastrointestinal:  denies heartb tenderness  Perineum: normal  Rectovaginal: no masses, normal tone  Anus: no thrombosed or ulcerated hemorroids    Assessment & Plan:   ASCCP guidelines discussed,cotest done [id number] in 3 - 5 years,mammogram ordered by breast onc,rtc 1 year for

## 2021-10-13 ENCOUNTER — OFFICE VISIT (OUTPATIENT)
Dept: PHYSICAL THERAPY | Age: 61
End: 2021-10-13
Attending: INTERNAL MEDICINE
Payer: MEDICAID

## 2021-10-13 PROCEDURE — 97110 THERAPEUTIC EXERCISES: CPT

## 2021-10-13 PROCEDURE — 97140 MANUAL THERAPY 1/> REGIONS: CPT

## 2021-10-13 NOTE — PROGRESS NOTES
Dx:     Acute right-sided low back pain without sciatica (M54.5)     Insurance (Authorized # of Visits):  Medicaid 8 visits cert ends 31/93/6466       Authorizing Physician: Dr. Olvin Anthony  Next MD visit: none scheduled  Fall Risk: standard         Precaut provocation of pain. (MET)  3. Patient will have 5/5 MMT grade LE strength to walk at least 2 miles to resume her walking program as PLF. (IN PROGRESS)      Plan: Hold on PT at this time due to pt going on an extended vacation.  She will call rehab to sched lumbar paraspinals (emphasis at R) Manual Therapy  B hip PROM  Prone: P/A mob at mid/lower thoracic and lumbar spine grades II-III  Prone: MFR at lower lumbar paraspinals (emphasis at R) Manual Therapy  B hip PROM  Prone: P/A mob at mid/lower thoracic and

## 2021-10-18 ENCOUNTER — APPOINTMENT (OUTPATIENT)
Dept: PHYSICAL THERAPY | Age: 61
End: 2021-10-18
Attending: INTERNAL MEDICINE
Payer: MEDICAID

## 2021-11-21 RX ORDER — ALPRAZOLAM 0.5 MG/1
0.5 TABLET ORAL 2 TIMES DAILY PRN
Qty: 60 TABLET | Refills: 0 | Status: SHIPPED | OUTPATIENT
Start: 2021-11-21

## 2021-12-10 ENCOUNTER — OFFICE VISIT (OUTPATIENT)
Dept: PULMONOLOGY | Facility: CLINIC | Age: 61
End: 2021-12-10
Payer: MEDICAID

## 2021-12-10 VITALS
SYSTOLIC BLOOD PRESSURE: 124 MMHG | BODY MASS INDEX: 26.07 KG/M2 | HEART RATE: 94 BPM | DIASTOLIC BLOOD PRESSURE: 79 MMHG | OXYGEN SATURATION: 98 % | HEIGHT: 68 IN | WEIGHT: 172 LBS

## 2021-12-10 DIAGNOSIS — G47.10 HYPERSOMNIA: Primary | ICD-10-CM

## 2021-12-10 PROCEDURE — 99244 OFF/OP CNSLTJ NEW/EST MOD 40: CPT | Performed by: INTERNAL MEDICINE

## 2021-12-10 PROCEDURE — 3078F DIAST BP <80 MM HG: CPT | Performed by: INTERNAL MEDICINE

## 2021-12-10 PROCEDURE — 3008F BODY MASS INDEX DOCD: CPT | Performed by: INTERNAL MEDICINE

## 2021-12-10 PROCEDURE — 3074F SYST BP LT 130 MM HG: CPT | Performed by: INTERNAL MEDICINE

## 2021-12-10 NOTE — H&P
Referring Physician  Audrey Jones MD    Chief Complaint  Sleep apnea evaluation    History of Present Illness  Patient is a 14-year-old female presents to pulmonary clinic for initial visit.   Admits to increased hypersomnia, snoring over the last 2 right knee meniscal tear   • RADIATION Bilateral 04/15/2019    recent radiation tx for breast cancer   • REDUCTION LEFT  05/2014   • REDUCTION OF LARGE BREAST Bilateral 2014   • REDUCTION RIGHT  05/2014       Family History  Family History   Adopted:  Yes today for evaluation of possible LAURY. I have intermediate suspicion for LAURY. Champion Sleepiness Scale score is 3. I will obtain home polysomnography. Once I review results we will set up with CPAP or other treatment options as necessary.     [place]

## 2022-02-09 ENCOUNTER — PATIENT MESSAGE (OUTPATIENT)
Dept: INTERNAL MEDICINE CLINIC | Facility: CLINIC | Age: 62
End: 2022-02-09

## 2022-02-09 RX ORDER — ALPRAZOLAM 0.5 MG/1
0.5 TABLET ORAL 2 TIMES DAILY PRN
Qty: 60 TABLET | Refills: 0 | Status: SHIPPED | OUTPATIENT
Start: 2022-02-09

## 2022-02-09 NOTE — TELEPHONE ENCOUNTER
From: Aleida Moreno  To: Rere English MD  Sent: 2/9/2022 8:47 AM CST  Subject: refill Alprazolam .5 mg     Hello Dr. Karle Meckel,  We drove to [place] and are here for a few weeks. Can you please refill my xanax at the [facility] ?  Address: [address] [place] [address], [address]  [phone]    Thank you very much. Michael Angulo  [phone]

## 2022-02-09 NOTE — TELEPHONE ENCOUNTER
Please review. Protocol failed or has no protocol. Requested Prescriptions   Pending Prescriptions Disp Refills    ALPRAZolam 0.5 MG Oral Tab 60 tablet 0     Sig: Take 1 tablet (0.5 mg total) by mouth 2 (two) times daily as needed for Anxiety. There is no refill protocol information for this order           Recent Outpatient Visits              [facility], [place], [place]    Office Visit    3 months ago     [facility] [employer] [address] [place] [address]    Office Visit    4 months ago Encounter for gynecological examination without abnormal finding    [facility] [place], [address], Jose A Church MD    Office Visit    4 months ago     [address] [facility], [place], [place]    Office Visit    4 months ago     [facility] [employer] [address] [place] [address]    Office Visit            Future Appointments         Provider Department Appt Notes    In 2 months [address] [facility] [address] no pa req  call with cancellations before 12/28.  pt is very flexible with days and hours    In 2 months Nathalia Nath MD [facility] Dermatology full skin check

## 2022-02-14 ENCOUNTER — PATIENT MESSAGE (OUTPATIENT)
Dept: INTERNAL MEDICINE CLINIC | Facility: CLINIC | Age: 62
End: 2022-02-14

## 2022-02-14 NOTE — TELEPHONE ENCOUNTER
Hilda Clifford RN 2/14/2022 1:25 PM CST        ----- Message -----  From: Shahid Meyer  Sent: 2/14/2022 12:47 PM CST  To: Em Rn Triage  Subject: refill Alprazolam .5 mg     Hi, I know you are all super busy, but could someone please get my prescription in to [facility] in [place]?        Thanks,    [facility]

## 2022-02-14 NOTE — TELEPHONE ENCOUNTER
Alprazolam 0.5mg RX was approved 2/9/22 and sent to [facility] in [facility] as requested. Called [facility].   On hold for more than 10 min. Sent patient mychart with update.

## 2022-04-18 ENCOUNTER — OFFICE VISIT (OUTPATIENT)
Dept: SLEEP CENTER | Age: 62
End: 2022-04-18
Attending: INTERNAL MEDICINE
Payer: MEDICAID

## 2022-04-18 DIAGNOSIS — G47.10 HYPERSOMNIA: ICD-10-CM

## 2022-04-18 PROCEDURE — 95806 SLEEP STUDY UNATT&RESP EFFT: CPT

## 2022-04-21 ENCOUNTER — TELEPHONE (OUTPATIENT)
Dept: PULMONOLOGY | Facility: CLINIC | Age: 62
End: 2022-04-21

## 2022-04-22 NOTE — TELEPHONE ENCOUNTER
Given the severe nature of her sleep apnea oral appliance would not be as effective. CPAP would be strongly preferred first-line option. The more severe the sleep apnea the less effective oral appliance becomes. If she cannot tolerate full facemask we could potentially try nasal mask or nasal pillow. Let me know what she decides.

## 2022-04-22 NOTE — TELEPHONE ENCOUNTER
Spoke with patient informed her of Dr. Paola Ervin result note. Patient states she will be unable to spend the night at the sleep center, and is claustrophobic thinking about wearing the CPAP mask since she is a mouth breather. Dr. Robert Ambrocio - Patient is inquiring if she is a candidate for oral appliance.

## 2022-04-22 NOTE — TELEPHONE ENCOUNTER
You may let the patient know that her sleep study with results revealed evidence of severe obstructive sleep apnea. She needs to be set up with CPAP device. Given severity of sleep apnea ideally recommend CPAP titration so we know exact pressure settings. Patient prefers not to have CPAP titration done let me know and I will order auto CPAP for the patient. Also can you ask the patient if she is a nose or mouth breather while sleeping.

## 2022-04-22 NOTE — TELEPHONE ENCOUNTER
Spoke with [atient informed her of Dr. Joan López message. Patient verbalized understanding and will call pulmo office back next week with her decision.

## 2022-05-05 NOTE — TELEPHONE ENCOUNTER
Spoke with patient, is agreeable with APAP being ordered. Requesting nasal mask/ nasal pillow. Provided [facility]'s phone number and informed patient to return to office between 30 - 90 days of receiving CPAP machine. Patient verbalized understanding. Dr. Yoshi Clark - Please review pended order and complete Auto CPAP settings.

## 2022-05-06 NOTE — TELEPHONE ENCOUNTER
CPAP order, face sheet, office visit notes and sleep study faxed to Max Aguilar at [phone]. Fax confirmation received.

## 2022-05-06 NOTE — TELEPHONE ENCOUNTER
I have placed order for auto CPAP for the patient.   Should be seen in pulmonary clinic between 30 and 90 days of being set up with CPAP device

## 2022-05-07 NOTE — PROGRESS NOTES
HPI:    Patient ID: Adebayo Kelley is a 63year old female. Patient presents today for annual physical.       Review of Systems   Constitutional: Negative. HENT: Negative. Eyes: Negative. Respiratory: Negative. Cardiovascular: Negative. HIVES  Cephalosporins          HIVES  Sulfamethoxazole        HIVES  Trimethoprim            HIVES  Milk-Related Compou*    PAIN    Comment:Stomach pain, diarrhea  Dust Mite Extract       ITCHING  Penicillins             HIVES, RASH   PHYSICAL EXAM:   Phys by oncologist at [facility].     (F41.9,  F32.9) Anxiety and depression  Plan: stable on xanax and venlafaxine. Pt also ffup with her counselor.  We are taking over her refills for her meds.     (M48.02) Spinal stenosis in cervical region  Plan: pt being ff and tr negative

## 2022-05-10 ENCOUNTER — OFFICE VISIT (OUTPATIENT)
Dept: OTOLARYNGOLOGY | Facility: CLINIC | Age: 62
End: 2022-05-10
Payer: MEDICAID

## 2022-05-10 DIAGNOSIS — R09.81 NASAL CONGESTION: ICD-10-CM

## 2022-05-10 DIAGNOSIS — J34.2 DEVIATED NASAL SEPTUM: Primary | ICD-10-CM

## 2022-05-10 PROCEDURE — 99203 OFFICE O/P NEW LOW 30 MIN: CPT | Performed by: OTOLARYNGOLOGY

## 2022-05-10 RX ORDER — MONTELUKAST SODIUM 10 MG/1
10 TABLET ORAL NIGHTLY
Qty: 30 TABLET | Refills: 3 | Status: SHIPPED | OUTPATIENT
Start: 2022-05-10

## 2022-05-10 RX ORDER — AZELASTINE 1 MG/ML
2 SPRAY, METERED NASAL 2 TIMES DAILY
Qty: 30 ML | Refills: 0 | Status: SHIPPED | OUTPATIENT
Start: 2022-05-10

## 2022-05-11 ENCOUNTER — TELEMEDICINE (OUTPATIENT)
Dept: INTERNAL MEDICINE CLINIC | Facility: CLINIC | Age: 62
End: 2022-05-11

## 2022-05-11 ENCOUNTER — PATIENT MESSAGE (OUTPATIENT)
Dept: INTERNAL MEDICINE CLINIC | Facility: CLINIC | Age: 62
End: 2022-05-11

## 2022-05-11 ENCOUNTER — TELEPHONE (OUTPATIENT)
Dept: OTOLARYNGOLOGY | Facility: CLINIC | Age: 62
End: 2022-05-11

## 2022-05-11 ENCOUNTER — TELEPHONE (OUTPATIENT)
Dept: INTERNAL MEDICINE CLINIC | Facility: CLINIC | Age: 62
End: 2022-05-11

## 2022-05-11 DIAGNOSIS — U07.1 COVID-19: Primary | ICD-10-CM

## 2022-05-11 LAB — AMB EXT COVID-19 RESULT: DETECTED

## 2022-05-11 PROCEDURE — 99214 OFFICE O/P EST MOD 30 MIN: CPT | Performed by: INTERNAL MEDICINE

## 2022-05-11 NOTE — TELEPHONE ENCOUNTER
From: Shahid Meyer  To: Master Emerson MD  Sent: 5/11/2022 9:19 AM CDT  Subject: jasmyne Aviles,    I am positive for Covid, can you please call in paxlovid for me at [facility] on [address]?       Jonatan Austin

## 2022-05-11 NOTE — TELEPHONE ENCOUNTER
Patient states she was in yesterday and unfortunately tested positive for covid today. Wanted to make office aware.

## 2022-05-11 NOTE — TELEPHONE ENCOUNTER
Action Requested: Summary for Provider     []  Critical Lab, Recommendations Needed  [] Need Additional Advice  []   FYI    []   Need Orders  [] Need Medications Sent to Pharmacy  []  Other     SUMMARY: Per protocol; video visit scheduled    Reason for call: Acute (coivd positive)  Onset: Yesterday    Tested positive for Covid today; attended a wedding in [place] over the weekend  Body aches, dry cough, congestion, feels like \"crap\"  Fever yesterday 101. Tylenol has helped with body aches and fever reduction    Would like paxlovid for treatment. Video visit scheduled for evaluation. Patient scheduled for video visit. Patient advised to complete the e-check in in [employer], if active. Understands to follow the prompts and links to complete the visit. Patient advised that there may be a co-pay involved in this type of visit. Patient agreed to proceed, they understand the provider may be calling from a blocked, or unknown phone number on their caller ID and they know to answer the phone. Best call back:    [phone]    Chart banner updated.

## 2022-05-13 RX ORDER — VENLAFAXINE HYDROCHLORIDE 75 MG/1
75 CAPSULE, EXTENDED RELEASE ORAL DAILY
Qty: 90 CAPSULE | Refills: 0 | Status: SHIPPED | OUTPATIENT
Start: 2022-05-13 | End: 2022-07-13 | Stop reason: ALTCHOICE

## 2022-05-14 RX ORDER — ALPRAZOLAM 0.5 MG/1
0.5 TABLET ORAL 2 TIMES DAILY PRN
Qty: 60 TABLET | Refills: 0 | Status: SHIPPED | OUTPATIENT
Start: 2022-05-14 | End: 2022-08-05

## 2022-05-14 NOTE — TELEPHONE ENCOUNTER
Please review; protocol failed/no protocol. Requested Prescriptions   Pending Prescriptions Disp Refills    ALPRAZOLAM 0.5 MG Oral Tab [Pharmacy Med Name: Alprazolam 0.5 Mg Tab Jai] 60 tablet 0     Sig: Take 1 tablet (0.5 mg total) by mouth 2 (two) times daily as needed for Anxiety. There is no refill protocol information for this order       Signed Prescriptions Disp Refills    venlafaxine ER 75 MG Oral Capsule SR 24 Hr 90 capsule 0     Sig: Take 1 capsule (75 mg total) by mouth daily.         Psychiatric Non-Scheduled (Anti-Anxiety) Failed - 5/13/2022  8:14 PM        Failed - Appointment in last 6 or next 3 months               Recent Outpatient Visits              2 days ago [address], [address], [facility] Ernestina Collado MD    Telemedicine    3 days ago Deviated nasal septum    [facility] [place], [address], [place], Braxton Schwab, MD    Office Visit    3 weeks ago Hypersomnia    [facility]    Office Visit    5 months ago [address] [facility], [address] [facility], [place], [place]    Office Visit    7 months ago     [address] [place], [place]    Office Visit             Future Appointments         Provider Department Appt Notes    In 3 weeks Amanda Walters MD [facility] [place] [facility], [address] 1 month f/u    In 1 month Swathi Smart MD [facility] Dermatology full skin chck

## 2022-05-14 NOTE — TELEPHONE ENCOUNTER
Approved 90 day supply per triage protocol, will need to schedule follow up appointment in order to get further refills. CSS=please call and assist.              Refill passed per [facility] [employer] protocol. Requested Prescriptions   Pending Prescriptions Disp Refills    VENLAFAXINE ER 75 MG Oral Capsule SR 24 Hr [Pharmacy Med Name: Venlafaxine Hydrochloride Er 24hr 75 Mg Cap Auro] 90 capsule 0     Sig: TAKE ONE CAPSULE BY MOUTH ONE TIME DAILY        Psychiatric Non-Scheduled (Anti-Anxiety) Failed - 5/13/2022  8:14 PM        Failed - Appointment in last 6 or next 3 months           ALPRAZOLAM 0.5 MG Oral Tab [Pharmacy Med Name: Alprazolam 0.5 Mg Tab Gree] 60 tablet 0     Sig: Take 1 tablet (0.5 mg total) by mouth 2 (two) times daily as needed for Anxiety.         There is no refill protocol information for this order            Future Appointments         Provider Department Appt Notes    In 3 weeks Denita Apley, MD [facility], [address] 1 month f/u    In 1 month Vianca Becerra MD [facility] [place] Dermatology full skin chck              Recent Outpatient Visits              2 days ago [address], [address], Torsten Ervin MD    Telemedicine    3 days ago Deviated nasal septum    [facility] [place], [address], Ren Poole MD    Office Visit    3 weeks ago Dieter Vincent    Office Visit    5 months ago Hypersomnia    [place], [address] [facility], [place] [facility] [place]    Office Visit    7 months ago     [address] [facility], [place], [place]    Office Visit

## 2022-05-16 NOTE — TELEPHONE ENCOUNTER
Phoned the patient and informed her of the message below. Pt will schedule her appointment thru her my chart.

## 2022-06-07 ENCOUNTER — OFFICE VISIT (OUTPATIENT)
Dept: OTOLARYNGOLOGY | Facility: CLINIC | Age: 62
End: 2022-06-07
Payer: MEDICAID

## 2022-06-07 DIAGNOSIS — R09.81 NASAL CONGESTION: ICD-10-CM

## 2022-06-07 DIAGNOSIS — J34.2 DEVIATED NASAL SEPTUM: Primary | ICD-10-CM

## 2022-06-07 PROCEDURE — 99213 OFFICE O/P EST LOW 20 MIN: CPT | Performed by: OTOLARYNGOLOGY

## 2022-06-15 ENCOUNTER — PATIENT MESSAGE (OUTPATIENT)
Dept: PULMONOLOGY | Facility: CLINIC | Age: 62
End: 2022-06-15

## 2022-06-15 NOTE — TELEPHONE ENCOUNTER
From: Nicolina Cheadle  To: Stephania Pacheco DO  Sent: 6/15/2022 12:05 PM CDT  Subject: Cpap    Hello Dr. Aida Wilcox,  I just wanted to let you know nobody has reached out to me regarding getting a cpap. I believe I had sleep study test in April. I understood they were recalled or something of that nature so maybe this is normal.   Just letting you know. Thanks.     Malina Garza  [phone]

## 2022-06-17 ENCOUNTER — PATIENT MESSAGE (OUTPATIENT)
Dept: PULMONOLOGY | Facility: CLINIC | Age: 62
End: 2022-06-17

## 2022-06-27 ENCOUNTER — OFFICE VISIT (OUTPATIENT)
Dept: DERMATOLOGY CLINIC | Facility: CLINIC | Age: 62
End: 2022-06-27
Payer: MEDICAID

## 2022-06-27 DIAGNOSIS — L81.4 LENTIGO: ICD-10-CM

## 2022-06-27 DIAGNOSIS — D22.9 MULTIPLE NEVI: ICD-10-CM

## 2022-06-27 DIAGNOSIS — D48.5 NEOPLASM OF UNCERTAIN BEHAVIOR OF SKIN: Primary | ICD-10-CM

## 2022-06-27 DIAGNOSIS — D03.59 MALIGNANT MELANOMA IN SITU OF SKIN OF ANTERIOR CHEST (HCC): ICD-10-CM

## 2022-06-27 DIAGNOSIS — L82.1 SEBORRHEIC KERATOSES: ICD-10-CM

## 2022-06-27 DIAGNOSIS — D23.9 BENIGN NEOPLASM OF SKIN, UNSPECIFIED LOCATION: ICD-10-CM

## 2022-06-27 PROCEDURE — 88305 TISSUE EXAM BY PATHOLOGIST: CPT | Performed by: DERMATOLOGY

## 2022-06-27 RX ORDER — OLOPATADINE HYDROCHLORIDE 7 MG/ML
SOLUTION OPHTHALMIC
COMMUNITY
Start: 2022-04-25

## 2022-06-29 ENCOUNTER — TELEPHONE (OUTPATIENT)
Dept: DERMATOLOGY CLINIC | Facility: CLINIC | Age: 62
End: 2022-06-29

## 2022-06-30 NOTE — PROGRESS NOTES
Logged in path book and pmh. Pt informed of pathology results and KMT's recommendations for treatment. Pt prefers Dr. Crystal Glass. Pt provided with his contact information. Pt scheduled for 4 month f/u in October 2022.

## 2022-06-30 NOTE — PROGRESS NOTES
Operative Report                     Shave/  Tangential biopsy     Clinical diagnosis:    Size of lesion:    Location:  pt with irregular brown macule  Spec 1 Description >>>>>: left chest  Spec 1 Comment: r/o atypical pigmented lesion 8mm changing lesion  Procedure: With patient in appropriate position the skin of the above was scrubbed with alcohol. Anesthesia was obtained with 1% Xylocaine with epinephrine. The skin surrounding the lesion was placed under tension and the lesion was incised using a #15 scalpel blade. The specimen was sent for histopathologic exam.    Hemostasis was obtained with electrocautery/aluminum chloride. Estimated blood loss less than 2 cc. Biopsy dressed with Polysporin, bandage. Pressure dressing:   No    Complications: None    Written instructions given and reviewed with patient    Await pathology    Contact information reviewed.     Procedural physician:  Prasanna Deras MD

## 2022-06-30 NOTE — PROGRESS NOTES
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 She has seen Dr. Stacey Rascon previously This will need adequate margins. Please let me know if this is okay with patient . please log in test results. Please call patient and inform of results and recommendations.  (please add to history). Pt to  rtc for follow-up~4 months or prn.

## 2022-07-07 ENCOUNTER — PATIENT MESSAGE (OUTPATIENT)
Dept: PULMONOLOGY | Facility: CLINIC | Age: 62
End: 2022-07-07

## 2022-07-07 NOTE — TELEPHONE ENCOUNTER
From: Elaine Lai  Sent: 7/7/2022 3:07 PM CDT  To: Yamilka Perez Clinical Staff  Subject: Cpap    Ruslan Oviedo [facility],    It appears my cpap technician will be coming on July 14 to fit me. I also want to have a mouth appliance made for when i travel, is that okay with you, insurance etc. ?     Thanks,  [employer]

## 2022-07-12 NOTE — TELEPHONE ENCOUNTER
DO Yamilka Coon Pulmo Clinical Staff 12 minutes ago (1:33 PM)         I am okay with the patient if she wants to use her mouth appliance when she travels but technically she can also travel with her CPAP device if she wishes      My chart message sent to patient.

## 2022-07-13 ENCOUNTER — OFFICE VISIT (OUTPATIENT)
Dept: SURGERY | Facility: CLINIC | Age: 62
End: 2022-07-13
Payer: MEDICAID

## 2022-07-13 VITALS
HEIGHT: 68 IN | RESPIRATION RATE: 18 BRPM | DIASTOLIC BLOOD PRESSURE: 88 MMHG | BODY MASS INDEX: 26.52 KG/M2 | WEIGHT: 175 LBS | TEMPERATURE: 97 F | OXYGEN SATURATION: 96 % | HEART RATE: 77 BPM | SYSTOLIC BLOOD PRESSURE: 133 MMHG

## 2022-07-13 DIAGNOSIS — D03.52: Primary | ICD-10-CM

## 2022-07-13 PROCEDURE — 3075F SYST BP GE 130 - 139MM HG: CPT | Performed by: SURGERY

## 2022-07-13 PROCEDURE — 99245 OFF/OP CONSLTJ NEW/EST HI 55: CPT | Performed by: SURGERY

## 2022-07-13 PROCEDURE — 3079F DIAST BP 80-89 MM HG: CPT | Performed by: SURGERY

## 2022-07-13 PROCEDURE — 3008F BODY MASS INDEX DOCD: CPT | Performed by: SURGERY

## 2022-07-14 DIAGNOSIS — D03.52: Primary | ICD-10-CM

## 2022-07-20 RX ORDER — AZELASTINE HYDROCHLORIDE 137 UG/1
SPRAY, METERED NASAL
Qty: 30 ML | Refills: 5 | Status: SHIPPED | OUTPATIENT
Start: 2022-07-20

## 2022-07-25 ENCOUNTER — HOSPITAL ENCOUNTER (OUTPATIENT)
Facility: HOSPITAL | Age: 62
Setting detail: HOSPITAL OUTPATIENT SURGERY
Discharge: HOME | End: 2022-07-25
Attending: SURGERY | Admitting: SURGERY
Payer: MEDICAID

## 2022-07-25 VITALS
BODY MASS INDEX: 26.52 KG/M2 | SYSTOLIC BLOOD PRESSURE: 145 MMHG | WEIGHT: 175 LBS | HEIGHT: 68 IN | TEMPERATURE: 98 F | DIASTOLIC BLOOD PRESSURE: 79 MMHG | RESPIRATION RATE: 16 BRPM | HEART RATE: 97 BPM | OXYGEN SATURATION: 97 %

## 2022-07-25 DIAGNOSIS — D03.52: ICD-10-CM

## 2022-07-25 PROCEDURE — 88341 IMHCHEM/IMCYTCHM EA ADD ANTB: CPT | Performed by: SURGERY

## 2022-07-25 PROCEDURE — 0JB60ZZ EXCISION OF CHEST SUBCUTANEOUS TISSUE AND FASCIA, OPEN APPROACH: ICD-10-PCS | Performed by: SURGERY

## 2022-07-25 PROCEDURE — 88305 TISSUE EXAM BY PATHOLOGIST: CPT | Performed by: SURGERY

## 2022-07-25 PROCEDURE — 88342 IMHCHEM/IMCYTCHM 1ST ANTB: CPT | Performed by: SURGERY

## 2022-07-25 PROCEDURE — 88307 TISSUE EXAM BY PATHOLOGIST: CPT | Performed by: SURGERY

## 2022-07-25 RX ORDER — LIDOCAINE HYDROCHLORIDE AND EPINEPHRINE 10; 10 MG/ML; UG/ML
INJECTION, SOLUTION INFILTRATION; PERINEURAL AS NEEDED
Status: DISCONTINUED | OUTPATIENT
Start: 2022-07-25 | End: 2022-07-25 | Stop reason: HOSPADM

## 2022-07-25 RX ORDER — TRAMADOL HYDROCHLORIDE 50 MG/1
50 TABLET ORAL EVERY 4 HOURS PRN
Qty: 10 TABLET | Refills: 0 | Status: SHIPPED | OUTPATIENT
Start: 2022-07-25

## 2022-07-26 ENCOUNTER — TELEPHONE (OUTPATIENT)
Dept: SURGERY | Facility: CLINIC | Age: 62
End: 2022-07-26

## 2022-07-26 NOTE — OPERATIVE REPORT
Date of operation 7/25/2022    Preoperative diagnosis: #1 left upper chest melanoma in situ    Operations performed: #1 wide local excision, left upper chest melanoma in situ [2 x 6 cm in greatest dimension]  2. Complex closure, left upper chest defect measuring 2 x 6 cm    Postoperative diagnosis:  1. Same    Operating surgeon:  1. Lex Soto MD    Assistant:  1. TOM Townsend    Indications: This is a very pleasant 50-year-old female who was diagnosed with melanoma in situ of the left upper chest.  Exam was otherwise unremarkable. She presents for definitive surgical management. Details of the operation:  The patient was brought to the operating room and laid supine on the operating table. All pressure points were padded appropriately. The left arm was extended. The left chest was clipped prepped and draped in the standard sterile manner. Timeout was completed verifying the patient's name, procedure to be performed and laterality. Everybody in the room was in agreement. A total of 25 cc of 1% lidocaine intermixed with 0.5% Marcaine with epinephrine were infiltrated. A linear elliptical incision was designed along Walter's lines longitudinally. 0.5 cm margins were marked out from the edges of the scar. 15 blade was used to incise the skin. This was deepened through skin and subcutaneous tissue with electrocautery. Dissection was carried towards the muscular fascia which was not included alongside the specimen. The specimen was then lifted off of its underlying attachments and completely excised. It was then oriented and then sent off for final pathologic analysis. Hemostasis was excellent. To close the wound without tension, flaps were raised in the lateral medial directions. The wound was then closed in 2 layers: 3-0 Vicryl interrupted subcutaneous and 4-0 Vicryl running subcuticular. Steri-Strips and sterile dressings were applied.     The patient tolerated the procedure well.  I was present for the entire case. Alan Blunt MD  [facility] General Surgical Oncology  [place] [address]  Pager [phone]  [email or web address]

## 2022-07-26 NOTE — TELEPHONE ENCOUNTER
Called to check on pt after her procedure with Dr. Diann Roman yesterday. Pt doing well, pain controlled with Tylenol. No concerns with surgical site, path pending. Post op appt scheduled on 8/10 at 9:45am at [facility]. Pt knows to call with any questions or concerns. TRANSFER - OUT REPORT:    Verbal report given to Matthew Nova RN (name) on Reese Hunter  being transferred to [facility] (unit) for routine progression of care       Report consisted of patients Situation, Background, Assessment and   Recommendations(SBAR). Information from the following report(s) SBAR, ED Summary, [facility] and Recent Results was reviewed with the receiving nurse. Lines:   Peripheral IV 09/12/17 Right Antecubital (Active)   Site Assessment Clean, dry, & intact 9/12/2017  4:06 PM        Opportunity for questions and clarification was provided.       Patient transported with:  Transport

## 2022-07-28 ENCOUNTER — TELEPHONE (OUTPATIENT)
Dept: SURGERY | Facility: CLINIC | Age: 62
End: 2022-07-28

## 2022-07-28 NOTE — TELEPHONE ENCOUNTER
LVMTCB about pathology results. Catherine Tuttle PA-C  Department of Surgical Oncology  [address] [facility], [address] [facility] [address] [facility]  [address] [place] [address], [address] [place] [address]  T: [phone]  F: [phone]

## 2022-07-29 ENCOUNTER — TELEPHONE (OUTPATIENT)
Dept: SURGERY | Facility: CLINIC | Age: 62
End: 2022-07-29

## 2022-07-29 NOTE — TELEPHONE ENCOUNTER
Patient returning call after voicemail yesterday. Patient feeling well. Discussed pathology results. She will f/u in clinic for schedules post-op appointment    Gudelia Álvarez PA-C  Department of Surgical Oncology  [address] [facility], [address] [facility] AND [facility]  [address] [place] [address],  Maddie Pearl [address]  T: [phone]  F: [phone]

## 2022-08-02 ENCOUNTER — PATIENT MESSAGE (OUTPATIENT)
Dept: PULMONOLOGY | Facility: CLINIC | Age: 62
End: 2022-08-02

## 2022-08-02 NOTE — TELEPHONE ENCOUNTER
Called [facility] stated patient just received new mask two days ago and might be the reason why her reading are off. Needs to call them so they can get her fitted to correct mask. My chart message sent to patient.

## 2022-08-04 NOTE — TELEPHONE ENCOUNTER
From: Stewart Quiñonez  Sent: 8/3/2022 9:57 AM CDT  To: Em Pulmo Clinical Staff  Subject: Cpap    Good morning,  I will be calling home health today. I wore cpap from 11:30pm to 6am.   Again, it says I only used machine for 2 hours. I just want Dr. Joy Fuller to know this is going on. Thanks.    [employer]

## 2022-08-05 RX ORDER — ALPRAZOLAM 0.5 MG/1
0.5 TABLET ORAL 2 TIMES DAILY PRN
Qty: 60 TABLET | Refills: 0 | Status: SHIPPED | OUTPATIENT
Start: 2022-08-05

## 2022-08-05 NOTE — TELEPHONE ENCOUNTER
Called [facility], they states they are working with her to try and get this issue resolved. She just received 3B Kristina in July. Denita will call her to try and get this issue resolved. My chart message sent to patient.

## 2022-08-10 ENCOUNTER — OFFICE VISIT (OUTPATIENT)
Dept: SURGERY | Facility: CLINIC | Age: 62
End: 2022-08-10
Payer: MEDICAID

## 2022-08-10 ENCOUNTER — PATIENT MESSAGE (OUTPATIENT)
Dept: PULMONOLOGY | Facility: CLINIC | Age: 62
End: 2022-08-10

## 2022-08-10 VITALS
RESPIRATION RATE: 16 BRPM | HEART RATE: 79 BPM | DIASTOLIC BLOOD PRESSURE: 84 MMHG | TEMPERATURE: 98 F | HEIGHT: 68 IN | BODY MASS INDEX: 26.07 KG/M2 | OXYGEN SATURATION: 98 % | SYSTOLIC BLOOD PRESSURE: 134 MMHG | WEIGHT: 172 LBS

## 2022-08-10 DIAGNOSIS — D03.52: Primary | ICD-10-CM

## 2022-08-10 PROCEDURE — 3008F BODY MASS INDEX DOCD: CPT | Performed by: SURGERY

## 2022-08-10 PROCEDURE — 3075F SYST BP GE 130 - 139MM HG: CPT | Performed by: SURGERY

## 2022-08-10 PROCEDURE — 99024 POSTOP FOLLOW-UP VISIT: CPT | Performed by: SURGERY

## 2022-08-10 PROCEDURE — 3079F DIAST BP 80-89 MM HG: CPT | Performed by: SURGERY

## 2022-08-12 ENCOUNTER — PATIENT MESSAGE (OUTPATIENT)
Dept: INTERNAL MEDICINE CLINIC | Facility: CLINIC | Age: 62
End: 2022-08-12

## 2022-08-18 ENCOUNTER — MED REC SCAN ONLY (OUTPATIENT)
Dept: DERMATOLOGY CLINIC | Facility: CLINIC | Age: 62
End: 2022-08-18

## 2022-08-18 ENCOUNTER — TELEPHONE (OUTPATIENT)
Dept: DERMATOLOGY CLINIC | Facility: CLINIC | Age: 62
End: 2022-08-18

## 2022-09-28 ENCOUNTER — LAB ENCOUNTER (OUTPATIENT)
Dept: LAB | Age: 62
End: 2022-09-28
Attending: INTERNAL MEDICINE
Payer: MEDICAID

## 2022-09-28 ENCOUNTER — HOSPITAL ENCOUNTER (OUTPATIENT)
Dept: GENERAL RADIOLOGY | Age: 62
Discharge: HOME | End: 2022-09-28
Attending: INTERNAL MEDICINE
Payer: MEDICAID

## 2022-09-28 ENCOUNTER — OFFICE VISIT (OUTPATIENT)
Dept: INTERNAL MEDICINE CLINIC | Facility: CLINIC | Age: 62
End: 2022-09-28

## 2022-09-28 VITALS
HEART RATE: 86 BPM | TEMPERATURE: 98 F | WEIGHT: 183.63 LBS | OXYGEN SATURATION: 98 % | HEIGHT: 68 IN | BODY MASS INDEX: 27.83 KG/M2 | SYSTOLIC BLOOD PRESSURE: 107 MMHG | DIASTOLIC BLOOD PRESSURE: 73 MMHG

## 2022-09-28 DIAGNOSIS — R06.02 SHORTNESS OF BREATH: ICD-10-CM

## 2022-09-28 DIAGNOSIS — Z00.00 ANNUAL PHYSICAL EXAM: ICD-10-CM

## 2022-09-28 DIAGNOSIS — Z00.00 ANNUAL PHYSICAL EXAM: Primary | ICD-10-CM

## 2022-09-28 DIAGNOSIS — Z17.0 MALIGNANT NEOPLASM OF UPPER-INNER QUADRANT OF LEFT BREAST IN FEMALE, ESTROGEN RECEPTOR POSITIVE (HCC): ICD-10-CM

## 2022-09-28 DIAGNOSIS — C50.212 MALIGNANT NEOPLASM OF UPPER-INNER QUADRANT OF LEFT BREAST IN FEMALE, ESTROGEN RECEPTOR POSITIVE (HCC): ICD-10-CM

## 2022-09-28 DIAGNOSIS — F41.1 GAD (GENERALIZED ANXIETY DISORDER): ICD-10-CM

## 2022-09-28 DIAGNOSIS — D03.8 MELANOMA IN SITU OF OTHER SITE (HCC): ICD-10-CM

## 2022-09-28 LAB
ALBUMIN SERPL-MCNC: 4.1 G/DL (ref 3.4–5)
ALBUMIN/GLOB SERPL: 1.1 {RATIO} (ref 1–2)
ALP LIVER SERPL-CCNC: 93 U/L
ALT SERPL-CCNC: 29 U/L
ANION GAP SERPL CALC-SCNC: 5 MMOL/L (ref 0–18)
AST SERPL-CCNC: 21 U/L (ref 15–37)
BASOPHILS # BLD AUTO: 0.04 X10(3) UL (ref 0–0.2)
BASOPHILS NFR BLD AUTO: 0.9 %
BILIRUB SERPL-MCNC: 0.6 MG/DL (ref 0.1–2)
BUN BLD-MCNC: 16 MG/DL (ref 7–18)
BUN/CREAT SERPL: 16.2 (ref 10–20)
CALCIUM BLD-MCNC: 9.6 MG/DL (ref 8.5–10.1)
CHLORIDE SERPL-SCNC: 107 MMOL/L (ref 98–112)
CHOLEST SERPL-MCNC: 220 MG/DL (ref ?–200)
CO2 SERPL-SCNC: 27 MMOL/L (ref 21–32)
CREAT BLD-MCNC: 0.99 MG/DL
DEPRECATED RDW RBC AUTO: 42.5 FL (ref 35.1–46.3)
EOSINOPHIL # BLD AUTO: 0.18 X10(3) UL (ref 0–0.7)
EOSINOPHIL NFR BLD AUTO: 3.9 %
ERYTHROCYTE [DISTWIDTH] IN BLOOD BY AUTOMATED COUNT: 12 % (ref 11–15)
FASTING PATIENT LIPID ANSWER: YES
FASTING STATUS PATIENT QL REPORTED: YES
GFR SERPLBLD BASED ON 1.73 SQ M-ARVRAT: 64 ML/MIN/1.73M2 (ref 60–?)
GLOBULIN PLAS-MCNC: 3.6 G/DL (ref 2.8–4.4)
GLUCOSE BLD-MCNC: 97 MG/DL (ref 70–99)
HCT VFR BLD AUTO: 44.5 %
HDLC SERPL-MCNC: 67 MG/DL (ref 40–59)
HGB BLD-MCNC: 14.5 G/DL
IMM GRANULOCYTES # BLD AUTO: 0.01 X10(3) UL (ref 0–1)
IMM GRANULOCYTES NFR BLD: 0.2 %
LDLC SERPL CALC-MCNC: 140 MG/DL (ref ?–100)
LYMPHOCYTES # BLD AUTO: 1.43 X10(3) UL (ref 1–4)
LYMPHOCYTES NFR BLD AUTO: 31.2 %
MCH RBC QN AUTO: 30.9 PG (ref 26–34)
MCHC RBC AUTO-ENTMCNC: 32.6 G/DL (ref 31–37)
MCV RBC AUTO: 94.7 FL
MONOCYTES # BLD AUTO: 0.41 X10(3) UL (ref 0.1–1)
MONOCYTES NFR BLD AUTO: 8.9 %
NEUTROPHILS # BLD AUTO: 2.52 X10 (3) UL (ref 1.5–7.7)
NEUTROPHILS # BLD AUTO: 2.52 X10(3) UL (ref 1.5–7.7)
NEUTROPHILS NFR BLD AUTO: 54.9 %
NONHDLC SERPL-MCNC: 153 MG/DL (ref ?–130)
OSMOLALITY SERPL CALC.SUM OF ELEC: 289 MOSM/KG (ref 275–295)
PLATELET # BLD AUTO: 236 10(3)UL (ref 150–450)
POTASSIUM SERPL-SCNC: 4.3 MMOL/L (ref 3.5–5.1)
PROT SERPL-MCNC: 7.7 G/DL (ref 6.4–8.2)
RBC # BLD AUTO: 4.7 X10(6)UL
SODIUM SERPL-SCNC: 139 MMOL/L (ref 136–145)
T3FREE SERPL-MCNC: 2.9 PG/ML (ref 2.4–4.2)
T4 FREE SERPL-MCNC: 1 NG/DL (ref 0.8–1.7)
TRIGL SERPL-MCNC: 74 MG/DL (ref 30–149)
TSI SER-ACNC: 1.88 MIU/ML (ref 0.36–3.74)
VIT D+METAB SERPL-MCNC: 72.2 NG/ML (ref 30–100)
VLDLC SERPL CALC-MCNC: 14 MG/DL (ref 0–30)
WBC # BLD AUTO: 4.6 X10(3) UL (ref 4–11)

## 2022-09-28 PROCEDURE — 84443 ASSAY THYROID STIM HORMONE: CPT

## 2022-09-28 PROCEDURE — 85025 COMPLETE CBC W/AUTO DIFF WBC: CPT

## 2022-09-28 PROCEDURE — 84439 ASSAY OF FREE THYROXINE: CPT

## 2022-09-28 PROCEDURE — 3078F DIAST BP <80 MM HG: CPT | Performed by: INTERNAL MEDICINE

## 2022-09-28 PROCEDURE — 3008F BODY MASS INDEX DOCD: CPT | Performed by: INTERNAL MEDICINE

## 2022-09-28 PROCEDURE — 84481 FREE ASSAY (FT-3): CPT

## 2022-09-28 PROCEDURE — 3074F SYST BP LT 130 MM HG: CPT | Performed by: INTERNAL MEDICINE

## 2022-09-28 PROCEDURE — 80053 COMPREHEN METABOLIC PANEL: CPT

## 2022-09-28 PROCEDURE — 36415 COLL VENOUS BLD VENIPUNCTURE: CPT

## 2022-09-28 PROCEDURE — 99396 PREV VISIT EST AGE 40-64: CPT | Performed by: INTERNAL MEDICINE

## 2022-09-28 PROCEDURE — 71046 X-RAY EXAM CHEST 2 VIEWS: CPT | Performed by: INTERNAL MEDICINE

## 2022-09-28 PROCEDURE — 80061 LIPID PANEL: CPT

## 2022-09-28 PROCEDURE — 82306 VITAMIN D 25 HYDROXY: CPT

## 2022-09-29 ENCOUNTER — TELEPHONE (OUTPATIENT)
Dept: INTERNAL MEDICINE CLINIC | Facility: CLINIC | Age: 62
End: 2022-09-29

## 2022-09-29 NOTE — TELEPHONE ENCOUNTER
Patient called, verified Name and . She is verifying a message she received from [employer] regarding an Anticoagulation Visit with Dr. Dillon Late today. Upon chart review, did not find the message regarding an anticoagulation visit. Also no future appointments scheduled. Patient saw Dr. Dillon Late yesterday and there was no mention of any anticoagulation medication or visit. Patient will send a picture and send it to her [employer].

## 2022-09-30 RX ORDER — AZELASTINE HYDROCHLORIDE 137 UG/1
2 SPRAY, METERED NASAL 2 TIMES DAILY
Qty: 30 ML | Refills: 5 | Status: SHIPPED | OUTPATIENT
Start: 2022-09-30

## 2022-10-02 ENCOUNTER — TELEPHONE (OUTPATIENT)
Dept: INTERNAL MEDICINE CLINIC | Facility: CLINIC | Age: 62
End: 2022-10-02

## 2022-10-02 DIAGNOSIS — R06.02 SHORTNESS OF BREATH: Primary | ICD-10-CM

## 2022-10-03 ENCOUNTER — TELEPHONE (OUTPATIENT)
Dept: PULMONOLOGY | Facility: CLINIC | Age: 62
End: 2022-10-03

## 2022-10-06 NOTE — TELEPHONE ENCOUNTER
Order signed by Dr. Loli Prajapati and faxed back with confirmation. Sent out for scanning.
Received physician's order for CPAP supplies from [facility]. Order placed in Dr. Paola Ervin folder for signature.
73

## 2022-10-21 ENCOUNTER — OFFICE VISIT (OUTPATIENT)
Dept: ENDOCRINOLOGY CLINIC | Facility: CLINIC | Age: 62
End: 2022-10-21
Payer: MEDICAID

## 2022-10-21 ENCOUNTER — LAB ENCOUNTER (OUTPATIENT)
Dept: LAB | Facility: HOSPITAL | Age: 62
End: 2022-10-21
Attending: INTERNAL MEDICINE
Payer: MEDICAID

## 2022-10-21 ENCOUNTER — PATIENT MESSAGE (OUTPATIENT)
Dept: ENDOCRINOLOGY CLINIC | Facility: CLINIC | Age: 62
End: 2022-10-21

## 2022-10-21 VITALS — HEART RATE: 85 BPM | DIASTOLIC BLOOD PRESSURE: 81 MMHG | SYSTOLIC BLOOD PRESSURE: 137 MMHG

## 2022-10-21 DIAGNOSIS — R06.02 SHORTNESS OF BREATH: ICD-10-CM

## 2022-10-21 DIAGNOSIS — R22.1 NECK FULLNESS: Primary | ICD-10-CM

## 2022-10-21 LAB — SARS-COV-2 RNA RESP QL NAA+PROBE: NOT DETECTED

## 2022-10-21 PROCEDURE — 3079F DIAST BP 80-89 MM HG: CPT | Performed by: INTERNAL MEDICINE

## 2022-10-21 PROCEDURE — 99203 OFFICE O/P NEW LOW 30 MIN: CPT | Performed by: INTERNAL MEDICINE

## 2022-10-21 PROCEDURE — 3075F SYST BP GE 130 - 139MM HG: CPT | Performed by: INTERNAL MEDICINE

## 2022-10-24 ENCOUNTER — HOSPITAL ENCOUNTER (OUTPATIENT)
Dept: RESPIRATORY THERAPY | Facility: HOSPITAL | Age: 62
Discharge: HOME | End: 2022-10-24
Attending: INTERNAL MEDICINE
Payer: MEDICAID

## 2022-10-24 DIAGNOSIS — R06.02 SHORTNESS OF BREATH: ICD-10-CM

## 2022-10-24 PROCEDURE — 94060 EVALUATION OF WHEEZING: CPT

## 2022-10-24 PROCEDURE — 94729 DIFFUSING CAPACITY: CPT

## 2022-10-24 PROCEDURE — 94726 PLETHYSMOGRAPHY LUNG VOLUMES: CPT

## 2022-10-26 ENCOUNTER — OFFICE VISIT (OUTPATIENT)
Dept: DERMATOLOGY CLINIC | Facility: CLINIC | Age: 62
End: 2022-10-26
Payer: MEDICAID

## 2022-10-26 DIAGNOSIS — D23.9 BENIGN NEOPLASM OF SKIN, UNSPECIFIED LOCATION: ICD-10-CM

## 2022-10-26 DIAGNOSIS — D03.59 MALIGNANT MELANOMA IN SITU OF SKIN OF ANTERIOR CHEST (HCC): ICD-10-CM

## 2022-10-26 DIAGNOSIS — L82.1 SEBORRHEIC KERATOSES: ICD-10-CM

## 2022-10-26 DIAGNOSIS — L81.4 LENTIGO: ICD-10-CM

## 2022-10-26 DIAGNOSIS — D22.9 MULTIPLE NEVI: ICD-10-CM

## 2022-10-26 DIAGNOSIS — D48.5 NEOPLASM OF UNCERTAIN BEHAVIOR OF SKIN: Primary | ICD-10-CM

## 2022-10-26 PROCEDURE — 88305 TISSUE EXAM BY PATHOLOGIST: CPT | Performed by: DERMATOLOGY

## 2022-10-26 PROCEDURE — 99214 OFFICE O/P EST MOD 30 MIN: CPT | Performed by: DERMATOLOGY

## 2022-10-26 PROCEDURE — 11102 TANGNTL BX SKIN SINGLE LES: CPT | Performed by: DERMATOLOGY

## 2022-11-01 RX ORDER — ALPRAZOLAM 0.5 MG/1
0.5 TABLET ORAL 2 TIMES DAILY PRN
Qty: 60 TABLET | Refills: 0 | Status: SHIPPED | OUTPATIENT
Start: 2022-11-01

## 2022-11-01 NOTE — TELEPHONE ENCOUNTER
Please review; Protocol Failed / No protocol. Requested Prescriptions   Pending Prescriptions Disp Refills    ALPRAZOLAM 0.5 MG Oral Tab [Pharmacy Med Name: Alprazolam 0.5 Mg Tab Acta] 60 tablet 0     Sig: Take 1 tablet (0.5 mg total) by mouth 2 (two) times daily as needed for Anxiety.        There is no refill protocol information for this order           Recent Outpatient Visits              6 days ago Neoplasm of uncertain behavior of skin    [facility] Abby Cordova MD    Office Visit    1 week ago Neck fullness    [facility] Lizzy Garrison MD    Office Visit    1 month ago Annual physical exam    Thony Sofia MD    Office Visit    2 months ago Melanoma in situ of breast, unspecified laterality [facility])    [address] [facility] Ciarra Baca MD    Office Visit    3 months ago Melanoma in situ of breast, unspecified laterality [facility])    [address] [facility] Ciarra Baca MD    Office Visit             Future Appointments         Provider Department Appt Notes    [place] [address] [address] [facility] thyroid concern ???    In 2 months Devorah Cortez MD [facility], [address] I need to get pap smear screening, on Anastrozole for previous breast cancer diagnosis    In 4 months Marlene Cox MD [address] [facility], [facility]     In 11 months Jonatan Michaels MD [facility], [address], [place] annual physical

## 2022-11-02 ENCOUNTER — TELEPHONE (OUTPATIENT)
Dept: INTERNAL MEDICINE CLINIC | Facility: CLINIC | Age: 62
End: 2022-11-02

## 2022-11-02 DIAGNOSIS — R06.02 SHORTNESS OF BREATH: Primary | ICD-10-CM

## 2022-11-07 NOTE — PROGRESS NOTES
Operative Report                     Shave/  Tangential biopsy     Clinical diagnosis:    Size of lesion:    Location:pt with irregular riddhi examule  Spec 1 Description >>>>>: right mid back  Spec 1 Comment: r/o atypical nevus 4mm irragular brown macule with irregular border    Procedure: With patient in appropriate position the skin of the above was scrubbed with alcohol. Anesthesia was obtained with 1% Xylocaine with epinephrine. The skin surrounding the lesion was placed under tension and the lesion was incised using a #15 scalpel blade. The specimen was sent for histopathologic exam.    Hemostasis was obtained with electrocautery/aluminum chloride. Estimated blood loss less than 2 cc. Biopsy dressed with Polysporin, bandage. Pressure dressing:   No    Complications: None    Written instructions given and reviewed with patient    Await pathology    Contact information reviewed.     Procedural physician:  Maria Alejandra Samayoa MD

## 2022-11-14 ENCOUNTER — HOSPITAL ENCOUNTER (OUTPATIENT)
Dept: CV DIAGNOSTICS | Age: 62
Discharge: HOME | End: 2022-11-14
Attending: INTERNAL MEDICINE
Payer: MEDICAID

## 2022-11-14 DIAGNOSIS — R06.02 SHORTNESS OF BREATH: ICD-10-CM

## 2022-11-14 PROCEDURE — 93306 TTE W/DOPPLER COMPLETE: CPT | Performed by: INTERNAL MEDICINE

## 2022-11-19 ENCOUNTER — TELEPHONE (OUTPATIENT)
Dept: INTERNAL MEDICINE CLINIC | Facility: CLINIC | Age: 62
End: 2022-11-19

## 2022-11-19 DIAGNOSIS — R93.1 ABNORMAL ECHOCARDIOGRAM: Primary | ICD-10-CM

## 2022-11-22 ENCOUNTER — HOSPITAL ENCOUNTER (OUTPATIENT)
Dept: ULTRASOUND IMAGING | Age: 62
Discharge: HOME | End: 2022-11-22
Attending: INTERNAL MEDICINE
Payer: MEDICAID

## 2022-11-22 DIAGNOSIS — R22.1 NECK FULLNESS: ICD-10-CM

## 2022-11-22 PROCEDURE — 76536 US EXAM OF HEAD AND NECK: CPT | Performed by: INTERNAL MEDICINE

## 2022-11-26 ENCOUNTER — MED REC SCAN ONLY (OUTPATIENT)
Dept: INTERNAL MEDICINE CLINIC | Facility: CLINIC | Age: 62
End: 2022-11-26

## 2022-12-05 ENCOUNTER — OFFICE VISIT (OUTPATIENT)
Dept: ORTHOPEDICS CLINIC | Facility: CLINIC | Age: 62
End: 2022-12-05
Payer: MEDICAID

## 2022-12-05 ENCOUNTER — HOSPITAL ENCOUNTER (OUTPATIENT)
Dept: GENERAL RADIOLOGY | Age: 62
Discharge: HOME | End: 2022-12-05
Attending: PHYSICIAN ASSISTANT
Payer: MEDICAID

## 2022-12-05 VITALS — BODY MASS INDEX: 25.76 KG/M2 | WEIGHT: 170 LBS | HEIGHT: 68 IN

## 2022-12-05 DIAGNOSIS — M25.572 LEFT ANKLE PAIN, UNSPECIFIED CHRONICITY: ICD-10-CM

## 2022-12-05 DIAGNOSIS — S82.892A CLOSED FRACTURE OF LEFT ANKLE, INITIAL ENCOUNTER: Primary | ICD-10-CM

## 2022-12-05 PROCEDURE — 73610 X-RAY EXAM OF ANKLE: CPT | Performed by: PHYSICIAN ASSISTANT

## 2022-12-12 ENCOUNTER — HOSPITAL ENCOUNTER (OUTPATIENT)
Dept: GENERAL RADIOLOGY | Age: 62
Discharge: HOME | End: 2022-12-12
Attending: PHYSICIAN ASSISTANT
Payer: MEDICAID

## 2022-12-12 ENCOUNTER — TELEPHONE (OUTPATIENT)
Dept: ENDOCRINOLOGY CLINIC | Facility: CLINIC | Age: 62
End: 2022-12-12

## 2022-12-12 ENCOUNTER — OFFICE VISIT (OUTPATIENT)
Dept: ORTHOPEDICS CLINIC | Facility: CLINIC | Age: 62
End: 2022-12-12
Payer: MEDICAID

## 2022-12-12 DIAGNOSIS — S82.892D CLOSED FRACTURE OF LEFT ANKLE WITH ROUTINE HEALING, SUBSEQUENT ENCOUNTER: Primary | ICD-10-CM

## 2022-12-12 DIAGNOSIS — S82.892A CLOSED FRACTURE OF LEFT ANKLE, INITIAL ENCOUNTER: ICD-10-CM

## 2022-12-12 PROCEDURE — 73610 X-RAY EXAM OF ANKLE: CPT | Performed by: PHYSICIAN ASSISTANT

## 2022-12-13 NOTE — TELEPHONE ENCOUNTER
Head neck us: no mass/ fluid in the area of palpable abnormality  Discussed that thyroid was not imaged  Patient states that the main concern was the \" bump\" but she is seeing her oncologist next week  If she wants her thyroid to be imaged, patient will call me and I can order that for her   Thanks

## 2022-12-15 RX ORDER — ALPRAZOLAM 0.5 MG/1
TABLET ORAL
Qty: 60 TABLET | Refills: 0 | Status: SHIPPED | OUTPATIENT
Start: 2022-12-15

## 2023-01-06 ENCOUNTER — OFFICE VISIT (OUTPATIENT)
Dept: OBGYN CLINIC | Facility: CLINIC | Age: 63
End: 2023-01-06
Payer: MEDICAID

## 2023-01-06 VITALS — HEART RATE: 90 BPM | DIASTOLIC BLOOD PRESSURE: 82 MMHG | SYSTOLIC BLOOD PRESSURE: 129 MMHG

## 2023-01-06 DIAGNOSIS — Z01.419 ENCOUNTER FOR GYNECOLOGICAL EXAMINATION WITHOUT ABNORMAL FINDING: Primary | ICD-10-CM

## 2023-01-06 DIAGNOSIS — Z12.4 SCREENING FOR MALIGNANT NEOPLASM OF CERVIX: ICD-10-CM

## 2023-01-06 PROCEDURE — 3074F SYST BP LT 130 MM HG: CPT | Performed by: OBSTETRICS & GYNECOLOGY

## 2023-01-06 PROCEDURE — 99396 PREV VISIT EST AGE 40-64: CPT | Performed by: OBSTETRICS & GYNECOLOGY

## 2023-01-06 PROCEDURE — 3079F DIAST BP 80-89 MM HG: CPT | Performed by: OBSTETRICS & GYNECOLOGY

## 2023-01-09 ENCOUNTER — HOSPITAL ENCOUNTER (OUTPATIENT)
Dept: GENERAL RADIOLOGY | Age: 63
Discharge: HOME | End: 2023-01-09
Attending: PHYSICIAN ASSISTANT
Payer: MEDICAID

## 2023-01-09 ENCOUNTER — PATIENT MESSAGE (OUTPATIENT)
Dept: ORTHOPEDICS CLINIC | Facility: CLINIC | Age: 63
End: 2023-01-09

## 2023-01-09 ENCOUNTER — OFFICE VISIT (OUTPATIENT)
Dept: ORTHOPEDICS CLINIC | Facility: CLINIC | Age: 63
End: 2023-01-09
Payer: MEDICAID

## 2023-01-09 ENCOUNTER — TELEPHONE (OUTPATIENT)
Dept: PHYSICAL THERAPY | Facility: HOSPITAL | Age: 63
End: 2023-01-09

## 2023-01-09 DIAGNOSIS — S82.892D CLOSED FRACTURE OF LEFT ANKLE WITH ROUTINE HEALING, SUBSEQUENT ENCOUNTER: Primary | ICD-10-CM

## 2023-01-09 DIAGNOSIS — S82.892D CLOSED FRACTURE OF LEFT ANKLE WITH ROUTINE HEALING, SUBSEQUENT ENCOUNTER: ICD-10-CM

## 2023-01-09 LAB — HPV I/H RISK 1 DNA SPEC QL NAA+PROBE: NEGATIVE

## 2023-01-09 PROCEDURE — 73610 X-RAY EXAM OF ANKLE: CPT | Performed by: PHYSICIAN ASSISTANT

## 2023-01-09 PROCEDURE — 99213 OFFICE O/P EST LOW 20 MIN: CPT | Performed by: PHYSICIAN ASSISTANT

## 2023-01-09 NOTE — TELEPHONE ENCOUNTER
Alfie Gómez, please offer Corbin Latin the following appts:  1/23 = 11:30  1/27 = 9:15  2/1 = 11:30  2/3 = 9:15  2/6 = 10:00  2/8 = 10:00    Thanks, [employer]

## 2023-01-10 NOTE — TELEPHONE ENCOUNTER
From: Stephania Camejo  To: David Rebolledo  Sent: 1/9/2023 5:10 PM CST  Subject: Norma Fan, just need clarification regarding ankle. So, you said on 1/23 no more crutches, does that mean no crutches or air cast or just no crutches and keep wearing air cast?   Called to start therapy today too, waiting for schedule.     [employer]

## 2023-01-17 ENCOUNTER — PATIENT MESSAGE (OUTPATIENT)
Dept: OTHER | Age: 63
End: 2023-01-17

## 2023-01-17 ENCOUNTER — TELEPHONE (OUTPATIENT)
Dept: INTERNAL MEDICINE CLINIC | Facility: CLINIC | Age: 63
End: 2023-01-17

## 2023-01-17 NOTE — TELEPHONE ENCOUNTER
Requested Problem Removals Date Noted Reported By  Comments   Vulvar dermatitis 4/22/2015 Allie Rodriguez done   Vaginitis 4/22/2015 Allie Rodriguez done   Candidal skin infection 6/22/2015 Allie Rodriguez done   Allergic rhinitis due to pollen 7/21/2014 Allie Rodriguez done   Allergic rhinitis 12/13/2014 Allie Rodriguez Old - from 2014   Poor posture 10/1/2019 Allie Rodriguez

## 2023-01-17 NOTE — TELEPHONE ENCOUNTER
From: Samir Cain  To: Samantha Diaz  Sent: 1/17/2023 10:55 AM CST  Subject: problem list    Hello,  Thank you for reaching out to our office. It is appreciated that you are willing to take an active role in managing your health and your medical record. Changes to your problem list would best be addressed during a face to face with your provider. Please ask your provider about this issue at your next office visit. Thank you again for using [employer].

## 2023-01-23 ENCOUNTER — OFFICE VISIT (OUTPATIENT)
Dept: PHYSICAL THERAPY | Age: 63
End: 2023-01-23
Attending: PHYSICIAN ASSISTANT
Payer: MEDICAID

## 2023-01-23 DIAGNOSIS — S82.892D CLOSED FRACTURE OF LEFT ANKLE WITH ROUTINE HEALING, SUBSEQUENT ENCOUNTER: ICD-10-CM

## 2023-01-23 PROCEDURE — 97530 THERAPEUTIC ACTIVITIES: CPT

## 2023-01-23 PROCEDURE — 97110 THERAPEUTIC EXERCISES: CPT

## 2023-01-23 PROCEDURE — 97161 PT EVAL LOW COMPLEX 20 MIN: CPT

## 2023-01-24 RX ORDER — METOPROLOL SUCCINATE AND HYDROCHLOROTHIAZIDE 12.5; 1 MG/1; MG/1
TABLET ORAL
COMMUNITY
Start: 2022-10-20

## 2023-01-25 ENCOUNTER — PATIENT MESSAGE (OUTPATIENT)
Dept: PULMONOLOGY | Facility: CLINIC | Age: 63
End: 2023-01-25

## 2023-01-25 ENCOUNTER — TELEPHONE (OUTPATIENT)
Dept: PULMONOLOGY | Facility: CLINIC | Age: 63
End: 2023-01-25

## 2023-01-25 NOTE — TELEPHONE ENCOUNTER
Dr. Diaz Dress- please see below message from patient. She states she does not snore anymore and is asking if she must continue CPAP or if oral appliance referral would be appropriate.

## 2023-01-26 ENCOUNTER — OFFICE VISIT (OUTPATIENT)
Dept: PULMONOLOGY | Facility: CLINIC | Age: 63
End: 2023-01-26

## 2023-01-26 VITALS
RESPIRATION RATE: 14 BRPM | OXYGEN SATURATION: 98 % | DIASTOLIC BLOOD PRESSURE: 79 MMHG | BODY MASS INDEX: 25.76 KG/M2 | HEART RATE: 73 BPM | HEIGHT: 68 IN | SYSTOLIC BLOOD PRESSURE: 120 MMHG | WEIGHT: 170 LBS

## 2023-01-26 DIAGNOSIS — G47.33 OSA (OBSTRUCTIVE SLEEP APNEA): Primary | ICD-10-CM

## 2023-01-26 PROCEDURE — 3074F SYST BP LT 130 MM HG: CPT | Performed by: INTERNAL MEDICINE

## 2023-01-26 PROCEDURE — 3008F BODY MASS INDEX DOCD: CPT | Performed by: INTERNAL MEDICINE

## 2023-01-26 PROCEDURE — 99213 OFFICE O/P EST LOW 20 MIN: CPT | Performed by: INTERNAL MEDICINE

## 2023-01-26 PROCEDURE — 3078F DIAST BP <80 MM HG: CPT | Performed by: INTERNAL MEDICINE

## 2023-01-26 NOTE — TELEPHONE ENCOUNTER
I see she is seeing me in the office tomorrow I would be glad to discuss this during the appointment.

## 2023-01-26 NOTE — PROGRESS NOTES
Referring Physician  Cesilia Mendez MD    History of Present Illness  Patient presents today for follow-up visit to pulmonary clinic. Using CPAP device on most nights with some improvement in hypersomnia. Uses her device but does not tolerate it well overall. Denies significant dyspnea symptoms. Inquiring about other treatment options for sleep apnea. Medications  Metoprolol-HCTZ -12.5 MG Oral Tablet 24 Hr, , Disp: , Rfl:   Lisinopril 1 MG/ML Oral Solution, , Disp: , Rfl:   ALPRAZOLAM 0.5 MG Oral Tab, TAKE 1 TABLET (0.5MG TOTAL) BY MOUTH TWO TIMES DAILY AS NEEDED FOR ANXIETY, Disp: 60 tablet, Rfl: 0  Azelastine HCl 137 MCG/SPRAY Nasal Solution, 2 sprays by Nasal route 2 (two) times daily. , Disp: 30 mL, Rfl: 5  PATADAY 0.7 % Ophthalmic Solution, INSTILL 1 DROP INTO BOTH EYES ONCE A DAY AS NEEDED FOR ALLERGY RELIEF, Disp: , Rfl:   anastrozole 1 MG Oral Tab tab, Take 1 mg by mouth daily. , Disp: , Rfl:   Probiotic Product (ALIGN OR), Take by mouth., Disp: , Rfl:     No current facility-administered medications on file prior to visit. Allergies    Banana                  HIVES  Cantaloupe              HIVES  Cephalosporins          HIVES  Sulfamethoxazole        HIVES  Trimethoprim            HIVES  Milk-Related Compou*    PAIN    Comment:Stomach pain, diarrhea  Dust Mite Extract       ITCHING  Penicillins             HIVES, RASH    Physical Exam  Constitutional: no acute distress  HEENT: PERRL  Neck: supple, no JVD  Cardio: RRR, S1 S2  Respiratory: clear to auscultation bilaterally, no wheezing, rales, rhonchi, crackles  GI: abdomen soft, non tender, active bowel sounds, no organomegaly  Extremities: no clubbing, cyanosis, edema  Neurologic: no gross motor deficits  Skin: warm, dry  Lymphatic: no supraclavicular lymphadenopathy     Assessment  1. Severe LAURY    Plan  -Patient presents today for management of underlying sleep apnea.   I reviewed her download data over the last 127 days with usage 87% of days and average usage of 5 hours and 26 minutes. AHI is 7.0. Given severity of her initial sleep apnea is seen on polysomnography recommend ongoing use of CPAP therapy versus potential surgery or dental device. May consider possibility of inspire device if she is unable to tolerate CPAP long-term.     Jai Valdovinos DO  Pulmonary Medicine  [facility], [facility]  1/26/2023  2:13 PM

## 2023-01-27 ENCOUNTER — OFFICE VISIT (OUTPATIENT)
Dept: PODIATRY CLINIC | Facility: CLINIC | Age: 63
End: 2023-01-27

## 2023-01-27 ENCOUNTER — OFFICE VISIT (OUTPATIENT)
Dept: PHYSICAL THERAPY | Age: 63
End: 2023-01-27
Attending: PHYSICIAN ASSISTANT
Payer: MEDICAID

## 2023-01-27 DIAGNOSIS — B35.1 ONYCHOMYCOSIS: Primary | ICD-10-CM

## 2023-01-27 PROCEDURE — 97110 THERAPEUTIC EXERCISES: CPT

## 2023-01-27 PROCEDURE — 97140 MANUAL THERAPY 1/> REGIONS: CPT

## 2023-01-27 PROCEDURE — 99213 OFFICE O/P EST LOW 20 MIN: CPT | Performed by: STUDENT IN AN ORGANIZED HEALTH CARE EDUCATION/TRAINING PROGRAM

## 2023-01-27 RX ORDER — EFINACONAZOLE 100 MG/ML
1 SOLUTION TOPICAL
Qty: 8 ML | Refills: 3 | Status: SHIPPED | OUTPATIENT
Start: 2023-01-27

## 2023-01-30 ENCOUNTER — OFFICE VISIT (OUTPATIENT)
Dept: PODIATRY CLINIC | Facility: CLINIC | Age: 63
End: 2023-01-30

## 2023-01-30 DIAGNOSIS — B35.1 ONYCHOMYCOSIS: Primary | ICD-10-CM

## 2023-01-30 PROCEDURE — 11755 BIOPSY NAIL UNIT: CPT | Performed by: STUDENT IN AN ORGANIZED HEALTH CARE EDUCATION/TRAINING PROGRAM

## 2023-02-01 ENCOUNTER — OFFICE VISIT (OUTPATIENT)
Dept: PHYSICAL THERAPY | Age: 63
End: 2023-02-01
Attending: PHYSICIAN ASSISTANT
Payer: MEDICAID

## 2023-02-01 PROCEDURE — 97110 THERAPEUTIC EXERCISES: CPT

## 2023-02-01 PROCEDURE — 97140 MANUAL THERAPY 1/> REGIONS: CPT

## 2023-02-03 ENCOUNTER — OFFICE VISIT (OUTPATIENT)
Dept: PHYSICAL THERAPY | Age: 63
End: 2023-02-03
Attending: PHYSICIAN ASSISTANT
Payer: MEDICAID

## 2023-02-03 PROCEDURE — 97110 THERAPEUTIC EXERCISES: CPT

## 2023-02-03 PROCEDURE — 97140 MANUAL THERAPY 1/> REGIONS: CPT

## 2023-02-06 ENCOUNTER — APPOINTMENT (OUTPATIENT)
Dept: PHYSICAL THERAPY | Age: 63
End: 2023-02-06
Attending: PHYSICIAN ASSISTANT
Payer: MEDICAID

## 2023-02-08 ENCOUNTER — OFFICE VISIT (OUTPATIENT)
Dept: PHYSICAL THERAPY | Age: 63
End: 2023-02-08
Attending: PHYSICIAN ASSISTANT
Payer: MEDICAID

## 2023-02-08 PROCEDURE — 97110 THERAPEUTIC EXERCISES: CPT

## 2023-02-08 PROCEDURE — 97112 NEUROMUSCULAR REEDUCATION: CPT

## 2023-02-08 PROCEDURE — 97140 MANUAL THERAPY 1/> REGIONS: CPT

## 2023-02-09 ENCOUNTER — TELEPHONE (OUTPATIENT)
Dept: ORTHOPEDICS CLINIC | Facility: CLINIC | Age: 63
End: 2023-02-09

## 2023-02-09 ENCOUNTER — OFFICE VISIT (OUTPATIENT)
Dept: PHYSICAL THERAPY | Age: 63
End: 2023-02-09
Attending: PHYSICIAN ASSISTANT
Payer: MEDICAID

## 2023-02-09 DIAGNOSIS — S82.892D CLOSED FRACTURE OF LEFT ANKLE WITH ROUTINE HEALING, SUBSEQUENT ENCOUNTER: Primary | ICD-10-CM

## 2023-02-09 PROCEDURE — 97112 NEUROMUSCULAR REEDUCATION: CPT

## 2023-02-09 PROCEDURE — 97110 THERAPEUTIC EXERCISES: CPT

## 2023-02-09 PROCEDURE — 97140 MANUAL THERAPY 1/> REGIONS: CPT

## 2023-02-10 ENCOUNTER — HOSPITAL ENCOUNTER (OUTPATIENT)
Dept: GENERAL RADIOLOGY | Age: 63
Discharge: HOME | End: 2023-02-10
Attending: PHYSICIAN ASSISTANT
Payer: MEDICAID

## 2023-02-10 ENCOUNTER — OFFICE VISIT (OUTPATIENT)
Dept: ORTHOPEDICS CLINIC | Facility: CLINIC | Age: 63
End: 2023-02-10
Payer: MEDICAID

## 2023-02-10 DIAGNOSIS — S82.892D CLOSED FRACTURE OF LEFT ANKLE WITH ROUTINE HEALING, SUBSEQUENT ENCOUNTER: ICD-10-CM

## 2023-02-10 DIAGNOSIS — S82.892D CLOSED FRACTURE OF LEFT ANKLE WITH ROUTINE HEALING, SUBSEQUENT ENCOUNTER: Primary | ICD-10-CM

## 2023-02-10 PROCEDURE — 73610 X-RAY EXAM OF ANKLE: CPT | Performed by: PHYSICIAN ASSISTANT

## 2023-02-10 PROCEDURE — 99214 OFFICE O/P EST MOD 30 MIN: CPT | Performed by: PHYSICIAN ASSISTANT

## 2023-03-27 ENCOUNTER — OFFICE VISIT (OUTPATIENT)
Dept: SURGERY | Facility: CLINIC | Age: 63
End: 2023-03-27
Payer: MEDICAID

## 2023-03-27 VITALS
DIASTOLIC BLOOD PRESSURE: 70 MMHG | WEIGHT: 188 LBS | BODY MASS INDEX: 28.49 KG/M2 | SYSTOLIC BLOOD PRESSURE: 110 MMHG | HEART RATE: 68 BPM | HEIGHT: 68 IN | OXYGEN SATURATION: 99 %

## 2023-03-27 DIAGNOSIS — E66.3 OVERWEIGHT (BMI 25.0-29.9): ICD-10-CM

## 2023-03-27 DIAGNOSIS — I10 HTN (HYPERTENSION), BENIGN: Primary | ICD-10-CM

## 2023-03-27 DIAGNOSIS — E78.5 DYSLIPIDEMIA: ICD-10-CM

## 2023-03-27 DIAGNOSIS — G47.33 OSA ON CPAP: ICD-10-CM

## 2023-03-27 DIAGNOSIS — Z99.89 OSA ON CPAP: ICD-10-CM

## 2023-03-27 PROCEDURE — 3074F SYST BP LT 130 MM HG: CPT | Performed by: INTERNAL MEDICINE

## 2023-03-27 PROCEDURE — 3078F DIAST BP <80 MM HG: CPT | Performed by: INTERNAL MEDICINE

## 2023-03-27 PROCEDURE — 3008F BODY MASS INDEX DOCD: CPT | Performed by: INTERNAL MEDICINE

## 2023-03-27 PROCEDURE — 99245 OFF/OP CONSLTJ NEW/EST HI 55: CPT | Performed by: INTERNAL MEDICINE

## 2023-03-27 RX ORDER — DIETHYLPROPION HYDROCHLORIDE 25 MG/1
1 TABLET ORAL DAILY
Qty: 30 TABLET | Refills: 2 | Status: SHIPPED | OUTPATIENT
Start: 2023-03-27 | End: 2023-04-26

## 2023-03-28 ENCOUNTER — TELEPHONE (OUTPATIENT)
Dept: PULMONOLOGY | Facility: CLINIC | Age: 63
End: 2023-03-28

## 2023-03-28 NOTE — TELEPHONE ENCOUNTER
Received visit notes from Dr. Laith Orosco office at [address] in Dr. Nimesh Nicole folder for review.

## 2023-04-06 ENCOUNTER — HOSPITAL ENCOUNTER (OUTPATIENT)
Dept: GENERAL RADIOLOGY | Facility: HOSPITAL | Age: 63
Discharge: HOME | End: 2023-04-06
Attending: PHYSICAL MEDICINE & REHABILITATION
Payer: MEDICAID

## 2023-04-06 ENCOUNTER — OFFICE VISIT (OUTPATIENT)
Dept: PHYSICAL MEDICINE AND REHAB | Facility: CLINIC | Age: 63
End: 2023-04-06
Payer: MEDICAID

## 2023-04-06 ENCOUNTER — TELEPHONE (OUTPATIENT)
Dept: PHYSICAL MEDICINE AND REHAB | Facility: CLINIC | Age: 63
End: 2023-04-06

## 2023-04-06 VITALS
SYSTOLIC BLOOD PRESSURE: 112 MMHG | BODY MASS INDEX: 27.28 KG/M2 | HEIGHT: 68 IN | OXYGEN SATURATION: 97 % | DIASTOLIC BLOOD PRESSURE: 74 MMHG | HEART RATE: 85 BPM | WEIGHT: 180 LBS

## 2023-04-06 DIAGNOSIS — M17.0 PRIMARY OSTEOARTHRITIS OF KNEES, BILATERAL: Primary | ICD-10-CM

## 2023-04-06 DIAGNOSIS — M17.0 PRIMARY OSTEOARTHRITIS OF KNEES, BILATERAL: ICD-10-CM

## 2023-04-06 PROCEDURE — 3078F DIAST BP <80 MM HG: CPT | Performed by: PHYSICAL MEDICINE & REHABILITATION

## 2023-04-06 PROCEDURE — 73564 X-RAY EXAM KNEE 4 OR MORE: CPT | Performed by: PHYSICAL MEDICINE & REHABILITATION

## 2023-04-06 PROCEDURE — 3008F BODY MASS INDEX DOCD: CPT | Performed by: PHYSICAL MEDICINE & REHABILITATION

## 2023-04-06 PROCEDURE — 3074F SYST BP LT 130 MM HG: CPT | Performed by: PHYSICAL MEDICINE & REHABILITATION

## 2023-04-06 PROCEDURE — 99214 OFFICE O/P EST MOD 30 MIN: CPT | Performed by: PHYSICAL MEDICINE & REHABILITATION

## 2023-04-06 NOTE — TELEPHONE ENCOUNTER
Per front office staff, patient has Medicare Part A only    Per Medicaid guidelines-authorization is not required for Ultrasound guided right knee aspiration and injection with Gel One   Viscosupplementation with Hyaluronans will only be covered for Osteoarthritis of the knee or shoulder joint when radiological evidence to support the diagnosis of osteoarthritis; and there is adequate documentation that simple pharmacologic therapy (ie aspirin), or exercise and physical therapy has been tried and the patient has failed to respond satisfactorily.  FYI-Synvisc One is limited to Osteoarthritis of the knee    Status: Authorization is not required however may be subject to review once claim is submitted-Covered Benefit (Buy&Bill)    Patient scheduled 4/20/23 at 10:20a

## 2023-04-06 NOTE — TELEPHONE ENCOUNTER
Per Medicaid eligibility line patient is eligible for coverage until 4/30/23 checked on a month to month basis however, patient has Medicare primary    Routing to front office staff to obtain updated Primary Medicare Insurance  Will initiate authorization for Ultrasound guided right knee aspiration and injection with Gel One CPT 34894,  once insurance is verified

## 2023-04-10 ENCOUNTER — OFFICE VISIT (OUTPATIENT)
Dept: DERMATOLOGY CLINIC | Facility: CLINIC | Age: 63
End: 2023-04-10

## 2023-04-10 DIAGNOSIS — L82.1 SEBORRHEIC KERATOSES: ICD-10-CM

## 2023-04-10 DIAGNOSIS — D22.9 MULTIPLE NEVI: Primary | ICD-10-CM

## 2023-04-10 DIAGNOSIS — D03.59 MALIGNANT MELANOMA IN SITU OF SKIN OF ANTERIOR CHEST (HCC): ICD-10-CM

## 2023-04-10 DIAGNOSIS — D23.9 BENIGN NEOPLASM OF SKIN, UNSPECIFIED LOCATION: ICD-10-CM

## 2023-04-10 DIAGNOSIS — L81.4 LENTIGO: ICD-10-CM

## 2023-04-10 PROCEDURE — 99213 OFFICE O/P EST LOW 20 MIN: CPT | Performed by: DERMATOLOGY

## 2023-04-10 RX ORDER — LISINOPRIL 10 MG/1
10 TABLET ORAL DAILY
COMMUNITY
Start: 2023-03-06

## 2023-04-10 RX ORDER — MONTELUKAST SODIUM 10 MG/1
1 TABLET ORAL DAILY
COMMUNITY
Start: 2022-11-14

## 2023-04-10 RX ORDER — METOPROLOL SUCCINATE 100 MG/1
100 TABLET, EXTENDED RELEASE ORAL DAILY
COMMUNITY
Start: 2023-03-06

## 2023-04-10 RX ORDER — METOPROLOL SUCCINATE 50 MG/1
50 TABLET, EXTENDED RELEASE ORAL DAILY
COMMUNITY
Start: 2022-11-14

## 2023-04-10 RX ORDER — CETIRIZINE HYDROCHLORIDE 10 MG/1
CAPSULE, LIQUID FILLED ORAL
COMMUNITY
Start: 2021-06-13

## 2023-04-10 RX ORDER — METOPROLOL SUCCINATE 100 MG/1
1 TABLET, EXTENDED RELEASE ORAL DAILY
COMMUNITY
Start: 2022-11-21

## 2023-04-10 RX ORDER — PANTOPRAZOLE SODIUM 40 MG/1
40 TABLET, DELAYED RELEASE ORAL DAILY
COMMUNITY
Start: 2023-03-06

## 2023-04-17 RX ORDER — ALPRAZOLAM 0.5 MG/1
0.5 TABLET ORAL 2 TIMES DAILY PRN
Qty: 60 TABLET | Refills: 0 | Status: SHIPPED | OUTPATIENT
Start: 2023-04-17

## 2023-04-20 ENCOUNTER — OFFICE VISIT (OUTPATIENT)
Dept: PHYSICAL MEDICINE AND REHAB | Facility: CLINIC | Age: 63
End: 2023-04-20
Payer: MEDICAID

## 2023-04-20 DIAGNOSIS — M17.0 PRIMARY OSTEOARTHRITIS OF KNEES, BILATERAL: Primary | ICD-10-CM

## 2023-04-20 PROCEDURE — 20606 DRAIN/INJ JOINT/BURSA W/US: CPT | Performed by: PHYSICAL MEDICINE & REHABILITATION

## 2023-04-20 RX ORDER — LIDOCAINE HYDROCHLORIDE 10 MG/ML
3 INJECTION, SOLUTION INFILTRATION; PERINEURAL ONCE
Status: COMPLETED | OUTPATIENT
Start: 2023-04-20 | End: 2023-04-20

## 2023-04-20 NOTE — PROCEDURES
Procedure:  Ultrasound guided RIGHT KNEE aspiration and injection with corticosteroid  The risks, benefits and anticipated outcomes of the procedure, the risks and benefits of the alternatives to the procedure, and the roles and tasks of the personnel to be involved, were discussed with the patient, and the patient consents to the procedure and agrees to proceed. UNIVERSAL PROTOCOL / SAFETY CHECKLIST  Sign in Communication: Completed  Time Out:  Team Confirms the Correct Patient, Correct Procedure, Correct Site and Site Marking, Correct Position (if applicable), Prep and Dry Time (if applicable). The procedure was carried out under sterile prep with  with sterile gel. A 27 ga 1&1/4in needle was introduced and advanced with ultrasound guidance for skin anesthesia with 3 cc of 1% lidocaine. Then, again with real time ultrasound guidance, a 18 gauge 1.5 in needle was advanced with the transducer in transverse orientation using an in plane approach from medial to lateral direction where the needle tip was visualized entering the suprapatellar bursa and 22 synovial fluid was aspirated. Following aspiration, Gel One was injected. Needle was withdrawn without any complications. Permanent pictures were taken and stored in the PACS system. Ultrasound interpretation was performed prior to the procedure to identify the target for injection and any adjacent neurovascular structures. Subsequently, interpretation was performed during real-time needle guidance confirming placement of the needle at the target. Post-intervention interpretation was also performed confirming appropriate injectate flow and hemostasis. The patient tolerated the procedure without complication and was instructed in post-procedure precautions. See patient instructions.     Debbie Carrillo DO, FAAPMR & CAQSM  Physical Medicine and Rehabilitation/Sports Medicine  MEDICAL CENTER [facility]

## 2023-05-22 DIAGNOSIS — E66.3 OVERWEIGHT (BMI 25.0-29.9): ICD-10-CM

## 2023-05-22 RX ORDER — DIETHYLPROPION HYDROCHLORIDE 25 MG/1
1 TABLET ORAL 2 TIMES DAILY
Qty: 60 TABLET | Refills: 2 | Status: SHIPPED | OUTPATIENT
Start: 2023-05-22 | End: 2023-06-21

## 2023-05-23 RX ORDER — ALPRAZOLAM 0.5 MG/1
0.5 TABLET ORAL 2 TIMES DAILY PRN
Qty: 60 TABLET | Refills: 0 | Status: SHIPPED | OUTPATIENT
Start: 2023-05-23

## 2023-05-23 NOTE — TELEPHONE ENCOUNTER
Please review. Protocol Failed or has No Protocol.     Requested Prescriptions   Pending Prescriptions Disp Refills    ALPRAZOLAM 0.5 MG Oral Tab [Pharmacy Med Name: Alprazolam 0.5 Mg Tab Acta] 60 tablet 0     Sig: Take 1 tablet (0.5 mg total) by mouth 2 (two) times daily as needed for Anxiety       There is no refill protocol information for this order          Recent Outpatient Visits              1 month ago Primary osteoarthritis of knees, bilateral    [facility], [address], [place], [place]    Office Visit    1 month ago Multiple nevi    Laurie Harvey MD    Office Visit    1 month ago Primary osteoarthritis of knees, bilateral    [facility], [address], [place], [place]    Office Visit    1 month ago HTN (hypertension), benign    Ad Saavedra MD    Office Visit    3 months ago Closed fracture of left ankle with routine healing, subsequent encounter    [address], [address]    Office Visit            Future Appointments         Provider Department Appt Notes    In 1 month Bryan Guardado MD [address], [address], [facility] [place]     In 4 months Thiago Ascencio MD [address] [facility] annual physical

## 2023-06-15 ENCOUNTER — PATIENT MESSAGE (OUTPATIENT)
Dept: INTERNAL MEDICINE CLINIC | Facility: CLINIC | Age: 63
End: 2023-06-15

## 2023-06-15 LAB — AMB EXT COVID-19 RESULT: DETECTED

## 2023-06-16 ENCOUNTER — TELEMEDICINE (OUTPATIENT)
Dept: INTERNAL MEDICINE CLINIC | Facility: CLINIC | Age: 63
End: 2023-06-16
Payer: MEDICAID

## 2023-06-16 ENCOUNTER — TELEPHONE (OUTPATIENT)
Dept: INTERNAL MEDICINE CLINIC | Facility: CLINIC | Age: 63
End: 2023-06-16

## 2023-06-16 DIAGNOSIS — U07.1 COVID-19: Primary | ICD-10-CM

## 2023-06-16 NOTE — TELEPHONE ENCOUNTER
[facility] contacted, spoke to pharmacist.       Pharmacist advised patient's symptoms started on Wednesday 6/14/23. Last CMP 9/28/22 BUN 16,Creatinine 0.99, BUN/CREA ratio 16.2,, eGFR-Cr =60    Pharmacist will get patient's medication ready.

## 2023-06-16 NOTE — TELEPHONE ENCOUNTER
Infection banner updated. [facility] message sent with instruction per protocol with  CDC isolation guidelines informations. Jamey Messina, RN 6/15/2023  5:52 PM CDT        ----- Message -----  From: Dieter Lerma RN  Sent: 6/15/2023   3:37 PM CDT  To: Em Rn Triage  Subject: FW: Positive covid test                            ----- Message -----  From: Peter Ayala  Sent: 6/15/2023   2:38 PM CDT  To: Em Triage Support  Subject: Positive covid test                              Good afternoon Dr. Yamel Orellana,    We recently returned from a trip to [place] on [place]. My  felt ill, Sunday night he took covid test and it is positive. Today I woke up feeling terrible, took a covid test and now I am positive for Covid. Is there any way you could call in paxlovid for me please?     Bridger Owens  [phone]
Patient returned call. Symptoms of cough, head and chest congestion, sore throat and aches. Requesting rx for paxlovid. History of cardiac involvement from previous covid infection. Denies chest pain or shortness of breath currently. Acute virtual visit booked today.
[facility]

## 2023-06-16 NOTE — TELEPHONE ENCOUNTER
[address]  [phone]  To fill Paxlovid:    Call with Date symptoms of covid began and secondly with any renal impairment patient has

## 2023-06-29 ENCOUNTER — LAB ENCOUNTER (OUTPATIENT)
Dept: LAB | Age: 63
End: 2023-06-29
Attending: INTERNAL MEDICINE
Payer: MEDICAID

## 2023-06-29 DIAGNOSIS — I42.8 OBSCURE CARDIOMYOPATHY OF AFRICA (HCC): ICD-10-CM

## 2023-06-29 DIAGNOSIS — I51.9 HEART DISEASE, UNSPECIFIED: ICD-10-CM

## 2023-06-29 DIAGNOSIS — I10 HTN (HYPERTENSION): Primary | ICD-10-CM

## 2023-06-29 LAB
ALBUMIN SERPL-MCNC: 4 G/DL (ref 3.4–5)
ALBUMIN/GLOB SERPL: 1.2 {RATIO} (ref 1–2)
ALP LIVER SERPL-CCNC: 82 U/L
ALT SERPL-CCNC: 26 U/L
ANION GAP SERPL CALC-SCNC: 0 MMOL/L (ref 0–18)
AST SERPL-CCNC: 18 U/L (ref 15–37)
BILIRUB SERPL-MCNC: 0.6 MG/DL (ref 0.1–2)
BUN BLD-MCNC: 12 MG/DL (ref 7–18)
CALCIUM BLD-MCNC: 9.2 MG/DL (ref 8.5–10.1)
CHLORIDE SERPL-SCNC: 109 MMOL/L (ref 98–112)
CHOLEST SERPL-MCNC: 227 MG/DL (ref ?–200)
CO2 SERPL-SCNC: 29 MMOL/L (ref 21–32)
CREAT BLD-MCNC: 0.94 MG/DL
FASTING PATIENT LIPID ANSWER: YES
FASTING STATUS PATIENT QL REPORTED: YES
GFR SERPLBLD BASED ON 1.73 SQ M-ARVRAT: 68 ML/MIN/1.73M2 (ref 60–?)
GLOBULIN PLAS-MCNC: 3.4 G/DL (ref 2.8–4.4)
GLUCOSE BLD-MCNC: 84 MG/DL (ref 70–99)
HDLC SERPL-MCNC: 62 MG/DL (ref 40–59)
LDLC SERPL CALC-MCNC: 147 MG/DL (ref ?–100)
NONHDLC SERPL-MCNC: 165 MG/DL (ref ?–130)
OSMOLALITY SERPL CALC.SUM OF ELEC: 285 MOSM/KG (ref 275–295)
POTASSIUM SERPL-SCNC: 4.5 MMOL/L (ref 3.5–5.1)
PROT SERPL-MCNC: 7.4 G/DL (ref 6.4–8.2)
SODIUM SERPL-SCNC: 138 MMOL/L (ref 136–145)
TRIGL SERPL-MCNC: 100 MG/DL (ref 30–149)
VLDLC SERPL CALC-MCNC: 19 MG/DL (ref 0–30)

## 2023-06-29 PROCEDURE — 36415 COLL VENOUS BLD VENIPUNCTURE: CPT

## 2023-06-29 PROCEDURE — 80053 COMPREHEN METABOLIC PANEL: CPT

## 2023-06-29 PROCEDURE — 80061 LIPID PANEL: CPT

## 2023-07-07 ENCOUNTER — PATIENT MESSAGE (OUTPATIENT)
Dept: DERMATOLOGY CLINIC | Facility: CLINIC | Age: 63
End: 2023-07-07

## 2023-07-14 RX ORDER — ALPRAZOLAM 0.5 MG/1
0.5 TABLET ORAL 2 TIMES DAILY PRN
Qty: 60 TABLET | Refills: 0 | Status: SHIPPED | OUTPATIENT
Start: 2023-07-14

## 2023-07-14 NOTE — TELEPHONE ENCOUNTER
Future Appointments   Date Time Provider Laina Oglesby   7/19/2023 10:00 AM Avis English MD [facility]   9/29/2023  9:15 AM Jacqueline Garcia MD [facility]         Please review; protocol failed/no protocol. Requested Prescriptions   Pending Prescriptions Disp Refills    ALPRAZOLAM 0.5 MG Oral Tab [Pharmacy Med Name: Alprazolam 0.5 Mg Tab Acta] 60 tablet 0     Sig: Take 1 tablet (0.5 mg total) by mouth 2 (two) times daily as needed for Anxiety.        There is no refill protocol information for this order           Recent Outpatient Visits              4 weeks ago COVID-19    [address], [address] [facility], [facility]    Telemedicine    2 months ago Primary osteoarthritis of knees, bilateral    [address], [address] [place], [place]    Office Visit    3 months ago Multiple nevi    [address] [place] [address] Heydi Srinivasan MD    Office Visit    3 months ago Primary osteoarthritis of knees, bilateral    [address], [address], [place], [place]    Office Visit    3 months ago HTN (hypertension), benign    Delores Milligan MD    Office Visit             Future Appointments         Provider Department Appt Notes    In 5 days Avis English MD [address], [address], [facility]     In 2 months Jacqueline Garcia MD [address] [facility] annual physical

## 2023-07-19 ENCOUNTER — OFFICE VISIT (OUTPATIENT)
Dept: SURGERY | Facility: CLINIC | Age: 63
End: 2023-07-19
Payer: MEDICAID

## 2023-07-19 VITALS
SYSTOLIC BLOOD PRESSURE: 108 MMHG | HEART RATE: 72 BPM | HEIGHT: 68 IN | DIASTOLIC BLOOD PRESSURE: 70 MMHG | BODY MASS INDEX: 28.04 KG/M2 | WEIGHT: 185 LBS | OXYGEN SATURATION: 98 %

## 2023-07-19 DIAGNOSIS — E66.3 OVERWEIGHT (BMI 25.0-29.9): ICD-10-CM

## 2023-07-19 DIAGNOSIS — G47.33 OSA ON CPAP: ICD-10-CM

## 2023-07-19 DIAGNOSIS — Z51.81 ENCOUNTER FOR THERAPEUTIC DRUG MONITORING: ICD-10-CM

## 2023-07-19 DIAGNOSIS — E78.5 DYSLIPIDEMIA: ICD-10-CM

## 2023-07-19 DIAGNOSIS — I10 HTN (HYPERTENSION), BENIGN: Primary | ICD-10-CM

## 2023-07-19 PROCEDURE — 3008F BODY MASS INDEX DOCD: CPT | Performed by: INTERNAL MEDICINE

## 2023-07-19 PROCEDURE — 99214 OFFICE O/P EST MOD 30 MIN: CPT | Performed by: INTERNAL MEDICINE

## 2023-07-19 PROCEDURE — 3074F SYST BP LT 130 MM HG: CPT | Performed by: INTERNAL MEDICINE

## 2023-07-19 PROCEDURE — 3078F DIAST BP <80 MM HG: CPT | Performed by: INTERNAL MEDICINE

## 2023-08-15 ENCOUNTER — OFFICE VISIT (OUTPATIENT)
Dept: PHYSICAL MEDICINE AND REHAB | Facility: CLINIC | Age: 63
End: 2023-08-15
Payer: MEDICAID

## 2023-08-15 VITALS — WEIGHT: 185 LBS | OXYGEN SATURATION: 98 % | HEART RATE: 84 BPM | HEIGHT: 68 IN | BODY MASS INDEX: 28.04 KG/M2

## 2023-08-15 DIAGNOSIS — M25.561 ACUTE PAIN OF RIGHT KNEE: Primary | ICD-10-CM

## 2023-08-15 DIAGNOSIS — M50.20 HNP (HERNIATED NUCLEUS PULPOSUS), CERVICAL: ICD-10-CM

## 2023-08-15 DIAGNOSIS — Z17.0 MALIGNANT NEOPLASM OF UPPER-INNER QUADRANT OF LEFT BREAST IN FEMALE, ESTROGEN RECEPTOR POSITIVE: ICD-10-CM

## 2023-08-15 DIAGNOSIS — M18.0 ARTHRITIS OF CARPOMETACARPAL (CMC) JOINT OF BOTH THUMBS: ICD-10-CM

## 2023-08-15 DIAGNOSIS — M17.0 PRIMARY OSTEOARTHRITIS OF KNEES, BILATERAL: ICD-10-CM

## 2023-08-15 DIAGNOSIS — C50.212 MALIGNANT NEOPLASM OF UPPER-INNER QUADRANT OF LEFT BREAST IN FEMALE, ESTROGEN RECEPTOR POSITIVE: ICD-10-CM

## 2023-08-15 DIAGNOSIS — F41.1 GAD (GENERALIZED ANXIETY DISORDER): ICD-10-CM

## 2023-08-15 PROCEDURE — 3008F BODY MASS INDEX DOCD: CPT | Performed by: PHYSICAL MEDICINE & REHABILITATION

## 2023-08-15 PROCEDURE — 99214 OFFICE O/P EST MOD 30 MIN: CPT | Performed by: PHYSICAL MEDICINE & REHABILITATION

## 2023-08-18 ENCOUNTER — OFFICE VISIT (OUTPATIENT)
Dept: DERMATOLOGY CLINIC | Facility: CLINIC | Age: 63
End: 2023-08-18

## 2023-08-18 DIAGNOSIS — D03.59 MALIGNANT MELANOMA IN SITU OF SKIN OF ANTERIOR CHEST (HCC): ICD-10-CM

## 2023-08-18 DIAGNOSIS — D23.70 BENIGN NEOPLASM OF SKIN OF LOWER LIMB, INCLUDING HIP, UNSPECIFIED LATERALITY: ICD-10-CM

## 2023-08-18 DIAGNOSIS — L81.4 LENTIGO: ICD-10-CM

## 2023-08-18 DIAGNOSIS — D23.4 BENIGN NEOPLASM OF SCALP AND SKIN OF NECK: ICD-10-CM

## 2023-08-18 DIAGNOSIS — L82.1 SEBORRHEIC KERATOSES: ICD-10-CM

## 2023-08-18 DIAGNOSIS — D22.9 MULTIPLE NEVI: Primary | ICD-10-CM

## 2023-08-18 DIAGNOSIS — D23.60 BENIGN NEOPLASM OF SKIN OF UPPER LIMB, INCLUDING SHOULDER, UNSPECIFIED LATERALITY: ICD-10-CM

## 2023-08-18 DIAGNOSIS — D23.30 BENIGN NEOPLASM OF SKIN OF FACE: ICD-10-CM

## 2023-08-18 DIAGNOSIS — D23.5 BENIGN NEOPLASM OF SKIN OF TRUNK: ICD-10-CM

## 2023-08-18 PROCEDURE — 99214 OFFICE O/P EST MOD 30 MIN: CPT | Performed by: DERMATOLOGY

## 2023-08-18 RX ORDER — PANTOPRAZOLE SODIUM 20 MG/1
20 TABLET, DELAYED RELEASE ORAL DAILY
COMMUNITY
Start: 2023-07-13

## 2023-08-21 ENCOUNTER — TELEPHONE (OUTPATIENT)
Dept: PHYSICAL MEDICINE AND REHAB | Facility: CLINIC | Age: 63
End: 2023-08-21

## 2023-08-25 ENCOUNTER — HOSPITAL ENCOUNTER (OUTPATIENT)
Dept: MRI IMAGING | Facility: HOSPITAL | Age: 63
Discharge: HOME | End: 2023-08-25
Attending: PHYSICAL MEDICINE & REHABILITATION
Payer: MEDICAID

## 2023-08-25 DIAGNOSIS — M25.561 ACUTE PAIN OF RIGHT KNEE: ICD-10-CM

## 2023-08-25 PROCEDURE — 73721 MRI JNT OF LWR EXTRE W/O DYE: CPT | Performed by: PHYSICAL MEDICINE & REHABILITATION

## 2023-08-27 NOTE — PROGRESS NOTES
Shellie He is a 58year old female. HPI:     CC:  Patient presents with:  Full Skin Exam: Hx of melanoma. LOV 2023. Pt presents for full body exam r/t hx of skin ca. Lesion of concern to the left ear and left shoulder, denies bleeding or pain. Allergies:  Banana, Cantaloupe, Cephalosporins, Sulfamethoxazole, Trimethoprim, Milk-Related Compounds, Dust Mite Extract, and Penicillins    HISTORY:    Past Medical History:   Diagnosis Date    Allergic rhinitis     Anesthesia complication     blood pressure bottomed out - 6-8 hrs after surgery. Revived on own when staff moved pt.     Anxiety     Anxiety state     Cancer ([facility] [address]     Breast Cancer DX     Chronic rhinitis     Depression     History of neuroma 2011    \"Neuroma 3 rt\"    [facility] 20[facility]    Left chest    Metatarsalgia 2011    \"modified insole / rx naprosyn\"    LAURY on CPAP     PONV (postoperative nausea and vomiting)     Right knee injury     Arthroscopy    Visual impairment     glasses/contacts,dry eye      Past Surgical History:   Procedure Laterality Date          x 4    COLONOSCOPY  2013    per NG    COLONOSCOPY      2013    HYSTEROSCOPY  2017    myosure polypectomy    KNEE ARTHROSCOPY Right     x3 last     [facility] LOCALIZATION WIRE 1 SITE LEFT (CPT=19281)      ROSI LOCALIZATION WIRE 1 SITE RIGHT (CPT=19281)      OTHER SURGICAL HISTORY       arthroscopic surgery 3 times right knee meniscal tear    RADIATION Bilateral 04/15/2019    recent radiation tx for breast cancer    REDUCTION LEFT  2014    REDUCTION OF LARGE BREAST Bilateral 2014    REDUCTION RIGHT  2014      Family History   Adopted: Yes   Family history unknown: Yes      Social History     Socioeconomic History    Marital status:    Tobacco Use    Smoking status: Former     Packs/day: 0.00     Years: 18.00     Pack years: 0.00     Types: Cigarettes     Quit date: 1986     Years since quittin.1    Smokeless tobacco: Never    Tobacco comments:     I have not smoked in over 35 years   Vaping Use    Vaping Use: Never used   Substance and Sexual Activity    Alcohol use: Yes     Alcohol/week: 2.0 standard drinks of alcohol     Types: 2 Cans of beer per week     Comment: minimal consumption    Drug use: No   Other Topics Concern    Caffeine Concern Yes     Comment: Coffee, 1 cups daily; Tea;     Exercise Yes     Comment: walking, yoga    Grew up on a farm No    History of tanning Yes    Outdoor occupation No    Pt has a pacemaker No    Pt has a defibrillator No    Breast feeding No    Reaction to local anesthetic No    Left Handed No    Right Handed Yes    Currently spends a great deal of time in the sun No    Hx of Spending [place] of Time in [place] No    Bad sunburns in the past No    Tanning Salons in the Past No    Hx of Radiation Treatments Yes   Social History Narrative    The patient does not use an assistive device. .      The patient does live in a home with stairs. Current Outpatient Medications   Medication Sig Dispense Refill    pantoprazole 20 MG Oral Tab EC Take 1 tablet (20 mg total) by mouth daily. ALPRAZolam 0.5 MG Oral Tab Take 1 tablet (0.5 mg total) by mouth 2 (two) times daily as needed for Anxiety. 60 tablet 0    Cetirizine HCl (ZYRTEC ALLERGY) 10 MG Oral Cap       pantoprazole 40 MG Oral Tab EC Take 1 tablet (40 mg total) by mouth daily. metoprolol succinate  MG Oral Tablet 24 Hr Take 1 tablet (100 mg total) by mouth daily. lisinopril 10 MG Oral Tab Take 1 tablet (10 mg total) by mouth daily. Efinaconazole (JUBLIA) 10 % External Solution Apply 1 Application. topically 3 (three) times a week. 8 mL 3    Azelastine HCl 137 MCG/SPRAY Nasal Solution 2 sprays by Nasal route 2 (two) times daily.  30 mL 5    PATADAY 0.7 % Ophthalmic Solution INSTILL 1 DROP INTO BOTH EYES ONCE A DAY AS NEEDED FOR ALLERGY RELIEF      anastrozole 1 MG Oral Tab tab Take 1 tablet (1 mg total) by mouth daily.      Probiotic Product (ALIGN OR) Take by mouth. Allergies:     Banana                  HIVES  Cantaloupe              HIVES  Cephalosporins          HIVES  Sulfamethoxazole        HIVES  Trimethoprim            HIVES  Milk-Related Compou*    PAIN    Comment:Stomach pain, diarrhea  Dust Mite Extract       ITCHING  Penicillins             HIVES, RASH    Past Medical History:   Diagnosis Date    Allergic rhinitis     Anesthesia complication     blood pressure bottomed out - 6-8 hrs after surgery. Revived on own when staff moved pt.     Anxiety     Anxiety state     [facility]     Breast Cancer DX     Chronic rhinitis     Depression     History of neuroma 2011    \"Neuroma 3 rt\"    Melanoma in situ ([facility] [address] 2022    Left chest    Metatarsalgia 2011    \"modified insole / rx naprosyn\"    LAURY on CPAP     PONV (postoperative nausea and vomiting)     Right knee injury     Arthroscopy    Visual impairment     glasses/contacts,dry eye     Past Surgical History:   Procedure Laterality Date          x 4    COLONOSCOPY  2013    per NG    COLONOSCOPY      2013    HYSTEROSCOPY  2017    myosure polypectomy    KNEE ARTHROSCOPY Right     x3 last     [facility] LOCALIZATION WIRE 1 SITE LEFT (CPT=19281)      ROSI LOCALIZATION WIRE 1 SITE RIGHT (CPT=19281)      OTHER SURGICAL HISTORY       arthroscopic surgery 3 times right knee meniscal tear    RADIATION Bilateral 04/15/2019    recent radiation tx for breast cancer    REDUCTION LEFT  2014    REDUCTION OF LARGE BREAST Bilateral 2014    REDUCTION RIGHT  2014     Social History    Socioeconomic History      Marital status:       Spouse name: Not on file      Number of children: Not on file      Years of education: Not on file      Highest education level: Not on file    Occupational History      Not on file    Tobacco Use      Smoking status: Former        Packs/day: 0.00        Years: 18.00        Pack years: 0        Types: Cigarettes        Quit date: 1986        Years since quittin.1      Smokeless tobacco: Never      Tobacco comments: I have not smoked in over 35 years    Vaping Use      Vaping Use: Never used    Substance and Sexual Activity      Alcohol use: Yes        Alcohol/week: 2.0 standard drinks of alcohol        Types: 2 Cans of beer per week        Comment: minimal consumption      Drug use: No      Sexual activity: Not on file    Other Topics      Concerns:         Service: Not Asked        Blood Transfusions: Not Asked        Caffeine Concern: Yes          Coffee, 1 cups daily; Tea;         Occupational Exposure: Not Asked        Hobby Hazards: Not Asked        Sleep Concern: Not Asked        Stress Concern: Not Asked        Weight Concern: Not Asked        Special Diet: Not Asked        Back Care: Not Asked        Exercise: Yes          walking, yoga        Bike Helmet: Not Asked        Seat Belt: Not Asked        Self-Exams: Not Asked        Grew up on a farm: No        History of tanning: Yes        Outdoor occupation: No        Pt has a pacemaker: No        Pt has a defibrillator: No        Breast feeding: No        Reaction to local anesthetic: No        Left Handed: No        Right Handed: Yes        Currently spends a great deal of time in the sun: No        Past Sunlamp Treatments for Acne: Not Asked        Hx of Spending [place] of Time in Sun: No        Bad sunburns in the past: No        Tanning Salons in the Past: No        Hx of Radiation Treatments: Yes        Regular use of sun block: Not Asked    Social History Narrative      The patient does not use an assistive device. .        The patient does live in a home with stairs.     Social Determinants of Health  Financial Resource Strain: Not on file  Food Insecurity: Not on file  Transportation Needs: Not on file  Physical Activity: Not on file  Stress: Not on file  Social Connections: Not on file  Housing Stability: Not on file  Family History   Adopted: Yes   Family history unknown: Yes       There were no vitals filed for this visit. HPI:    Patient presents with:  Full Skin Exam: Hx of melanoma. LOV 4/2023. Pt presents for full body exam r/t hx of skin ca. Lesion of concern to the left ear and left shoulder, denies bleeding or pain. Patient adopted unknown family history  Patient here for follow-up history melanoma in situ at left chest post wide excision  Concern with lesion at lateral orbit  No prior Personal history of skin cancer or atypical lesions removed. Lots of sun exposure in the past.  Using VSee product helping. Overall doing well careful sun protection. Notes lesion left ear left shoulder    Concerns as above    Patient presents with concerns above. Patient has been in their usual state of health. History, medications, allergies reviewed as noted. ROS:  Denies any other systemic complaints. No new or changeing lesions other than noted above. No fevers, chills, night sweats, unusual sun sensitivity. No other skin complaints. History, medications, allergies reviewed as noted. Physical Examination:     Well-developed well-nourished patient alert oriented in no acute distress. Exam total-body performed, including scalp, head, neck, face,nails, hair, external eyes, including conjunctival mucosa, eyelids, lips external ears, back, chest,/ breasts, axillae,  abdomen, arms, legs, palms. Multiple light to medium brown, well marginated, uniformly pigmented, macules and papules 6 mm and less scattered on exam. pigmented lesions examined with dermoscopy benign-appearing patterns. Waxy tannish keratotic papules scattered, cherry-red vascular papules scattered. See map today's date for lesions noted . Otherwise remarkable for lesions as noted on map.   See details of examination  See Assessment /Plan for additional history and physical exam also:    Assessment / plan:    No orders of the defined types were placed in this encounter. Meds & Refills for this Visit:  Requested Prescriptions      No prescriptions requested or ordered in this encounter         Multiple nevi  (primary encounter diagnosis)  Lentigo  Seborrheic keratoses  Malignant melanoma in situ of skin of anterior chest (hcc)  Benign neoplasm of scalp and skin of neck  Benign neoplasm of skin of face  Benign neoplasm of skin of upper limb, including shoulder, unspecified laterality  Benign neoplasm of skin of trunk  Benign neoplasm of skin of lower limb, including hip, unspecified laterality    See details on map. Remarkable for:    Lesion left ear, shoulder benign keratoses reassurance. No atypical features no active AK's,  Exam otherwise unchanged  right mid back, shave biopsy:  -Compound lentiginous nevus10/22 no recurrence     left chest melanoma in situ6/22  Patient post wide excision melanoma in situ healed well. Continue careful sun protection regular follow-up    Lesion of concern right lateral orbit, temple benign lentigo continue monitoring appears unchanged  Lesion at right lateral orbit tan patch consistent with lentigo recommend observation. Lumbar back lesions benign keratoses more lesions however noted previously patient unchanged. Continue monitoring    Remaining pigmented lesions without evidence of atypical changes no significant changes in exam.         plan follow-up for full skin exam in 3 to 4 months    Waxy tan keratotic papules lesions in areas of concern as noted reassurance given. Benign nature discussed. Possibility of cryo, alphahydroxy acids over-the-counter retinol's discussed. Lentigo left lateral thigh observe. Moderate sun damage with extensive lentigines keratoses. Given history of breast cancer, sun exposure at least annual follow-up in future.   Patient adopted family history unknown careful sun protection, further plans pending pathology  Patient at high risk for both melanoma and nonmelanoma skin cancers new lesions. Continue careful monitoring every 4 months. History of verrucoid keratosis right forearm no recurrence    Waxy tan keratotic papule left temple reassurance regarding benign seborrheic keratosis reassurance given    Scattered keratoses back chest neck shoulders no suspicious lesions multiple lentigines reassurance continue sun protection    Few milia. Most prominent tong-nasally, cheeks and forehead on examination multiple whitish dome-shaped smooth papules are noted. Milia diagnosis , pathophysiology discussed. Over-the-counter retinol products may cause peeling irritation. Differin gel over-the-counter consider prescription Retin-A if worsening. No new suspicious lesions    Multiple nevi no significant changes. Continue regular skin checks. Follow-up 1 year or as needed    Please refer to map for specific lesions. See additional diagnoses. Pros cons of various therapies, risks benefits discussed. Pathophysiology discussed with patient. Therapeutic options reviewed. See  Medications in grid. Instructions reviewed at length. Benign nevi, seborrheic  keratoses, cherry angiomas:  Reassurance regarding other benign skin lesions. Signs and symptoms of skin cancer, ABCDE's of melanoma discussed with patient. Sunscreen use, sun protection, self exams reviewed. Followup as noted RTC routine checkup 6 mos - one year or p.r.n. Encounter Times   Including precharting, reviewing chart, prior notes obtaining history: 10 minutes, medical exam :10 minutes, notes on body map, plan, counseling 10minutes My total time spent caring for the patient on the day of the encounter: 30 minutes     The patient indicates understanding of these issues and agrees to the plan. The patient is asked to return as noted in follow-up/ above. This note was generated using Dragon voice recognition software. Please contact me regarding any confusion resulting from errors in recognition.

## 2023-08-28 ENCOUNTER — TELEPHONE (OUTPATIENT)
Dept: PHYSICAL MEDICINE AND REHAB | Facility: CLINIC | Age: 63
End: 2023-08-28

## 2023-08-28 NOTE — TELEPHONE ENCOUNTER
Patient called to find out if she needs to make any other appts with another specialist after reading her MRI results, but also states that she doesn't know what it all means, and she wasn't sure if she had a fracture, so wanted to be sure she didn't need an orthopedic. This RN advised that it would be best to wait for Dr. Donavon Curran recommendations. This RN reviewed the report and advised there didn't seem to be any acute fracture listed, mostly degenerative issues, and Dr. Rai Moore would be able to explain everything and interpret those results for her and go over recommendations for treatment. Patient already scheduled for follow up on 8/30/2023 in -office appointment. Nothing further needed at this time.

## 2023-08-29 ENCOUNTER — TELEPHONE (OUTPATIENT)
Dept: PHYSICAL MEDICINE AND REHAB | Facility: CLINIC | Age: 63
End: 2023-08-29

## 2023-08-30 ENCOUNTER — OFFICE VISIT (OUTPATIENT)
Dept: PHYSICAL MEDICINE AND REHAB | Facility: CLINIC | Age: 63
End: 2023-08-30
Payer: MEDICAID

## 2023-08-30 ENCOUNTER — TELEPHONE (OUTPATIENT)
Dept: PHYSICAL MEDICINE AND REHAB | Facility: CLINIC | Age: 63
End: 2023-08-30

## 2023-08-30 VITALS
DIASTOLIC BLOOD PRESSURE: 85 MMHG | HEIGHT: 68 IN | SYSTOLIC BLOOD PRESSURE: 137 MMHG | HEART RATE: 84 BPM | OXYGEN SATURATION: 98 % | BODY MASS INDEX: 28.17 KG/M2 | WEIGHT: 185.88 LBS

## 2023-08-30 DIAGNOSIS — M17.11 PRIMARY OSTEOARTHRITIS OF RIGHT KNEE: Primary | ICD-10-CM

## 2023-08-30 RX ORDER — TRIAMCINOLONE ACETONIDE 40 MG/ML
40 INJECTION, SUSPENSION INTRA-ARTICULAR; INTRAMUSCULAR ONCE
Status: COMPLETED | OUTPATIENT
Start: 2023-08-30 | End: 2023-08-30

## 2023-08-30 RX ORDER — LIDOCAINE HYDROCHLORIDE 10 MG/ML
7 INJECTION, SOLUTION INFILTRATION; PERINEURAL ONCE
Status: COMPLETED | OUTPATIENT
Start: 2023-08-30 | End: 2023-08-30

## 2023-08-30 RX ADMIN — TRIAMCINOLONE ACETONIDE 40 MG: 40 INJECTION, SUSPENSION INTRA-ARTICULAR; INTRAMUSCULAR at 08:34:00

## 2023-08-30 RX ADMIN — LIDOCAINE HYDROCHLORIDE 7 ML: 10 INJECTION, SOLUTION INFILTRATION; PERINEURAL at 08:34:00

## 2023-09-05 NOTE — TELEPHONE ENCOUNTER
Please review refill protocol failed/ no protocol  Requested Prescriptions   Pending Prescriptions Disp Refills    ALPRAZOLAM 0.5 MG Oral Tab [Pharmacy Med Name: Alprazolam 0.5 Mg Tab Acta] 60 tablet 0     Sig: Take 1 tablet (0.5 mg total) by mouth 2 (two) times daily as needed for Anxiety.        There is no refill protocol information for this order

## 2023-09-06 RX ORDER — ALPRAZOLAM 0.5 MG/1
0.5 TABLET ORAL 2 TIMES DAILY PRN
Qty: 60 TABLET | Refills: 0 | Status: SHIPPED | OUTPATIENT
Start: 2023-09-06

## 2023-09-29 ENCOUNTER — OFFICE VISIT (OUTPATIENT)
Dept: INTERNAL MEDICINE CLINIC | Facility: CLINIC | Age: 63
End: 2023-09-29

## 2023-09-29 ENCOUNTER — TELEPHONE (OUTPATIENT)
Facility: CLINIC | Age: 63
End: 2023-09-29

## 2023-09-29 VITALS
HEART RATE: 61 BPM | HEIGHT: 68 IN | TEMPERATURE: 99 F | BODY MASS INDEX: 27.84 KG/M2 | SYSTOLIC BLOOD PRESSURE: 105 MMHG | DIASTOLIC BLOOD PRESSURE: 69 MMHG | OXYGEN SATURATION: 97 % | WEIGHT: 183.69 LBS

## 2023-09-29 DIAGNOSIS — C50.212 MALIGNANT NEOPLASM OF UPPER-INNER QUADRANT OF LEFT BREAST IN FEMALE, ESTROGEN RECEPTOR POSITIVE: ICD-10-CM

## 2023-09-29 DIAGNOSIS — Z00.00 ANNUAL PHYSICAL EXAM: Primary | ICD-10-CM

## 2023-09-29 DIAGNOSIS — M85.89 OSTEOPENIA OF MULTIPLE SITES: ICD-10-CM

## 2023-09-29 DIAGNOSIS — Z12.11 COLON CANCER SCREENING: ICD-10-CM

## 2023-09-29 DIAGNOSIS — F41.1 GAD (GENERALIZED ANXIETY DISORDER): ICD-10-CM

## 2023-09-29 DIAGNOSIS — G47.33 OSA ON CPAP: ICD-10-CM

## 2023-09-29 DIAGNOSIS — Z12.11 COLON CANCER SCREENING: Primary | ICD-10-CM

## 2023-09-29 DIAGNOSIS — Z17.0 MALIGNANT NEOPLASM OF UPPER-INNER QUADRANT OF LEFT BREAST IN FEMALE, ESTROGEN RECEPTOR POSITIVE: ICD-10-CM

## 2023-09-29 DIAGNOSIS — I51.9 LEFT VENTRICULAR SYSTOLIC DYSFUNCTION (LVSD): ICD-10-CM

## 2023-09-29 DIAGNOSIS — E78.2 MIXED HYPERLIPIDEMIA: ICD-10-CM

## 2023-09-29 PROCEDURE — 3074F SYST BP LT 130 MM HG: CPT | Performed by: INTERNAL MEDICINE

## 2023-09-29 PROCEDURE — 3008F BODY MASS INDEX DOCD: CPT | Performed by: INTERNAL MEDICINE

## 2023-09-29 PROCEDURE — 3078F DIAST BP <80 MM HG: CPT | Performed by: INTERNAL MEDICINE

## 2023-09-29 PROCEDURE — 99396 PREV VISIT EST AGE 40-64: CPT | Performed by: INTERNAL MEDICINE

## 2023-09-29 RX ORDER — DIETHYLPROPION HYDROCHLORIDE 25 MG/1
1 TABLET ORAL 2 TIMES DAILY
COMMUNITY
Start: 2023-09-04

## 2023-09-29 NOTE — TELEPHONE ENCOUNTER
Patient calling states due for 10yr recall advised to fu if consult is needed, please call and advise. See 8/25/2013 Declined to schedule request call from RN.

## 2023-09-29 NOTE — PROGRESS NOTES
Subjective:     Patient ID: Azalia Fuentes is a 58year old female. Patient presents today for her annual physical.         History/Other:   Review of Systems   Constitutional: Negative. HENT: Negative. Eyes: Negative. Respiratory: Negative. No hemoptysis   Cardiovascular: Negative. Gastrointestinal: Negative. Genitourinary: Negative. Allergic/Immunologic: Positive for environmental allergies and food allergies. Negative for immunocompromised state. Hematological: Negative. Current Outpatient Medications   Medication Sig Dispense Refill    Diethylpropion HCl 25 MG Oral Tab Take 1 tablet by mouth 2 (two) times daily. ALPRAZolam 0.5 MG Oral Tab Take 1 tablet (0.5 mg total) by mouth 2 (two) times daily as needed for Anxiety. 60 tablet 0    pantoprazole 40 MG Oral Tab EC Take 1 tablet (40 mg total) by mouth daily. metoprolol succinate  MG Oral Tablet 24 Hr Take 1 tablet (100 mg total) by mouth daily. lisinopril 10 MG Oral Tab Take 1 tablet (10 mg total) by mouth daily. Efinaconazole (JUBLIA) 10 % External Solution Apply 1 Application. topically 3 (three) times a week. 8 mL 3    Azelastine HCl 137 MCG/SPRAY Nasal Solution 2 sprays by Nasal route 2 (two) times daily. 30 mL 5    PATADAY 0.7 % Ophthalmic Solution INSTILL 1 DROP INTO BOTH EYES ONCE A DAY AS NEEDED FOR ALLERGY RELIEF      anastrozole 1 MG Oral Tab tab Take 1 tablet (1 mg total) by mouth daily. Probiotic Product (ALIGN OR) Take by mouth.      pantoprazole 20 MG Oral Tab EC Take 1 tablet (20 mg total) by mouth daily.       Cetirizine HCl (ZYRTEC ALLERGY) 10 MG Oral Cap        Allergies:  Banana                  HIVES  Cantaloupe              HIVES  Cephalosporins          HIVES  Sulfamethoxazole        HIVES  Trimethoprim            HIVES  Milk-Related Compou*    PAIN    Comment:Stomach pain, diarrhea  Dust Mite Extract       ITCHING  Penicillins             HIVES, RASH    Past Medical History:   Diagnosis Date    Allergic rhinitis     Anesthesia complication     blood pressure bottomed out - 6-8 hrs after surgery. Revived on own when staff moved pt. Anxiety     Anxiety state     Cancer ([facility] [address]     Breast Cancer DX     Chronic rhinitis     Depression     History of neuroma 2011    \"Neuroma 3 rt\"    Melanoma in situ ([facility] [address] 2022    Left chest    Metatarsalgia 2011    \"modified insole / rx naprosyn\"    LAURY on CPAP     PONV (postoperative nausea and vomiting)     Right knee injury     Arthroscopy    Visual impairment     glasses/contacts,dry eye      Past Surgical History:   Procedure Laterality Date          x 4    COLONOSCOPY  2013    per NG    COLONOSCOPY          HYSTEROSCOPY  2017    myosure polypectomy    KNEE ARTHROSCOPY Right     x3 last     ROSI LOCALIZATION WIRE 1 SITE LEFT (CPT=19281)      ROSI LOCALIZATION WIRE 1 SITE RIGHT (CPT=19281)      OTHER SURGICAL HISTORY       arthroscopic surgery 3 times right knee meniscal tear    RADIATION Bilateral 04/15/2019    recent radiation tx for breast cancer    REDUCTION LEFT  2014    REDUCTION OF LARGE BREAST Bilateral 2014    REDUCTION RIGHT  2014      Family History   Adopted: Yes   Family history unknown: Yes      Social History:   Social History     Socioeconomic History    Marital status:    Tobacco Use    Smoking status: Former     Packs/day: 0.00     Years: 18.00     Additional pack years: 0.00     Total pack years: 0.00     Types: Cigarettes     Quit date: 1986     Years since quittin.2     Passive exposure: Past    Smokeless tobacco: Never    Tobacco comments:     I have not smoked in over 35 years   Vaping Use    Vaping Use: Never used   Substance and Sexual Activity    Alcohol use:  Yes     Alcohol/week: 2.0 standard drinks of alcohol     Types: 2 Cans of beer per week     Comment: minimal consumption    Drug use: No   Other Topics Concern    Caffeine Concern Yes Comment: Coffee, 1 cups daily; Tea;     Exercise Yes     Comment: walking, yoga    Grew up on a farm No    History of tanning Yes    Outdoor occupation No    Pt has a pacemaker No    Pt has a defibrillator No    Breast feeding No    Reaction to local anesthetic No    Left Handed No    Right Handed Yes    Currently spends a great deal of time in the sun No    Hx of Spending [place] of Time in [place] No    Bad sunburns in the past No    Tanning Salons in the Past No    Hx of Radiation Treatments Yes   Social History Narrative    The patient does not use an assistive device. .      The patient does live in a home with stairs. Objective:   Physical Exam  Constitutional:       General: She is not in acute distress. Appearance: She is well-developed. She is not ill-appearing, toxic-appearing or diaphoretic. HENT:      Head: Normocephalic and atraumatic. Right Ear: External ear normal.      Left Ear: External ear normal.      Nose: Nose normal.      Mouth/Throat:      Pharynx: No oropharyngeal exudate. Eyes:      General:         Right eye: No discharge. Left eye: No discharge. Conjunctiva/sclera: Conjunctivae normal.      Pupils: Pupils are equal, round, and reactive to light. Neck:      Vascular: No carotid bruit or JVD. Cardiovascular:      Rate and Rhythm: Normal rate and regular rhythm. Heart sounds: Normal heart sounds. No murmur heard. Pulmonary:      Effort: Pulmonary effort is normal. No respiratory distress. Breath sounds: Normal breath sounds. No wheezing or rales. Abdominal:      General: Bowel sounds are normal. There is no distension. Palpations: Abdomen is soft. There is no mass. Tenderness: There is no abdominal tenderness. There is no guarding or rebound. Musculoskeletal:         General: No tenderness. Normal range of motion. Cervical back: Normal range of motion and neck supple. No rigidity or tenderness. Right lower leg: No edema. Left lower leg: No edema. Lymphadenopathy:      Cervical: No cervical adenopathy. Skin:     General: Skin is warm and dry. Coloration: Skin is not jaundiced or pale. Findings: No rash. Neurological:      Mental Status: She is alert and oriented to person, place, and time. Assessment & Plan:   (Z00.00) Annual physical exam  (primary encounter diagnosis)  Plan: Patient will be getting her flu shot COVID boosters for pharmacy.    (Z12.11) Colon cancer screening  Plan: Gastro Referral - In Network        For her colonoscopy and was given referral for heart make an appointment to get her colonoscopy. (F41.1) LYNETTE (generalized anxiety disorder)  Plan: Patient stable on current treatment. Continue as needed Xanax.    (C50.212,  Z17.0) Malignant neoplasm of upper-inner quadrant of left breast in female, estrogen receptor positive   Plan: She continues to be on adjuvant hormone therapy that has been managed by her oncologist.  She is current with her MRI of the breast done only few months ago. (I51.9) Left ventricular systolic dysfunction (LVSD)  Plan: She has been followed by a cardiologist and the question whether this was COVID-related cardiomyopathy or from radiation she got from her breast cancer before. She is on ACE inhibitor as well as beta-blocker and her systolic ejection fraction had actually normalized already.    (E78.2) Mixed hyperlipidemia  Plan: Recent lipid panel showed elevated cholesterol levels. She will discuss with her cardiologist if they would start her on statin therapy.    (M85.89) Osteopenia of multiple sites  Plan: She is on calcium vitamin D supplements. DEXA scan has been followed and ordered by her oncologist.    (G47.33) LAURY on CPAP  Plan: Continue use of CPAP machine. No orders of the defined types were placed in this encounter.       Meds This Visit:  Requested Prescriptions      No prescriptions requested or ordered in this encounter       Imaging & Referrals:  [facility] - INTERNAL

## 2023-09-29 NOTE — TELEPHONE ENCOUNTER
Latrell Shelton (office on call)    Patient called to schedule 10 year recall colonoscopy. Aware Dr. Yunior Gallegos retired so will be sent to on call. Please provide orders if ok to schedule directly. Thank you    Last Procedure, Date, MD:  8/24/2013  Last Diagnosis:  normal colonoscopy to terminal ileum, small internal hemorrhoids  Recalled (mth/yrs): 10 years  Sedation Used Previously:  IV  Last Prep Used (if known):  not indicated in charting available  Quality Of Prep (if known): very good with a large volume of water irrigation  Anticoagulants: no  Diuretics: no  Diabetic Med's (PO/Injectables): no  Weight loss Med's: no  Iron/Herbal/Multivitamin Supplements (RX/OTC): probiotic  Marijuana/Vaping/CBD: no  Height & Weight: 5'8\" 174 lbs  BMI: 26.5  Hx of Cardiac/CVA Issues/(MI/Stroke): no  Devices Pacemaker/Defibrillator/Stents: no  Respiratory Issues/Oxygen Use/LAURY/COPD: sleep apnea, uses cpap  Issues w/ Anesthesia: states no issue with colonoscopy sedation in the past.  Took a while to wake up after breast reduction surgery that was a lengthy procedure. Symptoms (Y/N): no  Symptoms Details: n/a    Special Comments/Notes: n/a    Please advise on orders and prep. Thank you!

## 2023-09-29 NOTE — TELEPHONE ENCOUNTER
Patient:   Amaya MADDOX #:   [id number]                    Room: [facility]  Flakito BLEDSOE #:  [id number]     ADMITTED:   08/24/2013        DATE OF PROCEDURE:  8/24/2013     PROCEDURE:  Colonoscopy. SURGEON:  Turner Horan M.D. ATTENDING PHYSICIAN:  M.A. Ruther Schilder, M.D. PREOPERATIVE DIAGNOSIS:  Colorectal screening. POSTOPERATIVE DIAGNOSES:  1. Normal colonoscopy to the terminal ileum. 2. Small internal hemorrhoids. BRIEF HISTORY:  This is a 58-year-old female who presented for   colorectal  screening. Informed consent was obtained after explanation of the   risks,  benefits, and alternatives of the procedure to the patient. PREOPERATIVE SEDATION:  Fentanyl 50 mcg IV push, Versed 2.0 mg IV   push. INTRAOPERATIVE SEDATION:  Fentanyl 50 mcg IV push in 25 mcg   divided  increments, Versed 6.0 mg IV push in 1 and 2 mg divided   increments. FINDINGS AND TECHNIQUE:  The patient was placed in the left   lateral  decubitus position. Preoperative sedation was administered. Then, a  rectal digital examination was performed revealing a normal   sphincter tone  and no masses. The colonoscope was then inserted and advanced   without  difficulty to the terminal ileum and then slowly withdrawn. The   entire  colon was carefully examined. Findings were as follows. FINDINGS:  The colonic prep was very good with a large volume of   water  irrigation. There were no polyps, mass lesions, AVMs,   diverticula, or  mucosal abnormalities identified. Retroflexion in the rectum   revealed  small internal hemorrhoidal tags. The patient tolerated the procedure well without immediate   complication. IMPRESSION:  1. Normal colonoscopy to the terminal ileum. 2. Small internal hemorrhoids. RECOMMENDATIONS:  Next screening colonoscopy in 10 years unless   other signs  or symptoms develop in the interim.            D:    08/24/2013 8:24 A  T:    08/25/2013  7:38 A  ATTENDING:  Silvino Joyner Estevan Mckay 5  DICTATING:    Sy Gomez MD [id number]  MD ID #:      [id number]  cc:   Sy Gomez MD [id number]        JOB NUMBER:         [id number]  [facility] #: [id number]. [id number]  MR #: [id number]                  Patient: Marie Harveyak

## 2023-10-09 NOTE — TELEPHONE ENCOUNTER
Called patient, name/ verified. Discussed with patient clinic appointment per Mona below. Offered available appointment. Patient confirmed and accepted appointment. Date, time, office location and contact number verified. Instructed patient to arrive 15 minutes before appt time and bring insurance card. Patient verbalized understanding. Your Appointments      2023  9:30 AM  [facility] [address]  [phone]       GI RN's,     Pt needs cardiac clearance prior to colonoscopy per Mona below. Thank you.

## 2023-10-09 NOTE — TELEPHONE ENCOUNTER
Pt previously had abnormal echo with incomplete stress test order, follows with Dr. Scottie Ro. Per ChartReview, Pt may be scheduled with [facility] cardiologist in 11/2023. Pt will need office visit and cardiac clearance prior to scheduling procedure.

## 2023-10-13 ENCOUNTER — OFFICE VISIT (OUTPATIENT)
Facility: CLINIC | Age: 63
End: 2023-10-13

## 2023-10-13 ENCOUNTER — TELEPHONE (OUTPATIENT)
Facility: CLINIC | Age: 63
End: 2023-10-13

## 2023-10-13 VITALS
WEIGHT: 182 LBS | DIASTOLIC BLOOD PRESSURE: 82 MMHG | SYSTOLIC BLOOD PRESSURE: 125 MMHG | HEART RATE: 76 BPM | HEIGHT: 68 IN | BODY MASS INDEX: 27.58 KG/M2

## 2023-10-13 DIAGNOSIS — Z12.11 SCREEN FOR COLON CANCER: Primary | ICD-10-CM

## 2023-10-13 DIAGNOSIS — Z12.11 COLON CANCER SCREENING: Primary | ICD-10-CM

## 2023-10-13 PROCEDURE — 3079F DIAST BP 80-89 MM HG: CPT | Performed by: REGISTERED NURSE

## 2023-10-13 PROCEDURE — S0285 CNSLT BEFORE SCREEN COLONOSC: HCPCS | Performed by: REGISTERED NURSE

## 2023-10-13 PROCEDURE — 3008F BODY MASS INDEX DOCD: CPT | Performed by: REGISTERED NURSE

## 2023-10-13 PROCEDURE — 3074F SYST BP LT 130 MM HG: CPT | Performed by: REGISTERED NURSE

## 2023-10-13 RX ORDER — POLYETHYLENE GLYCOL 3350, SODIUM CHLORIDE, SODIUM BICARBONATE, POTASSIUM CHLORIDE 420; 11.2; 5.72; 1.48 G/4L; G/4L; G/4L; G/4L
POWDER, FOR SOLUTION ORAL
Qty: 4000 ML | Refills: 0 | Status: SHIPPED | OUTPATIENT
Start: 2023-10-13

## 2023-10-13 NOTE — PATIENT INSTRUCTIONS
-Receive cardiac clearance from Dr. Raymond Patterson    1. Schedule colonoscopy with next available provider with [facility] [Diagnosis: CRC screening]    2.  bowel prep from pharmacy (split dose Trilyte)    3. Hold Diethylproprion 7 days prior to procedure. Continue all other medications as normal for your procedure. 4. Read all bowel prep instructions carefully. Bowel prep instructions can also be found online at:  www.health.org/giprep     5. AVOID seeds, nuts, popcorn, raw fruits and vegetables for 3 days before procedure    6. If you start any NEW medication after your visit today, please notify us. Certain medications (like iron or weight loss medications) will need to be held before the procedure, or the procedure cannot be performed safely.

## 2023-10-16 ENCOUNTER — TELEPHONE (OUTPATIENT)
Facility: CLINIC | Age: 63
End: 2023-10-16

## 2023-10-16 ENCOUNTER — PATIENT MESSAGE (OUTPATIENT)
Facility: CLINIC | Age: 63
End: 2023-10-16

## 2023-10-16 ENCOUNTER — TELEPHONE (OUTPATIENT)
Dept: PULMONOLOGY | Facility: CLINIC | Age: 63
End: 2023-10-16

## 2023-10-16 ENCOUNTER — TELEPHONE (OUTPATIENT)
Dept: INTERNAL MEDICINE CLINIC | Facility: CLINIC | Age: 63
End: 2023-10-16

## 2023-10-16 DIAGNOSIS — Z12.11 COLON CANCER SCREENING: Primary | ICD-10-CM

## 2023-10-16 NOTE — TELEPHONE ENCOUNTER
Physicians order request for cpap supplies from E placed on Dr. Santos Phshanika desk for signature and review.

## 2023-10-16 NOTE — TELEPHONE ENCOUNTER
Received fax from [address] [place] [address] with CPAP supply order.  Placed in Dr. Daja De La Garza folder for signature

## 2023-10-17 ENCOUNTER — MED REC SCAN ONLY (OUTPATIENT)
Dept: INTERNAL MEDICINE CLINIC | Facility: CLINIC | Age: 63
End: 2023-10-17

## 2023-10-17 NOTE — TELEPHONE ENCOUNTER
From: Jayne Fagan  To: Anthony Falk  Sent: 10/16/2023 4:27 PM CDT  Subject: Colonoscopy     I have decided to do Colorgaurd instead of regular colonoscopy. Thanks.   Neymar Horan  [phone]

## 2023-10-18 NOTE — TELEPHONE ENCOUNTER
I ordered cologuard for the patient. That is fine with me to proceed with cologuard testing. Can we ensure it is sent to her. Thanks.

## 2023-10-18 NOTE — TELEPHONE ENCOUNTER
Cologuard is fine. No polyps noted on last colonoscopy. No black/bloody stools or BRBPR noted. Discussed in office visit and Pt understands that if positive she will need to continue with colonoscopy. GI Rns,    Could you please coordinate cologuard for Pt.       Thanks,  Nehemiah Burris

## 2023-10-19 NOTE — TELEPHONE ENCOUNTER
Called patient, name/ verified. Message from Mona below relayed to pt. Pt verbalized understanding. Alfie Garcia,     I filled out Cologuard order form. I left it in your desk for you to sign. Thank you.

## 2023-10-23 NOTE — TELEPHONE ENCOUNTER
Please review. Protocol failed / Has no protocol. Recent fills each qty 60: 5/23, 7/14, 9/6     Requested Prescriptions   Pending Prescriptions Disp Refills    ALPRAZOLAM 0.5 MG Oral Tab [Pharmacy Med Name: Alprazolam 0.5 Mg Tab Acta] 60 tablet 0     Sig: Take 1 tablet (0.5 mg total) by mouth 2 (two) times daily as needed for Anxiety.        There is no refill protocol information for this order        Future Appointments         Provider Department Appt Notes    In 3 weeks DO Marsha Pressley, [address], [place] knee pain    In 4 weeks Markusrosita Moreno Fernandane Lulas [address] MRI results    In 1 month MD Marsha Virgen, [address], [place] MEDICAID  NON [id number] F/U    In 3 months [employer], [address], [address], [place] Colon @ [facility]           Recent Outpatient Visits              1 week ago Colon cancer screening    Marsha Chilel, [address], Mona Valles APRN    Office Visit    3 weeks ago Annual physical exam    Marsha Chiell, [address], Julianna Perdomo MD    Office Visit    1 month ago Primary osteoarthritis of right knee    [facility], Letty Villalobos DO    Office Visit    2 months ago Multiple nevi    Yue Lindsay, Jose A Mayo MD    Office Visit    2 months ago Acute pain of right knee    Ely Philip HoffmeisterApryl Cord, [place]    Office Visit

## 2023-10-24 RX ORDER — ALPRAZOLAM 0.5 MG/1
0.5 TABLET ORAL 2 TIMES DAILY PRN
Qty: 60 TABLET | Refills: 0 | Status: SHIPPED | OUTPATIENT
Start: 2023-10-24

## 2023-10-25 ENCOUNTER — PATIENT MESSAGE (OUTPATIENT)
Dept: DERMATOLOGY CLINIC | Facility: CLINIC | Age: 63
End: 2023-10-25

## 2023-10-25 NOTE — TELEPHONE ENCOUNTER
From: Corey Lamb  To: Amanda Banner MD [facility]  Sent: 10/25/2023 8:37 AM CDT  Subject: Cold sore on lip    Good morning Dr. Pham Eli,  I have had a cold sore on my lip since end of September, looks like a sore on my lip. Do i need to be seen or do they sometimes take longer to heal?     Thank you.    Vern Snellen  [phone]

## 2023-10-26 NOTE — TELEPHONE ENCOUNTER
Signed order faxed with confirmation. Copy placed in [facility] folder for pickup. Sent signed document for scanning.

## 2023-10-27 ENCOUNTER — OFFICE VISIT (OUTPATIENT)
Dept: DERMATOLOGY CLINIC | Facility: CLINIC | Age: 63
End: 2023-10-27
Payer: MEDICAID

## 2023-10-27 DIAGNOSIS — L57.0 AK (ACTINIC KERATOSIS): Primary | ICD-10-CM

## 2023-10-27 DIAGNOSIS — D23.4 BENIGN NEOPLASM OF SCALP AND SKIN OF NECK: ICD-10-CM

## 2023-10-27 DIAGNOSIS — D23.5 BENIGN NEOPLASM OF SKIN OF TRUNK: ICD-10-CM

## 2023-10-27 DIAGNOSIS — D23.70 BENIGN NEOPLASM OF SKIN OF LOWER LIMB, INCLUDING HIP, UNSPECIFIED LATERALITY: ICD-10-CM

## 2023-10-27 DIAGNOSIS — D03.59 MALIGNANT MELANOMA IN SITU OF SKIN OF ANTERIOR CHEST (HCC): ICD-10-CM

## 2023-10-27 DIAGNOSIS — L82.1 SEBORRHEIC KERATOSES: ICD-10-CM

## 2023-10-27 DIAGNOSIS — D23.60 BENIGN NEOPLASM OF SKIN OF UPPER LIMB, INCLUDING SHOULDER, UNSPECIFIED LATERALITY: ICD-10-CM

## 2023-10-27 DIAGNOSIS — C50.212 MALIGNANT NEOPLASM OF UPPER-INNER QUADRANT OF LEFT BREAST IN FEMALE, ESTROGEN RECEPTOR POSITIVE: ICD-10-CM

## 2023-10-27 DIAGNOSIS — D23.30 BENIGN NEOPLASM OF SKIN OF FACE: ICD-10-CM

## 2023-10-27 DIAGNOSIS — Z17.0 MALIGNANT NEOPLASM OF UPPER-INNER QUADRANT OF LEFT BREAST IN FEMALE, ESTROGEN RECEPTOR POSITIVE: ICD-10-CM

## 2023-10-27 DIAGNOSIS — L81.4 LENTIGO: ICD-10-CM

## 2023-10-27 DIAGNOSIS — D22.9 MULTIPLE NEVI: ICD-10-CM

## 2023-10-27 PROCEDURE — 17003 DESTRUCT PREMALG LES 2-14: CPT | Performed by: DERMATOLOGY

## 2023-10-27 PROCEDURE — 17000 DESTRUCT PREMALG LESION: CPT | Performed by: DERMATOLOGY

## 2023-10-27 PROCEDURE — 99213 OFFICE O/P EST LOW 20 MIN: CPT | Performed by: DERMATOLOGY

## 2023-10-27 RX ORDER — FLUOROURACIL 5 MG/G
CREAM TOPICAL
Qty: 30 G | Refills: 2 | Status: SHIPPED | OUTPATIENT
Start: 2023-10-27 | End: 2023-11-02

## 2023-10-30 NOTE — TELEPHONE ENCOUNTER
I asked FRED Toro to sign the Coloquard order form as Blas Quan is on maternity leave. I faxed the signed Cologuard order form, face sheet and insurance info to [facility], fax # [phone]. Phone # [phone]. Successful confirmation received. Awaiting response.

## 2023-10-31 ENCOUNTER — TELEPHONE (OUTPATIENT)
Dept: DERMATOLOGY CLINIC | Facility: CLINIC | Age: 63
End: 2023-10-31

## 2023-10-31 NOTE — PROGRESS NOTES
Tanika Edmond is a 58year old female. HPI:     CC:  Patient presents with:  Full Skin Exam: LOV 23; Hx of melanoma; she notes a cold sore-like spot on right to mid upper lip, present for approx 2 months; denies bleeding, notes mild tenderness. Also notes a spot on right medial pinky finger. She is using Jublia for the fungus on the toenail, notes improvement. She is ok to do FSE today         Allergies:  Banana, Cantaloupe, Cephalosporins, Sulfamethoxazole, Trimethoprim, Milk-Related Compounds, Dust Mite Extract, and Penicillins    HISTORY:    Past Medical History:   Diagnosis Date    Allergic rhinitis     Anesthesia complication     blood pressure bottomed out - 6-8 hrs after surgery. Revived on own when staff moved pt.     Anxiety     Anxiety state     Cancer ([facility] [address]     Breast Cancer DX     Chronic rhinitis     Depression     History of neuroma 2011    \"Neuroma 3 rt\"    Melanoma in situ ([facility] [address] 2022    Left chest    Metatarsalgia 2011    \"modified insole / rx naprosyn\"    LAURY on CPAP     PONV (postoperative nausea and vomiting)     Right knee injury     Arthroscopy    Visual impairment     glasses/contacts,dry eye      Past Surgical History:   Procedure Laterality Date          x 4    COLONOSCOPY  2013    per NG    COLONOSCOPY      2013    HYSTEROSCOPY  2017    myosure polypectomy    KNEE ARTHROSCOPY Right     x3 last     ROSI LOCALIZATION WIRE 1 SITE LEFT (CPT=19281)      ROSI LOCALIZATION WIRE 1 SITE RIGHT (CPT=19281)      OTHER SURGICAL HISTORY       arthroscopic surgery 3 times right knee meniscal tear    RADIATION Bilateral 04/15/2019    recent radiation tx for breast cancer    REDUCTION LEFT  2014    REDUCTION OF LARGE BREAST Bilateral 2014    REDUCTION RIGHT  2014      Family History   Adopted: Yes   Family history unknown: Yes      Social History     Socioeconomic History    Marital status:    Tobacco Use    Smoking status: Former Packs/day: 0.00     Years: 0.00     Additional pack years: 0.00     Total pack years: 0.00     Types: Cigarettes     Quit date: 1986     Years since quittin.3     Passive exposure: Past    Smokeless tobacco: Never    Tobacco comments:     I have not smoked in over 35 years   Vaping Use    Vaping Use: Never used   Substance and Sexual Activity    Alcohol use: Yes     Alcohol/week: 2.0 standard drinks of alcohol     Types: 2 Cans of beer per week     Comment: minimal consumption    Drug use: No   Other Topics Concern    Caffeine Concern Yes     Comment: Coffee, 1 cups daily; Tea;     Exercise Yes     Comment: walking, yoga    Grew up on a farm No    History of tanning Yes    Outdoor occupation No    Pt has a pacemaker No    Pt has a defibrillator No    Breast feeding No    Reaction to local anesthetic No    Left Handed No    Right Handed Yes    Currently spends a great deal of time in the sun No    Hx of Spending [place] of Time in [place] No    Bad sunburns in the past No    Tanning Salons in the Past No    Hx of Radiation Treatments Yes   Social History Narrative    The patient does not use an assistive device. .      The patient does live in a home with stairs. Current Outpatient Medications   Medication Sig Dispense Refill    fluorouracil 0.5 % External Cream Apply thin film to affected area 2 times weekly x 6 weeks as directed for actinic keratoses 30 g 2    ALPRAZolam 0.5 MG Oral Tab Take 1 tablet (0.5 mg total) by mouth 2 (two) times daily as needed for Anxiety. 60 tablet 0    Diethylpropion HCl 25 MG Oral Tab Take 1 tablet by mouth 2 (two) times daily. pantoprazole 40 MG Oral Tab EC Take 1 tablet (40 mg total) by mouth daily. metoprolol succinate  MG Oral Tablet 24 Hr Take 1 tablet (100 mg total) by mouth daily. lisinopril 10 MG Oral Tab Take 1 tablet (10 mg total) by mouth daily. Efinaconazole (JUBLIA) 10 % External Solution Apply 1 Application.  topically 3 (three) times a week. 8 mL 3    Azelastine HCl 137 MCG/SPRAY Nasal Solution 2 sprays by Nasal route 2 (two) times daily. 30 mL 5    anastrozole 1 MG Oral Tab tab Take 1 tablet (1 mg total) by mouth daily. Probiotic Product (ALIGN OR) Take by mouth. PEG 3350-KCl-Na Bicarb-NaCl 420 g Oral Recon Soln Take prep per GI office instructions. May substitute with Trilyte/generic if needed 4000 mL 0    pantoprazole 20 MG Oral Tab EC Take 1 tablet (20 mg total) by mouth daily. Cetirizine HCl (ZYRTEC ALLERGY) 10 MG Oral Cap       PATADAY 0.7 % Ophthalmic Solution INSTILL 1 DROP INTO BOTH EYES ONCE A DAY AS NEEDED FOR ALLERGY RELIEF       Allergies:     Banana                  HIVES  Cantaloupe              HIVES  Cephalosporins          HIVES  Sulfamethoxazole        HIVES  Trimethoprim            HIVES  Milk-Related Compou*    PAIN    Comment:Stomach pain, diarrhea  Dust Mite Extract       ITCHING  Penicillins             HIVES, RASH    Past Medical History:   Diagnosis Date    Allergic rhinitis     Anesthesia complication     blood pressure bottomed out - 6-8 hrs after surgery. Revived on own when staff moved pt.     Anxiety     Anxiety state     Cancer ([facility] [address]     Breast Cancer DX     Chronic rhinitis     Depression     History of neuroma 2011    \"Neuroma 3 rt\"    Melanoma in situ ([facility] [address] 2022    Left chest    Metatarsalgia 2011    \"modified insole / rx naprosyn\"    LAURY on CPAP     PONV (postoperative nausea and vomiting)     Right knee injury     Arthroscopy    Visual impairment     glasses/contacts,dry eye     Past Surgical History:   Procedure Laterality Date          x 4    COLONOSCOPY  2013    per NG    COLONOSCOPY      2013    HYSTEROSCOPY  2017    myosure polypectomy    KNEE ARTHROSCOPY Right     x3 last     ROSI LOCALIZATION WIRE 1 SITE LEFT (CPT=19281)  2010    ROSI LOCALIZATION WIRE 1 SITE RIGHT (CPT=19281)  2009    OTHER SURGICAL HISTORY       arthroscopic surgery 3 times right knee meniscal tear    RADIATION Bilateral 04/15/2019    recent radiation tx for breast cancer    REDUCTION LEFT  2014    REDUCTION OF LARGE BREAST Bilateral 2014    REDUCTION RIGHT  2014     Social History    Socioeconomic History      Marital status:       Spouse name: Not on file      Number of children: Not on file      Years of education: Not on file      Highest education level: Not on file    Occupational History      Not on file    Tobacco Use      Smoking status: Former        Packs/day: 0.00        Years: 0.00        Additional pack years: 0.00        Total pack years: 0.00        Types: Cigarettes        Quit date: 1986        Years since quittin.3        Passive exposure: Past      Smokeless tobacco: Never      Tobacco comments: I have not smoked in over 35 years    Vaping Use      Vaping Use: Never used    Substance and Sexual Activity      Alcohol use:  Yes        Alcohol/week: 2.0 standard drinks of alcohol        Types: 2 Cans of beer per week        Comment: minimal consumption      Drug use: No      Sexual activity: Not on file    Other Topics      Concerns:         Service: Not Asked        Blood Transfusions: Not Asked        Caffeine Concern: Yes          Coffee, 1 cups daily; Tea;         Occupational Exposure: Not Asked        Hobby Hazards: Not Asked        Sleep Concern: Not Asked        Stress Concern: Not Asked        Weight Concern: Not Asked        Special Diet: Not Asked        Back Care: Not Asked        Exercise: Yes          walking, yoga        Bike Helmet: Not Asked        Seat Belt: Not Asked        Self-Exams: Not Asked        Grew up on a farm: No        History of tanning: Yes        Outdoor occupation: No        Pt has a pacemaker: No        Pt has a defibrillator: No        Breast feeding: No        Reaction to local anesthetic: No        Left Handed: No        Right Handed: Yes        Currently spends a great deal of time in the sun: No Past Sunlamp Treatments for Acne: Not Asked        Hx of Spending [place] of Time in [place]: No        Bad sunburns in the past: No        Tanning Salons in the Past: No        Hx of Radiation Treatments: Yes        Regular use of sun block: Not Asked    Social History Narrative      The patient does not use an assistive device. .        The patient does live in a home with stairs. Social Determinants of Health  Financial Resource Strain: Not on file  Food Insecurity: Not on file  Transportation Needs: Not on file  Physical Activity: Not on file  Stress: Not on file  Social Connections: Not on file  Housing Stability: Not on file  Family History   Adopted: Yes   Family history unknown: Yes       There were no vitals filed for this visit. HPI:    Patient presents with:  Full Skin Exam: LOV 8/18/23; Hx of melanoma; she notes a cold sore-like spot on right to mid upper lip, present for approx 2 months; denies bleeding, notes mild tenderness. Also notes a spot on right medial pinky finger. She is using Jublia for the fungus on the toenail, notes improvement. She is ok to do FSE today         Patient adopted unknown family history  Patient here for follow-up history melanoma in situ at left chest post wide excision  Concern with lesion at lateral orbit  No prior Personal history of skin cancer or atypical lesions removed. Lots of sun exposure in the past.  Using Vichy product helping. Overall doing well careful sun protection. Notes lesion left ear left shoulder    Concerns as above    Patient presents with concerns above. Patient has been in their usual state of health. History, medications, allergies reviewed as noted. ROS:  Denies any other systemic complaints. No new or changeing lesions other than noted above. No fevers, chills, night sweats, unusual sun sensitivity. No other skin complaints. History, medications, allergies reviewed as noted.        Physical Examination: Well-developed well-nourished patient alert oriented in no acute distress. Exam total-body performed, including scalp, head, neck, face,nails, hair, external eyes, including conjunctival mucosa, eyelids, lips external ears, back, chest,/ breasts, axillae,  abdomen, arms, legs, palms. Multiple light to medium brown, well marginated, uniformly pigmented, macules and papules 6 mm and less scattered on exam. pigmented lesions examined with dermoscopy benign-appearing patterns. Waxy tannish keratotic papules scattered, cherry-red vascular papules scattered. See map today's date for lesions noted . Otherwise remarkable for lesions as noted on map. See details of examination  See Assessment /Plan for additional history and physical exam also:    Assessment / plan:    No orders of the defined types were placed in this encounter. Meds & Refills for this Visit:  Requested Prescriptions     Signed Prescriptions Disp Refills    fluorouracil 0.5 % External Cream 30 g 2     Sig: Apply thin film to affected area 2 times weekly x 6 weeks as directed for actinic keratoses         Ak (actinic keratosis)  (primary encounter diagnosis)  Lentigo  Seborrheic keratoses  Malignant melanoma in situ of skin of anterior chest (hcc)  Benign neoplasm of scalp and skin of neck  Multiple nevi  Benign neoplasm of skin of face  Benign neoplasm of skin of upper limb, including shoulder, unspecified laterality  Benign neoplasm of skin of trunk  Benign neoplasm of skin of lower limb, including hip, unspecified laterality  Malignant neoplasm of upper-inner quadrant of left breast in female, estrogen receptor positive     See details on map. Remarkable for:  Erythematous scaling keratotic papules diffusely over the upper lip along the vermilion border, more keratotic lesion at cupids bow, toward nasolabial fold.   Background of smaller scaling lesions scattered over upper cutaneous lip and up to vermilion border diffusely in this area  Erythematous scaling keratotic papules noted at sites noted on map  Actinic Keratoses. Precancerous nature discussed. Sun protection, sunscreen/ blocks encouraged Lesions treated with cryo- . Biopsy if not resolved. X3 to above lesions vermilion border, left upper lip left nasolabial fold  We will use topicals to base ideally fluorouracil patient concern regarding crusting if 0.5 not covered consider higher dose or imiquimod      Fluorouracil 0.5% cream  Discussed options patient anxious regarding scarring and potential permanent dyschromia scarring in highly visible area from cryo    Actinic keratoses. Precancerous nature discussed. Treatment options reviewed at length we will proceed with topical therapy with fluorouracil no twice weekly for   6   week course  of actual medication use and may alternate weeks if necessary. Increased redness scaling crusting irritation pain tenderness potential discussed. Anticipate one month for resolution of symptoms. Plan recheck in one month after completion of treatment course. Consider alternative treatment biopsy if not resolved. Otherwise no new atypical appearing lesions  For actinic keratoses--pt with history of melanoma, at high risk for nonmelanoma skin cancers, history of breast cancer active actinic keratoses. Area is diffuse over the upper lip. Patient very concerned regarding potential for allergy, swelling with imiquimod, concern regarding side effects from higher dose cream.  Fluorouracil solution is inappropriate in this location. Medically necessary for management of patient's actinic keratoses and a delicate area diffusely over the upper lip extending to the vermilion border patient at high risk for abnormal scarring    Extensive sun damage, numerous pigmented lesions    Lesion left ear, shoulder benign keratoses reassurance.   No atypical features   Exam otherwise unchanged  right mid back, shave biopsy:  -Compound lentiginous nevus10/22 no recurrence     left chest melanoma in situ6/22  Patient post wide excision melanoma in situ healed well. Continue careful sun protection regular follow-up    Lesion of concern right lateral orbit, temple benign lentigo continue monitoring appears unchanged  Lesion at right lateral orbit tan patch consistent with lentigo recommend observation. Lumbar back lesions benign keratoses more lesions however noted previously patient unchanged. Continue monitoring    Remaining pigmented lesions without evidence of atypical changes no significant changes in exam.         plan follow-up for full skin exam in 3 to 4 months    Waxy tan keratotic papules lesions in areas of concern as noted reassurance given. Benign nature discussed. Possibility of cryo, alphahydroxy acids over-the-counter retinol's discussed. Lentigo left lateral thigh observe. Moderate sun damage with extensive lentigines keratoses. Given history of breast cancer, sun exposure at least annual follow-up in future. Patient adopted family history unknown careful sun protection, further plans pending pathology  Patient at high risk for both melanoma and nonmelanoma skin cancers new lesions. Continue careful monitoring every 4 months. History of verrucoid keratosis right forearm no recurrence    Waxy tan keratotic papule left temple reassurance regarding benign seborrheic keratosis reassurance given    Scattered keratoses back chest neck shoulders no suspicious lesions multiple lentigines reassurance continue sun protection    Few milia. Most prominent tong-nasally, cheeks and forehead on examination multiple whitish dome-shaped smooth papules are noted. Milia diagnosis , pathophysiology discussed. Over-the-counter retinol products may cause peeling irritation. Differin gel over-the-counter consider prescription Retin-A if worsening. No new suspicious lesions    Multiple nevi no significant changes. Continue regular skin checks. Follow-up 1 year or as needed    Please refer to map for specific lesions. See additional diagnoses. Pros cons of various therapies, risks benefits discussed. Pathophysiology discussed with patient. Therapeutic options reviewed. See  Medications in grid. Instructions reviewed at length. Benign nevi, seborrheic  keratoses, cherry angiomas:  Reassurance regarding other benign skin lesions. Signs and symptoms of skin cancer, ABCDE's of melanoma discussed with patient. Sunscreen use, sun protection, self exams reviewed. Followup as noted RTC routine checkup 6 mos - one year or p.r.n. Encounter Times   Including precharting, reviewing chart, prior notes obtaining history: 10 minutes, medical exam :10 minutes, notes on body map, plan, counseling 10minutes My total time spent caring for the patient on the day of the encounter: 30 minutes     The patient indicates understanding of these issues and agrees to the plan. The patient is asked to return as noted in follow-up/ above. This note was generated using Dragon voice recognition software. Please contact me regarding any confusion resulting from errors in recognition.

## 2023-10-31 NOTE — TELEPHONE ENCOUNTER
For actinic keratoses--pt with history of melanoma, With background sun damage at high risk forNon-melanoma skin cancers. Active actinic keratoses. Area is diffuse over the upper lip. Patient very concerned regarding potential for allergy, swelling with imiquimod, concern regarding side effects from higher dose cream.  Fluorouracil solution is inappropriate in this location.   Medically necessary for management of patient's actinic keratoses and a delicate area diffusely over the upper lip extending to the vermilion border patient at high risk for abnormal scarring

## 2023-10-31 NOTE — TELEPHONE ENCOUNTER
Patient called    Was seen on 10/27/23 and a chemo cream was going to be called in. Pharmacy states a PA is required for this medication.  Please call Drysol Counseling:  I discussed with the patient the risks of drysol/aluminum chloride including but not limited to skin rash, itching, irritation, burning.

## 2023-11-01 NOTE — TELEPHONE ENCOUNTER
Medication PA Requested: fluorouracil 0.5 % External Cream                                                          CoverMyMeds Used:  Yes  Key: [id number]  Quantity: 30gm  Day Supply: 30  Sig: Apply thin film to affected area 2 times weekly x 6 weeks as directed for actinic keratoses   DX Code:     L57.0                                CPT code (if applicable):   Case Number/Pending Ref#:       CMM pending, LOV 10/27  Awaiting determination

## 2023-11-01 NOTE — TELEPHONE ENCOUNTER
Medication PA Requested: fluorouracil 0.5 % External Cream                                                          CoverMyMeds Used:  Key:   Quantity: 30gm  Day Supply: 30  Sig: Apply thin film to affected area 2 times weekly x 6 weeks as directed for actinic keratoses   DX Code:     L57.0                                CPT code (if applicable):   Case Number/Pending Ref#:

## 2023-11-02 RX ORDER — FLUOROURACIL 50 MG/G
CREAM TOPICAL
Qty: 40 G | Refills: 0 | Status: SHIPPED | OUTPATIENT
Start: 2023-11-02

## 2023-11-02 NOTE — TELEPHONE ENCOUNTER
Dr. Alicia Colmenares - Fluorouracil 0.5% cream not covered. Covered alternatives include:  Fluorouracil 5% cream or [place]. Please advise. Thank you.

## 2023-11-03 NOTE — TELEPHONE ENCOUNTER
Patient contacted and message below given. Patient sates she already receive the kit in the mail. No further questions at this time.

## 2023-11-11 NOTE — TELEPHONE ENCOUNTER
Pt already received the kit when she was called on 11/3/23 per TE 10/16/23. No further action required, TE closed. · Med Name & Dose: Lantus 50 units nightly-Pen injector.  · Last refill was 3/4/19 Number of Refills sent: 2  · Quantity of medication given 90 day supply  · Last visit for that condition 10/16/19  · Date of next appt: 11/20/2019  · Date of any Lab results pertaining to medication: A1C on 9/30/19  · Refilled per protocol: Yes

## 2023-11-13 ENCOUNTER — TELEPHONE (OUTPATIENT)
Facility: CLINIC | Age: 63
End: 2023-11-13

## 2023-11-13 ENCOUNTER — OFFICE VISIT (OUTPATIENT)
Dept: PHYSICAL MEDICINE AND REHAB | Facility: CLINIC | Age: 63
End: 2023-11-13
Payer: MEDICAID

## 2023-11-13 VITALS — WEIGHT: 182 LBS | RESPIRATION RATE: 18 BRPM | HEIGHT: 68 IN | BODY MASS INDEX: 27.58 KG/M2

## 2023-11-13 DIAGNOSIS — M17.11 PRIMARY OSTEOARTHRITIS OF RIGHT KNEE: Primary | ICD-10-CM

## 2023-11-13 DIAGNOSIS — Z12.11 SPECIAL SCREENING FOR MALIGNANT NEOPLASMS, COLON: Primary | ICD-10-CM

## 2023-11-13 PROCEDURE — 3008F BODY MASS INDEX DOCD: CPT | Performed by: PHYSICAL MEDICINE & REHABILITATION

## 2023-11-13 PROCEDURE — 99214 OFFICE O/P EST MOD 30 MIN: CPT | Performed by: PHYSICAL MEDICINE & REHABILITATION

## 2023-11-13 NOTE — TELEPHONE ENCOUNTER
Dr Ly Baez,    [employer],    Received fax from Laina Andrews,    Cologuard negative    Cologuard updated in health maintenance    Message sent to pt outreach to repeat Cologuard in 3 years    OV with Fracisco De Oliveira on 10/13/2023    Colonoscopy scheduled with Dr Ly Baez & then pt requested a Cologuard which you said was ok.  See TE from 10/16/2023    Pt decided to have a Cologuard and the test came back negative    Pt asking to cancel colonoscopy    Surgical change request sent to [address] [place] [address] Endoscopy and appt cancelled in EPIC

## 2023-11-20 ENCOUNTER — TELEPHONE (OUTPATIENT)
Dept: PHYSICAL MEDICINE AND REHAB | Facility: CLINIC | Age: 63
End: 2023-11-20

## 2023-11-20 RX ORDER — DIETHYLPROPION HYDROCHLORIDE 25 MG/1
1 TABLET ORAL 2 TIMES DAILY
Qty: 60 TABLET | Refills: 0 | Status: SHIPPED | OUTPATIENT
Start: 2023-11-20

## 2023-11-20 NOTE — TELEPHONE ENCOUNTER
Per CMS Guidelines No Auth required for Right knee aspiration and injection with Gel-one CPT Code 13134,    Status: Auth not required    Per CMS Guidelines for HA injections Various Hyaluronan preparations (viscosupplementation) for intra-articular injections of the knee are considered reasonable and necessary when ALL of the following criteria documented in the medical record are met:    1. Symptomatic OA of the knee. Pain that interferes with functional activities (such as, ambulation and prolonged standing). 2. The diagnosis is supported by radiographic evidence of OA of the knee, for example, joint space narrowing, subchondral sclerosis, osteophytes, and subchondral cysts. 3. Trial and failure or contraindication of at least 3 months of conservative therapy:    Non-pharmacologic therapy (e.g., physical therapy, exercise, weight management, self-management programs, knee brace, cane)  Pharmacologic therapy (e.g., acetaminophen, nonsteroidal anti-inflammatory drugs (NSAIDs) (oral, topical), topical capsaicin)  4. Failure of or contraindication to intra-articular glucocorticoid injections.     For patients who have responded to a prior series, a REPEAT* series of viscosupplement therapy is considered reasonable and necessary when ALL of the following are met:    Patient continues to meet initial criteria  Symptoms have recurred  Patient has experienced improvement in pain and functional capacity following the previous series of injections  At least 6 months have elapsed since the prior series of injections

## 2023-11-27 ENCOUNTER — OFFICE VISIT (OUTPATIENT)
Dept: SURGERY | Facility: CLINIC | Age: 63
End: 2023-11-27
Payer: MEDICAID

## 2023-11-27 VITALS
BODY MASS INDEX: 28.49 KG/M2 | OXYGEN SATURATION: 98 % | HEIGHT: 68 IN | WEIGHT: 188 LBS | HEART RATE: 76 BPM | DIASTOLIC BLOOD PRESSURE: 84 MMHG | SYSTOLIC BLOOD PRESSURE: 136 MMHG

## 2023-11-27 DIAGNOSIS — G47.33 OSA ON CPAP: ICD-10-CM

## 2023-11-27 DIAGNOSIS — Z51.81 ENCOUNTER FOR THERAPEUTIC DRUG MONITORING: ICD-10-CM

## 2023-11-27 DIAGNOSIS — I10 HTN (HYPERTENSION), BENIGN: Primary | ICD-10-CM

## 2023-11-27 DIAGNOSIS — E78.5 DYSLIPIDEMIA: ICD-10-CM

## 2023-11-27 DIAGNOSIS — E66.3 OVERWEIGHT (BMI 25.0-29.9): ICD-10-CM

## 2023-11-27 PROCEDURE — 99214 OFFICE O/P EST MOD 30 MIN: CPT | Performed by: INTERNAL MEDICINE

## 2023-11-27 PROCEDURE — 3008F BODY MASS INDEX DOCD: CPT | Performed by: INTERNAL MEDICINE

## 2023-11-27 PROCEDURE — 3079F DIAST BP 80-89 MM HG: CPT | Performed by: INTERNAL MEDICINE

## 2023-11-27 PROCEDURE — 3075F SYST BP GE 130 - 139MM HG: CPT | Performed by: INTERNAL MEDICINE

## 2023-11-27 RX ORDER — PHENTERMINE HYDROCHLORIDE 15 MG/1
15 CAPSULE ORAL EVERY MORNING
Qty: 30 CAPSULE | Refills: 3 | Status: SHIPPED | OUTPATIENT
Start: 2023-11-27

## 2023-11-28 ENCOUNTER — OFFICE VISIT (OUTPATIENT)
Dept: PHYSICAL MEDICINE AND REHAB | Facility: CLINIC | Age: 63
End: 2023-11-28
Payer: MEDICAID

## 2023-11-28 DIAGNOSIS — M17.11 PRIMARY OSTEOARTHRITIS OF RIGHT KNEE: Primary | ICD-10-CM

## 2023-11-28 DIAGNOSIS — M25.561 ACUTE PAIN OF RIGHT KNEE: ICD-10-CM

## 2023-11-28 PROCEDURE — 20610 DRAIN/INJ JOINT/BURSA W/O US: CPT | Performed by: PHYSICAL MEDICINE & REHABILITATION

## 2023-11-28 RX ORDER — LIDOCAINE HYDROCHLORIDE 10 MG/ML
3 INJECTION, SOLUTION INFILTRATION; PERINEURAL ONCE
Status: COMPLETED | OUTPATIENT
Start: 2023-11-28 | End: 2023-11-28

## 2023-11-28 NOTE — PROCEDURES
Procedure:  Right knee aspiration and injection with Hyaluronic acid  The risks, benefits and anticipated outcomes of the procedure, the risks and benefits of the alternatives to the procedure, and the roles and tasks of the personnel to be involved, were discussed with the patient, and the patient consents to the procedure and agrees to proceed. UNIVERSAL PROTOCOL / SAFETY CHECKLIST  Sign in Communication: Completed  Time Out:  Team Confirms the Correct Patient, Correct Procedure, Correct Site and Site Marking, Correct Position (if applicable), Prep and Dry Time (if applicable). The procedure was carried out under sterile prep with  with sterile gel. A 27 ga 1&1/4in needle was introduced and advanced for skin anesthesia with 3 cc of 1% lidocaine. Then, a 18 gauge 1.5 in needle was advanced in the suprapatellar bursa using the superior lateral knee injection approach and 15 cc of synovial fluid was aspirated. Following aspiration, Gel One was injected. The patient tolerated the procedure without complication and was instructed in post-procedure precautions. See patient instructions.     Cynthia Andres DO, MACRINAPMR & CAQSM  Physical Medicine and Rehabilitation/Sports Medicine  MEDICAL CENTER [facility]

## 2023-12-17 RX ORDER — ALPRAZOLAM 0.5 MG/1
0.5 TABLET ORAL 2 TIMES DAILY PRN
Qty: 60 TABLET | Refills: 0 | Status: SHIPPED | OUTPATIENT
Start: 2023-12-17

## 2023-12-17 NOTE — TELEPHONE ENCOUNTER
Please review; protocol failed. Requested Prescriptions   Pending Prescriptions Disp Refills    ALPRAZOLAM 0.5 MG Oral Tab [Pharmacy Med Name: Alprazolam 0.5 Mg Tab Jai] 60 tablet 0     Sig: Take 1 tablet (0.5 mg total) by mouth 2 (two) times daily as needed for Anxiety.        There is no refill protocol information for this order          Future Appointments         Provider Department Appt Notes    In 2 weeks Emre Yancey,  [address], [address], [place] Remove bakers cyst    In 1 month Jenn Booth MD [address], [address] [place], [address]    In 4 months Anya Ma MD [address], [address], [place] MEDICAID  NON SX F/U            Recent Outpatient Visits              2 weeks ago Primary osteoarthritis of right knee    [facility], [address], [place], [place]    Office Visit    2 weeks ago HTN (hypertension), benign    Irene Ramon MD    Office Visit    1 month ago Primary osteoarthritis of right knee    [address], [address] [facility], [place], [place]    Office Visit    1 month ago AK (actinic keratosis)    [facility]tw[facility] Gaffney [place] Jenn Booth MD    Office Visit    2 months ago Colon cancer screening    [address] [place] [address] Mona Valles, ANGEL    Office Visit

## 2024-01-05 ENCOUNTER — LAB ENCOUNTER (OUTPATIENT)
Dept: LAB | Age: 64
End: 2024-01-05
Attending: INTERNAL MEDICINE
Payer: MEDICAID

## 2024-01-05 DIAGNOSIS — R00.2 PALPITATION: ICD-10-CM

## 2024-01-05 DIAGNOSIS — I42.9 CARDIOMYOPATHY (HCC): ICD-10-CM

## 2024-01-05 DIAGNOSIS — I10 HTN (HYPERTENSION): Primary | ICD-10-CM

## 2024-01-05 LAB
ALBUMIN SERPL-MCNC: 4.6 G/DL (ref 3.2–4.8)
ALBUMIN/GLOB SERPL: 1.6 {RATIO} (ref 1–2)
ALP LIVER SERPL-CCNC: 70 U/L
ALT SERPL-CCNC: 23 U/L
ANION GAP SERPL CALC-SCNC: 8 MMOL/L (ref 0–18)
AST SERPL-CCNC: 25 U/L (ref ?–34)
BILIRUB SERPL-MCNC: 0.8 MG/DL (ref 0.2–1.1)
BUN BLD-MCNC: 15 MG/DL (ref 9–23)
BUN/CREAT SERPL: 14.3 (ref 10–20)
CALCIUM BLD-MCNC: 9.7 MG/DL (ref 8.7–10.4)
CHLORIDE SERPL-SCNC: 105 MMOL/L (ref 98–112)
CHOLEST SERPL-MCNC: 241 MG/DL (ref ?–200)
CO2 SERPL-SCNC: 27 MMOL/L (ref 21–32)
CREAT BLD-MCNC: 1.05 MG/DL
EGFRCR SERPLBLD CKD-EPI 2021: 59 ML/MIN/1.73M2 (ref 60–?)
FASTING PATIENT LIPID ANSWER: YES
FASTING STATUS PATIENT QL REPORTED: YES
GLOBULIN PLAS-MCNC: 2.9 G/DL (ref 2.8–4.4)
GLUCOSE BLD-MCNC: 82 MG/DL (ref 70–99)
HDLC SERPL-MCNC: 59 MG/DL (ref 40–59)
LDLC SERPL CALC-MCNC: 160 MG/DL (ref ?–100)
NONHDLC SERPL-MCNC: 182 MG/DL (ref ?–130)
OSMOLALITY SERPL CALC.SUM OF ELEC: 290 MOSM/KG (ref 275–295)
POTASSIUM SERPL-SCNC: 4.2 MMOL/L (ref 3.5–5.1)
PROT SERPL-MCNC: 7.5 G/DL (ref 5.7–8.2)
SODIUM SERPL-SCNC: 140 MMOL/L (ref 136–145)
TRIGL SERPL-MCNC: 126 MG/DL (ref 30–149)
VLDLC SERPL CALC-MCNC: 24 MG/DL (ref 0–30)

## 2024-01-05 PROCEDURE — 80061 LIPID PANEL: CPT

## 2024-01-05 PROCEDURE — 80053 COMPREHEN METABOLIC PANEL: CPT

## 2024-01-05 PROCEDURE — 36415 COLL VENOUS BLD VENIPUNCTURE: CPT

## 2024-01-08 ENCOUNTER — PATIENT MESSAGE (OUTPATIENT)
Dept: PHYSICAL MEDICINE AND REHAB | Facility: CLINIC | Age: 64
End: 2024-01-08

## 2024-01-09 ENCOUNTER — TELEPHONE (OUTPATIENT)
Dept: PHYSICAL MEDICINE AND REHAB | Facility: CLINIC | Age: 64
End: 2024-01-09

## 2024-01-09 ENCOUNTER — OFFICE VISIT (OUTPATIENT)
Dept: PHYSICAL MEDICINE AND REHAB | Facility: CLINIC | Age: 64
End: 2024-01-09
Payer: MEDICAID

## 2024-01-09 VITALS
SYSTOLIC BLOOD PRESSURE: 118 MMHG | HEIGHT: 68 IN | OXYGEN SATURATION: 99 % | HEART RATE: 78 BPM | DIASTOLIC BLOOD PRESSURE: 80 MMHG | WEIGHT: 188 LBS | RESPIRATION RATE: 18 BRPM | BODY MASS INDEX: 28.49 KG/M2

## 2024-01-09 DIAGNOSIS — M17.11 PRIMARY OSTEOARTHRITIS OF RIGHT KNEE: Primary | ICD-10-CM

## 2024-01-09 DIAGNOSIS — M71.21 BAKER'S CYST OF KNEE, RIGHT: ICD-10-CM

## 2024-01-09 PROCEDURE — 20611 DRAIN/INJ JOINT/BURSA W/US: CPT | Performed by: PHYSICAL MEDICINE & REHABILITATION

## 2024-01-09 PROCEDURE — 3079F DIAST BP 80-89 MM HG: CPT | Performed by: PHYSICAL MEDICINE & REHABILITATION

## 2024-01-09 PROCEDURE — 3074F SYST BP LT 130 MM HG: CPT | Performed by: PHYSICAL MEDICINE & REHABILITATION

## 2024-01-09 PROCEDURE — 99214 OFFICE O/P EST MOD 30 MIN: CPT | Performed by: PHYSICAL MEDICINE & REHABILITATION

## 2024-01-09 PROCEDURE — 3008F BODY MASS INDEX DOCD: CPT | Performed by: PHYSICAL MEDICINE & REHABILITATION

## 2024-01-09 RX ORDER — TRIAMCINOLONE ACETONIDE 40 MG/ML
20 INJECTION, SUSPENSION INTRA-ARTICULAR; INTRAMUSCULAR ONCE
Status: COMPLETED | OUTPATIENT
Start: 2024-01-09 | End: 2024-01-09

## 2024-01-09 RX ORDER — LIDOCAINE HYDROCHLORIDE 10 MG/ML
4.5 INJECTION, SOLUTION INFILTRATION; PERINEURAL ONCE
Status: COMPLETED | OUTPATIENT
Start: 2024-01-09 | End: 2024-01-09

## 2024-01-09 NOTE — PROCEDURES
Procedure:  Ultrasound guided RIGHT Baker's cyst aspiration and injection  The risks, benefits and anticipated outcomes of the procedure, the risks and benefits of the alternatives to the procedure, and the roles and tasks of the personnel to be involved, were discussed with the patient, and the patient consents to the procedure and agrees to proceed.  UNIVERSAL PROTOCOL / SAFETY CHECKLIST  Sign in Communication: Completed  Time Out:  Team Confirms the Correct Patient, Correct Procedure, Correct Site and Site Marking, Correct Position (if applicable), Prep and Dry Time (if applicable).    Affirmation of Time Out: YES  Sign Out Discussion: Completed if applicable  The procedure was carried out under sterile prep with sterile gel.  A 27 ga 1&1/2in needle was introduced and advanced with ultrasound guidance for skin anesthesia with 1 cc of 2% lidocaine.  Then, again with real time ultrasound guidance, a 18 gauge 1.5 in needle was advanced in-plane to the middle of the cyst.  Following aspiration 16 cc of cyst material, a mixture of 2 cc of 1% lidocaine and 1 cc of Kenalog (40mg/ml) was injected.  Permanent pictures were taken and stored in the PACS system.    Ultrasound interpretation was performed prior to the procedure to identify the target for injection and any adjacent neurovascular structures.  Subsequently, interpretation was performed during real-time needle guidance confirming placement of the needle at the target.  Post-intervention interpretation was also performed confirming appropriate injectate flow and hemostasis. The patient tolerated the procedure without complication and was instructed in post-procedure precautions.  See patient instructions.    Marcelina Martinez DO, FAAPMR & CAQSM  Physical Medicine and Rehabilitation/Sports Medicine  [facility]

## 2024-01-09 NOTE — PROGRESS NOTES
[facility]  OFFICE FOLLOW UP EVALUATION      HISTORY OF PRESENT ILLNESS:     Chief Complaint   Patient presents with    Cyst     Pt is coming in for removal of bakers cyst, Pt Is coming in for right knee pain that Denies N/T.        Patient is following up for knee pain.  This is persistent with more pain in the back of the knee but also anteriorly.  Pain is worsened with deep flexion.  She denies any changes in strength or fevers or chills.    PHYSICAL EXAM:   /80   Pulse 78   Resp 18   Ht 68\"   Wt 188 lb (85.3 kg)   LMP  (LMP Unknown)   SpO2 99%   BMI 28.59 kg/m²     There is fullness with palpation of the popliteal fossa    IMAGING:     No new imaging    All imaging results were reviewed and discussed with patient.      ASSESSMENT/PLAN:     1. Primary osteoarthritis of right knee    2. Baker's cyst of knee, right        Mirta Miller is a 63 year old female following up for right knee pain with Baker's cyst in the popliteal fossa which was evaluated with ultrasound.  I performed ultrasound-guided aspiration injection with corticosteroid.  Please see procedure note for further details.  She does have pain anteriorly as well and if this does not improve we can consider right knee aspiration injection in the suprapatellar bursa.  Recommend she continue home exercise program and topical treatment.  Follow-up in 4 weeks      The patient verbalized understanding with the plan and was in agreement. All questions/concerns were addressed and there were no barriers to learning.  Please note Dragon dictation software was used to dictate this note and may result in inadvertent typos.    Marcelina Martinez DO, FAAPMR & CAQSM  Physical Medicine and Rehabilitation  Sports and Spine Medicine    PAST MEDICAL HISTORY:     Past Medical History:   Diagnosis Date    Actinic keratosis     Allergic rhinitis     Anesthesia complication     blood pressure bottomed out - 6-8 hrs  after surgery. Revived on own when staff moved pt.    Anxiety     Anxiety state     Cancer (HCC)     Breast Cancer DX     Chronic rhinitis     Depression     Dyslipidemia 2023    History of neuroma 2011    \"Neuroma 3 rt\"    HTN (hypertension), benign 2023    Melanoma in situ (HCC) 2022    Left chest    Metatarsalgia 2011    \"modified insole / rx naprosyn\"    LAURY on CPAP     PONV (postoperative nausea and vomiting)     Right knee injury     Arthroscopy    Visual impairment     glasses/contacts,dry eye         PAST SURGICAL HISTORY:     Past Surgical History:   Procedure Laterality Date          x 4    COLONOSCOPY  2013    per NG    COLONOSCOPY          HYSTEROSCOPY  2017    myosure polypectomy    KNEE ARTHROSCOPY Right     x3 last     ROSI LOCALIZATION WIRE 1 SITE LEFT (CPT=19281)      ORSI LOCALIZATION WIRE 1 SITE RIGHT (CPT=19281)      OTHER SURGICAL HISTORY       arthroscopic surgery 3 times right knee meniscal tear    RADIATION Bilateral 04/15/2019    recent radiation tx for breast cancer    REDUCTION LEFT  2014    REDUCTION OF LARGE BREAST Bilateral 2014    REDUCTION RIGHT  2014         CURRENT MEDICATIONS:     Current Outpatient Medications   Medication Sig Dispense Refill    ALPRAZolam 0.5 MG Oral Tab Take 1 tablet (0.5 mg total) by mouth 2 (two) times daily as needed for Anxiety. 60 tablet 0    Phentermine HCl 15 MG Oral Cap Take 1 capsule (15 mg total) by mouth every morning. 30 capsule 3    fluorouracil 5 % External Cream Use once weekly at night as directed x 8weeks 40 g 0    pantoprazole 40 MG Oral Tab EC Take 1 tablet (40 mg total) by mouth daily.      metoprolol succinate  MG Oral Tablet 24 Hr Take 1 tablet (100 mg total) by mouth daily.      lisinopril 10 MG Oral Tab Take 1 tablet (10 mg total) by mouth daily.      Efinaconazole (JUBLIA) 10 % External Solution Apply 1 Application. topically 3 (three) times a week. 8 mL 3     Azelastine HCl 137 MCG/SPRAY Nasal Solution 2 sprays by Nasal route 2 (two) times daily. 30 mL 5    anastrozole 1 MG Oral Tab tab Take 1 tablet (1 mg total) by mouth daily.      Probiotic Product (ALIGN OR) Take by mouth.           ALLERGIES:     Allergies   Allergen Reactions    Banana HIVES    Cantaloupe HIVES    Cephalosporins HIVES    Sulfamethoxazole HIVES    Trimethoprim HIVES    Milk-Related Compounds PAIN     Stomach pain, diarrhea    Dust Mite Extract ITCHING    Penicillins HIVES and RASH         FAMILY HISTORY:     Family History   Adopted: Yes   Family history unknown: Yes          SOCIAL HISTORY:     Social History     Socioeconomic History    Marital status:    Tobacco Use    Smoking status: Former     Packs/day: 0.00     Years: 0.00     Additional pack years: 0.00     Total pack years: 0.00     Types: Cigarettes     Quit date: 1986     Years since quittin.4     Passive exposure: Past    Smokeless tobacco: Never    Tobacco comments:     I have not smoked in over 35 years   Vaping Use    Vaping Use: Never used   Substance and Sexual Activity    Alcohol use: Yes     Alcohol/week: 2.0 standard drinks of alcohol     Types: 2 Cans of beer per week     Comment: minimal consumption    Drug use: No   Other Topics Concern    Caffeine Concern Yes     Comment: Coffee, 1 cups daily; Tea;     Exercise Yes     Comment: walking, yoga    Grew up on a farm No    History of tanning Yes    Outdoor occupation No    Pt has a pacemaker No    Pt has a defibrillator No    Breast feeding No    Reaction to local anesthetic No    Left Handed No    Right Handed Yes    Currently spends a great deal of time in the sun No    Hx of Spending Great Deal of Time in Sun No    Bad sunburns in the past No    Tanning Salons in the Past No    Hx of Radiation Treatments Yes   Social History Narrative    The patient does not use an assistive device..      The patient does live in a home with stairs.          REVIEW OF SYSTEMS:    A comprehensive 10 point review of systems was completed.  Pertinent positives and negatives noted in the the HPI.      LABS:   No results found for: \"EAG\", \"A1C\"  Lab Results   Component Value Date    WBC 4.6 09/28/2022    RBC 4.70 09/28/2022    HGB 14.5 09/28/2022    HCT 44.5 09/28/2022    MCV 94.7 09/28/2022    MCH 30.9 09/28/2022    MCHC 32.6 09/28/2022    RDW 12.0 09/28/2022    .0 09/28/2022    MPV 9.4 09/22/2018     Lab Results   Component Value Date    GLU 82 01/05/2024    BUN 15 01/05/2024    BUNCREA 14.3 01/05/2024    CREATSERUM 1.05 (H) 01/05/2024    ANIONGAP 8 01/05/2024    GFRNAA 71 10/06/2021    GFRAA 81 10/06/2021    CA 9.7 01/05/2024    OSMOCALC 290 01/05/2024    ALKPHO 70 01/05/2024    AST 25 01/05/2024    ALT 23 01/05/2024    ALKPHOS 46 06/13/2016    BILT 0.8 01/05/2024    TP 7.5 01/05/2024    ALB 4.6 01/05/2024    GLOBULIN 2.9 01/05/2024    AGRATIO 1.5 06/13/2016     01/05/2024    K 4.2 01/05/2024     01/05/2024    CO2 27.0 01/05/2024     No results found for: \"PTP\", \"PT\", \"INR\"  Lab Results   Component Value Date    VITD 72.2 09/28/2022    [id number] 25.7 12/20/2014

## 2024-01-09 NOTE — TELEPHONE ENCOUNTER
Contacted Medicaid Eligibility automated line.  Coverage is active- coverage renewal due date is 5/1/24-patient also has Medicare    Per CMS Guidelines-Authorization is not required based on medical necessity however may be subject to review once claim is submitted.    Ultrasound guided right knee bakers cyst aspiration and injection with corticosteroid CPT 16022,      Status:Approved-authorization is not required per health plan    Inj done in office

## 2024-01-09 NOTE — TELEPHONE ENCOUNTER
From: Mirta Miller  To: Marcelina Martinez  Sent: 1/8/2024 2:38 PM CST  Subject: BAKERS CYST BEHIND RIGHT KNEE    Alfie Melara,    I wanted to know what the procedure would be to have bakers cyst aspirated behind my right knee.   Dr. Martinez wanted me to to come back after last gel injection, thus that is why I made this appointment.    I am not saying I want knee aspirated but just trying to figure out if the gel shot is not working to good or if is the bakers cyst.     If you want me to cancel fine, just let me know.    Thanks,    Michael  [phone]

## 2024-01-22 ENCOUNTER — OFFICE VISIT (OUTPATIENT)
Dept: DERMATOLOGY CLINIC | Facility: CLINIC | Age: 64
End: 2024-01-22
Payer: MEDICAID

## 2024-01-22 ENCOUNTER — TELEPHONE (OUTPATIENT)
Dept: CASE MANAGEMENT | Age: 64
End: 2024-01-22

## 2024-01-22 DIAGNOSIS — Z51.81 MEDICATION MONITORING ENCOUNTER: ICD-10-CM

## 2024-01-22 DIAGNOSIS — L82.1 SEBORRHEIC KERATOSES: ICD-10-CM

## 2024-01-22 DIAGNOSIS — L81.4 LENTIGO: ICD-10-CM

## 2024-01-22 DIAGNOSIS — D23.9 BENIGN NEOPLASM OF SKIN, UNSPECIFIED LOCATION: ICD-10-CM

## 2024-01-22 DIAGNOSIS — D03.59 MALIGNANT MELANOMA IN SITU OF SKIN OF ANTERIOR CHEST (HCC): ICD-10-CM

## 2024-01-22 DIAGNOSIS — Z01.00 ENCOUNTER FOR COMPLETE EYE EXAM: Primary | ICD-10-CM

## 2024-01-22 DIAGNOSIS — L57.0 AK (ACTINIC KERATOSIS): Primary | ICD-10-CM

## 2024-01-22 DIAGNOSIS — D22.9 MULTIPLE NEVI: ICD-10-CM

## 2024-01-22 PROCEDURE — 99213 OFFICE O/P EST LOW 20 MIN: CPT | Performed by: DERMATOLOGY

## 2024-01-22 RX ORDER — ROSUVASTATIN CALCIUM 10 MG/1
TABLET, COATED ORAL
COMMUNITY
Start: 2024-01-10

## 2024-01-22 NOTE — TELEPHONE ENCOUNTER
Dr. Watson,     Patient is requesting a referral to see Dr. Darren Masters for an eye exam.     Patient has Medicaid, please have office fax order to specialist office.     Pended referral please review diagnosis and sign off if you agree.    Thank you.  Angelina Lima  [facility]

## 2024-01-29 NOTE — PROGRESS NOTES
Mirta Miller is a 63 year old female.  HPI:     CC:    Chief Complaint   Patient presents with    Derm Problem     LOV 10/23. Pt present with the recurring spot on her upper lip, was bailee to use fluorouracil 5 % Cream, which helped and believes it's clear.          Allergies:  Banana, Cantaloupe, Cephalosporins, Sulfamethoxazole, Trimethoprim, Milk-related compounds, Dust mite extract, and Penicillins    HISTORY:    Past Medical History:   Diagnosis Date    Actinic keratosis     Allergic rhinitis     Anesthesia complication     blood pressure bottomed out - 6-8 hrs after surgery. Revived on own when staff moved pt.    Anxiety     Anxiety state     Cancer (HCC)     Breast Cancer DX     Chronic rhinitis     Depression     Dyslipidemia 2023    Environmental allergies 1965    Esophageal reflux     History of neuroma 2011    \"Neuroma 3 rt\"    HTN (hypertension), benign 2023    Melanoma in situ (HCC) 2022    Left chest    Metatarsalgia 2011    \"modified insole / rx naprosyn\"    LAURY on CPAP     Osteoarthritis     PONV (postoperative nausea and vomiting)     Right knee injury     Arthroscopy    Sleep apnea 2022    Visual impairment     glasses/contacts,dry eye      Past Surgical History:   Procedure Laterality Date          x 4    COLONOSCOPY  2013    per NG    COLONOSCOPY      2013    HYSTEROSCOPY  2017    myosure polypectomy    KNEE ARTHROSCOPY Right     x3 last     ROSI LOCALIZATION WIRE 1 SITE LEFT (CPT=19281)      ROSI LOCALIZATION WIRE 1 SITE RIGHT (CPT=19281)      OTHER SURGICAL HISTORY       arthroscopic surgery 3 times right knee meniscal tear    RADIATION Bilateral 04/15/2019    recent radiation tx for breast cancer    REDUCTION LEFT  2014    REDUCTION OF LARGE BREAST Bilateral 2014    REDUCTION RIGHT  2014      Family History   Adopted: Yes   Family history unknown: Yes      Social History     Socioeconomic History    Marital status:     Tobacco Use    Smoking status: Former     Packs/day: 0.00     Years: 0.00     Additional pack years: 0.00     Total pack years: 0.00     Types: Cigarettes     Quit date: 1986     Years since quittin.5     Passive exposure: Past    Smokeless tobacco: Never    Tobacco comments:     I have not smoked in over 35 years   Vaping Use    Vaping Use: Never used   Substance and Sexual Activity    Alcohol use: Yes     Alcohol/week: 2.0 standard drinks of alcohol     Types: 2 Cans of beer per week     Comment: minimal consumption    Drug use: No   Other Topics Concern    Caffeine Concern Yes     Comment: Coffee, 1 cups daily; Tea;     Exercise Yes     Comment: walking, yoga    Grew up on a farm No    History of tanning No    Outdoor occupation No    Pt has a pacemaker No    Pt has a defibrillator No    Breast feeding No    Reaction to local anesthetic No    Left Handed No    Right Handed Yes    Currently spends a great deal of time in the sun No    Hx of Spending Great Deal of Time in Sun No    Bad sunburns in the past No    Tanning Salons in the Past No    Hx of Radiation Treatments Yes   Social History Narrative    The patient does not use an assistive device..      The patient does live in a home with stairs.        Current Outpatient Medications   Medication Sig Dispense Refill    rosuvastatin 10 MG Oral Tab rosuvastatin 10 mg tablet, [RxNorm: 195120]      Phentermine HCl 15 MG Oral Cap Take 1 capsule (15 mg total) by mouth every morning. 30 capsule 3    fluorouracil 5 % External Cream Use once weekly at night as directed x 8weeks 40 g 0    pantoprazole 40 MG Oral Tab EC Take 1 tablet (40 mg total) by mouth daily.      metoprolol succinate  MG Oral Tablet 24 Hr Take 1 tablet (100 mg total) by mouth daily.      lisinopril 10 MG Oral Tab Take 1 tablet (10 mg total) by mouth daily.      Efinaconazole (JUBLIA) 10 % External Solution Apply 1 Application. topically 3 (three) times a week. 8 mL 3     Azelastine HCl 137 MCG/SPRAY Nasal Solution 2 sprays by Nasal route 2 (two) times daily. 30 mL 5    anastrozole 1 MG Oral Tab tab Take 1 tablet (1 mg total) by mouth daily.      Probiotic Product (ALIGN OR) Take by mouth.      ALPRAZolam 0.5 MG Oral Tab Take 1 tablet (0.5 mg total) by mouth 2 (two) times daily as needed for Anxiety. 60 tablet 0     Allergies:   Allergies   Allergen Reactions    Banana HIVES    Cantaloupe HIVES    Cephalosporins HIVES    Sulfamethoxazole HIVES    Trimethoprim HIVES    Milk-Related Compounds PAIN     Stomach pain, diarrhea    Dust Mite Extract ITCHING    Penicillins HIVES and RASH       Past Medical History:   Diagnosis Date    Actinic keratosis     Allergic rhinitis     Anesthesia complication     blood pressure bottomed out - 6-8 hrs after surgery. Revived on own when staff moved pt.    Anxiety     Anxiety state     Cancer (HCC)     Breast Cancer DX     Chronic rhinitis     Depression     Dyslipidemia 2023    Environmental allergies 1965    Esophageal reflux     History of neuroma 2011    \"Neuroma 3 rt\"    HTN (hypertension), benign 2023    Melanoma in situ (HCC) 2022    Left chest    Metatarsalgia 2011    \"modified insole / rx naprosyn\"    LAURY on CPAP     Osteoarthritis     PONV (postoperative nausea and vomiting)     Right knee injury     Arthroscopy    Sleep apnea 2022    Visual impairment     glasses/contacts,dry eye     Past Surgical History:   Procedure Laterality Date          x 4    COLONOSCOPY  2013    per NG    COLONOSCOPY      2013    HYSTEROSCOPY  2017    myosure polypectomy    KNEE ARTHROSCOPY Right     x3 last     [facility] LOCALIZATION WIRE 1 SITE LEFT (CPT=19281)      ROSI LOCALIZATION WIRE 1 SITE RIGHT (CPT=19281)      OTHER SURGICAL HISTORY       arthroscopic surgery 3 times right knee meniscal tear    RADIATION Bilateral 04/15/2019    recent radiation tx for breast cancer    REDUCTION LEFT  2014     REDUCTION OF LARGE BREAST Bilateral 2014    REDUCTION RIGHT  2014     Social History     Socioeconomic History    Marital status:      Spouse name: Not on file    Number of children: Not on file    Years of education: Not on file    Highest education level: Not on file   Occupational History    Not on file   Tobacco Use    Smoking status: Former     Packs/day: 0.00     Years: 0.00     Additional pack years: 0.00     Total pack years: 0.00     Types: Cigarettes     Quit date: 1986     Years since quittin.5     Passive exposure: Past    Smokeless tobacco: Never    Tobacco comments:     I have not smoked in over 35 years   Vaping Use    Vaping Use: Never used   Substance and Sexual Activity    Alcohol use: Yes     Alcohol/week: 2.0 standard drinks of alcohol     Types: 2 Cans of beer per week     Comment: minimal consumption    Drug use: No    Sexual activity: Not on file   Other Topics Concern     Service Not Asked    Blood Transfusions Not Asked    Caffeine Concern Yes     Comment: Coffee, 1 cups daily; Tea;     Occupational Exposure Not Asked    Hobby Hazards Not Asked    Sleep Concern Not Asked    Stress Concern Not Asked    Weight Concern Not Asked    Special Diet Not Asked    Back Care Not Asked    Exercise Yes     Comment: walking, yoga    Bike Helmet Not Asked    Seat Belt Not Asked    Self-Exams Not Asked    Grew up on a farm No    History of tanning No    Outdoor occupation No    Pt has a pacemaker No    Pt has a defibrillator No    Breast feeding No    Reaction to local anesthetic No    Left Handed No    Right Handed Yes    Currently spends a great deal of time in the sun No    Past Sunlamp Treatments for Acne Not Asked    Hx of Spending Great Deal of Time in Sun No    Bad sunburns in the past No    Tanning Salons in the Past No    Hx of Radiation Treatments Yes    Regular use of sun block Not Asked   Social History Narrative    The patient does not use an assistive device..       The patient does live in a home with stairs.     Social Determinants of Health     Financial Resource Strain: Not on file   Food Insecurity: Not on file   Transportation Needs: Not on file   Physical Activity: Not on file   Stress: Not on file   Social Connections: Not on file   Housing Stability: Not on file     Family History   Adopted: Yes   Family history unknown: Yes       There were no vitals filed for this visit.    HPI:    Chief Complaint   Patient presents with    Derm Problem     LOV 10/23. Pt present with the recurring spot on her upper lip, was bailee to use fluorouracil 5 % Cream, which helped and believes it's clear.      Follow-up AK's post fluorouracil has noted improvement.  Had been using this only and central upper lip area tolerated no side effects irritation subsided quickly    Patient adopted unknown family history  Patient here for follow-up history melanoma in situ at left chest post wide excision  Concern with lesion at lateral orbit  No prior Personal history of skin cancer or atypical lesions removed.  Lots of sun exposure in the past.  Using Vichy product helping.  Overall doing well careful sun protection.    Notes lesion left ear left shoulder    Concerns as above    Patient presents with concerns above.    Patient has been in their usual state of health.  History, medications, allergies reviewed as noted.      ROS:  Denies any other systemic complaints.  No new or changeing lesions other than noted above. No fevers, chills, night sweats, unusual sun sensitivity.  No other skin complaints.        History, medications, allergies reviewed as noted.       Physical Examination:     Well-developed well-nourished patient alert oriented in no acute distress.  Exam total-body performed, including scalp, head, neck, face,nails, hair, external eyes, including conjunctival mucosa, eyelids, lips external ears, back, chest,/ breasts, axillae,  abdomen, arms, legs, palms.     Multiple light to medium  brown, well marginated, uniformly pigmented, macules and papules 6 mm and less scattered on exam. pigmented lesions examined with dermoscopy benign-appearing patterns.     Waxy tannish keratotic papules scattered, cherry-red vascular papules scattered.    See map today's date for lesions noted .      Otherwise remarkable for lesions as noted on map.  See details of examination  See Assessment /Plan for additional history and physical exam also:    Assessment / plan:    No orders of the defined types were placed in this encounter.      Meds & Refills for this Visit:  Requested Prescriptions      No prescriptions requested or ordered in this encounter         Encounter Diagnoses   Name Primary?    AK (actinic keratosis) Yes    Medication monitoring encounter     Lentigo     Seborrheic keratoses     Multiple nevi     Malignant melanoma in situ of skin of anterior chest (HCC)     Benign neoplasm of skin, unspecified location        See details on map.      Remarkable for:  Actinic keratoses significant improvement at cupids bow patient had not use this over the left upper lip nasolabial fold area will use this there     as well as nasal tip, dorsum.   Actinic keratoses.  Precancerous nature discussed.  Treatment options reviewed at length we will proceed with topical therapy with   Efudex   twice weekly for   6   week course  of actual medication use and may alternate weeks if necessary.  Increased redness scaling crusting irritation pain tenderness potential discussed.  Anticipate one month for resolution of symptoms.  Plan recheck in one month after completion of treatment course.  Consider alternative treatment biopsy if not resolved.    Significant improvement.     No new suspicious lesions otherwise exam unchanged    otherwise no new atypical appearing lesions  For actinic keratoses--pt with history of melanoma, at high risk for nonmelanoma skin cancers, history of breast cancer active actinic keratoses.  Area is  diffuse over the upper lip.  Patient very concerned regarding potential for allergy, swelling with imiquimod, concern regarding side effects from higher dose cream.  Fluorouracil solution is inappropriate in this location.  Medically necessary for management of patient's actinic keratoses and a delicate area diffusely over the upper lip extending to the vermilion border patient at high risk for abnormal scarring    Extensive sun damage, numerous pigmented lesions    Lesion left ear, shoulder benign keratoses reassurance.  No atypical features   Exam otherwise unchanged  right mid back, shave biopsy:  -Compound lentiginous nevus10/22 no recurrence     left chest melanoma in situ6/22  Patient post wide excision melanoma in situ healed well.  Continue careful sun protection regular follow-up    Lesion of concern right lateral orbit, temple benign lentigo continue monitoring appears unchanged  Lesion at right lateral orbit tan patch consistent with lentigo recommend observation.  Lumbar back lesions benign keratoses more lesions however noted previously patient unchanged.  Continue monitoring    Remaining pigmented lesions without evidence of atypical changes no significant changes in exam.         plan follow-up for full skin exam in 3 to 4 months    Waxy tan keratotic papules lesions in areas of concern as noted reassurance given.  Benign nature discussed.  Possibility of cryo, alphahydroxy acids over-the-counter retinol's discussed.    Lentigo left lateral thigh observe.    Moderate sun damage with extensive lentigines keratoses.  Given history of breast cancer, sun exposure at least annual follow-up in future.  Patient adopted family history unknown careful sun protection, further plans pending pathology  Patient at high risk for both melanoma and nonmelanoma skin cancers new lesions.  Continue careful monitoring every 4 months.    History of verrucoid keratosis right forearm no recurrence    Waxy tan keratotic  papule left temple reassurance regarding benign seborrheic keratosis reassurance given    Scattered keratoses back chest neck shoulders no suspicious lesions multiple lentigines reassurance continue sun protection    Few milia.  Most prominent tong-nasally, cheeks and forehead on examination multiple whitish dome-shaped smooth papules are noted.  Milia diagnosis , pathophysiology discussed.  Over-the-counter retinol products may cause peeling irritation.  Differin gel over-the-counter consider prescription Retin-A if worsening.  No new suspicious lesions    Multiple nevi no significant changes.  Continue regular skin checks.  Follow-up 1 year or as needed    Please refer to map for specific lesions.  See additional diagnoses.  Pros cons of various therapies, risks benefits discussed.Pathophysiology discussed with patient.  Therapeutic options reviewed.  See  Medications in grid.  Instructions reviewed at length.    Benign nevi, seborrheic  keratoses, cherry angiomas:  Reassurance regarding other benign skin lesions.Signs and symptoms of skin cancer, ABCDE's of melanoma discussed with patient. Sunscreen use, sun protection, self exams reviewed.  Followup as noted RTC routine checkup 6 mos - one year or p.r.n.  Encounter Times   Including precharting, reviewing chart, prior notes obtaining history: 10 minutes, medical exam :10 minutes, notes on body map, plan, counseling 10minutes My total time spent caring for the patient on the day of the encounter: 30 minutes     The patient indicates understanding of these issues and agrees to the plan.  The patient is asked to return as noted in follow-up/ above.    This note was generated using Dragon voice recognition software.  Please contact me regarding any confusion resulting from errors in recognition.

## 2024-02-05 ENCOUNTER — TELEPHONE (OUTPATIENT)
Dept: INTERNAL MEDICINE CLINIC | Facility: CLINIC | Age: 64
End: 2024-02-05

## 2024-02-05 ENCOUNTER — HOSPITAL ENCOUNTER (OUTPATIENT)
Dept: GENERAL RADIOLOGY | Age: 64
Discharge: HOME | End: 2024-02-05
Attending: STUDENT IN AN ORGANIZED HEALTH CARE EDUCATION/TRAINING PROGRAM
Payer: MEDICAID

## 2024-02-05 ENCOUNTER — OFFICE VISIT (OUTPATIENT)
Dept: INTERNAL MEDICINE CLINIC | Facility: CLINIC | Age: 64
End: 2024-02-05
Payer: MEDICAID

## 2024-02-05 VITALS
WEIGHT: 184 LBS | HEART RATE: 78 BPM | OXYGEN SATURATION: 98 % | BODY MASS INDEX: 27.89 KG/M2 | SYSTOLIC BLOOD PRESSURE: 117 MMHG | HEIGHT: 68 IN | DIASTOLIC BLOOD PRESSURE: 78 MMHG

## 2024-02-05 DIAGNOSIS — J45.991 COUGH VARIANT ASTHMA: Primary | ICD-10-CM

## 2024-02-05 DIAGNOSIS — J45.991 COUGH VARIANT ASTHMA: ICD-10-CM

## 2024-02-05 PROCEDURE — 71046 X-RAY EXAM CHEST 2 VIEWS: CPT | Performed by: STUDENT IN AN ORGANIZED HEALTH CARE EDUCATION/TRAINING PROGRAM

## 2024-02-05 PROCEDURE — 99214 OFFICE O/P EST MOD 30 MIN: CPT | Performed by: STUDENT IN AN ORGANIZED HEALTH CARE EDUCATION/TRAINING PROGRAM

## 2024-02-05 RX ORDER — METHYLPREDNISOLONE 4 MG/1
TABLET ORAL
Qty: 21 EACH | Refills: 0 | Status: SHIPPED | OUTPATIENT
Start: 2024-02-05

## 2024-02-05 RX ORDER — FLUTICASONE PROPIONATE AND SALMETEROL 250; 50 UG/1; UG/1
1 POWDER RESPIRATORY (INHALATION) EVERY 12 HOURS SCHEDULED
Qty: 1 EACH | Refills: 3 | Status: CANCELLED
Start: 2024-02-05

## 2024-02-05 RX ORDER — FLUTICASONE PROPIONATE AND SALMETEROL XINAFOATE 230; 21 UG/1; UG/1
2 AEROSOL, METERED RESPIRATORY (INHALATION) 2 TIMES DAILY
Qty: 1 EACH | Refills: 3 | Status: SHIPPED | OUTPATIENT
Start: 2024-02-05

## 2024-02-05 RX ORDER — ALBUTEROL SULFATE 90 UG/1
AEROSOL, METERED RESPIRATORY (INHALATION)
Qty: 6.7 G | Refills: 9 | Status: SHIPPED | OUTPATIENT
Start: 2024-02-05

## 2024-02-05 NOTE — TELEPHONE ENCOUNTER
Condition update:  Patient reports saw cardiologist Dr Montes 1/10/2024 who advised her to see PCP for chest xray due to cough persisting for 4 months.  Patient denies wheezing.  Patient will be leaving for [place] 2/15/2024 for 6 weeks.  Scheduled appointment today.  Future Appointments   Date Time Provider Department Center   2/5/2024  3:00 PM Anshu Morrow MD [facility]   4/22/2024  2:15 PM [id number]   5/1/2024 12:00 PM [facility]   5/9/2024  1:00 PM Marcelina Martinez, DO [facility]         1/10/2024  Dr Montes, cardiologist  ASSESSMENT    Cough  His cough for 3 months  No wheezing on exam  Asked to follow-up with primary  EF is normal no congestive heart failure symptoms or signsDyslipidemia    Has evidence of atherosclerosis would need LDL less than 100  Will start Crestor 10 mg a day  Side effects of breast cancer hormone treatment may be evident this can be stopped in the summer 2024Obstructive sleep apnea  Wears CPAP at nightCardiomyopathy  EF normalized  EF normalized on PET possibly with medications time or different testing modality 57% EF  Has chronic cough follow-up with primary  Previously: New diagnosis cardiomyopathy with mild LV dysfunction  Beta-blocker and ACE inhibitor Carotid bruits  No stenosis December 2022  Atherosclerosis noted on AAA ultrasound  Mild atherosclerosis no significant stenosis  Risk factor modificationAbdominal aortic atherosclerosis  Mild atherosclerosis diffuse but no stenosis or aneurysm  Risk factor management, needs statin startedPalpitations  Improved  MCT monitor November 2022 0.2% PVCsPlan  Start Crestor 10 mg nightly prescription sent in  Order blood work including CMP, lipid panel prior to next follow-up  Follow-up with primary care for chronic cough  Follow-up 6 monthsRecommend low sodium diet, daily exercise and maintain a normal BMI. Recommend monitor blood pressure at home.Increased BMI:  Provide patient with information regarding diet and lifestyle changes.

## 2024-02-05 NOTE — PROGRESS NOTES
OFFICE NOTE     Patient ID: Mirta Miller is a 63 year old female.  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    Cardiologist Dr. Montes (1/10/2024), with persistent cough, no wheezing. Going to [place] 2/15/2024 for 6 weeks.   Former smoker well over 36years ago), no childhood asthma. Cough for 3-4 months. Pt does not recall any   Cough  This is a recurrent problem. The current episode started more than 1 month ago. The problem has been waxing and waning. The problem occurs constantly. The cough is Non-productive. Associated symptoms include shortness of breath and wheezing. Pertinent negatives include no chest pain, chills, ear congestion, ear pain, fever, headaches, heartburn, hemoptysis, nasal congestion, postnasal drip, rash, rhinorrhea, sore throat, sweats or weight loss.         Vitals:    02/05/24 1452   BP: 117/78   Pulse: 78   SpO2: 98%   Weight: 184 lb (83.5 kg)   Height: 5' 8\" (1.727 m)     body mass index is 27.98 kg/m².  BP Readings from Last 3 Encounters:   02/05/24 117/78   01/09/24 118/80   11/27/23 136/84     The 10-year ASCVD risk score (Humberto DK, et al., 2019) is: 6.1%    Values used to calculate the score:      Age: 63 years      Sex: Female      Is Non- : No      Diabetic: No      Tobacco smoker: No      Systolic Blood Pressure: 117 mmHg      Is BP treated: Yes      HDL Cholesterol: 59 mg/dL      Total Cholesterol: 241 mg/dL      Medications reviewed:  Current Outpatient Medications   Medication Sig Dispense Refill    albuterol 108 (90 Base) MCG/ACT Inhalation Aero Soln Inhale 2-4 puffs into the lungs every 4 to 6 hours as needed for Wheezing or Shortness of Breath  (cough, asthma, chest tightness). FOR ASTHMA. Pharmacist, please switch to formulary alternative per insurance coverage, ok to replace with proair, ventolin, proventil, or any other albuterol inhaler. -drkp 6.7 g 9    fluticasone-salmeterol 230-21 MCG/ACT Inhalation Aerosol Inhale 2 puffs into the lungs 2 (two) times daily. FOR ASTHMA. 1 YEAR SUPPLY. GENERIC. 1 each 3    methylPREDNISolone 4 MG Oral Tablet Therapy Pack Take as directed with food. 21 each 0    ALPRAZolam 0.5 MG Oral Tab Take 1 tablet (0.5 mg total) by mouth 2 (two) times daily as needed for Anxiety. 60 tablet 0    rosuvastatin 10 MG Oral Tab rosuvastatin 10 mg tablet, [RxNorm: 169800]      Phentermine HCl 15 MG Oral Cap Take 1 capsule (15 mg total) by mouth every morning. 30 capsule 3    fluorouracil 5 % External Cream Use once weekly at night as directed x 8weeks 40 g 0    pantoprazole 40 MG Oral Tab EC Take 1 tablet (40 mg total) by mouth daily.      metoprolol succinate  MG Oral Tablet 24 Hr Take 1 tablet (100 mg total) by mouth daily.      lisinopril 10 MG Oral Tab Take 1 tablet (10 mg total) by mouth daily.      Efinaconazole (JUBLIA) 10 % External Solution Apply 1 Application. topically 3 (three) times a week. 8 mL 3    Azelastine HCl 137 MCG/SPRAY Nasal Solution 2 sprays by Nasal route 2 (two) times daily. 30 mL 5    anastrozole 1 MG Oral Tab tab Take 1 tablet (1 mg total) by mouth daily.      Probiotic Product (ALIGN OR) Take by mouth.           Assessment & Plan    1. Cough variant asthma (Primary)  -     Albuterol Sulfate HFA; Inhale 2-4 puffs into the lungs every 4 to 6 hours as needed for Wheezing or Shortness of Breath (cough, asthma, chest tightness). FOR ASTHMA. Pharmacist, please switch to formulary alternative per insurance coverage, ok to replace with proair, ventolin, proventil, or any other albuterol inhaler. -drkp  Dispense: 6.7 g; Refill: 9  -     Fluticasone-Salmeterol; Inhale 2 puffs into the lungs 2 (two) times daily.  FOR ASTHMA. 1 YEAR SUPPLY. GENERIC.  Dispense: 1 each; Refill: 3  -     XR CHEST PA + LAT CHEST (CPT=71046); Future; Expected date: 02/05/2024  -     methylPREDNISolone; Take as directed with food.  Dispense: 21 each; Refill: 0  -     Pulmonary Function Test; Future; Expected date: 02/05/2024  Pt with Subacute cough (3-8 weeks) with ongoing burdensome symptoms  -Obtain CXR for suspected atypical PNA  -Given severity / given PMHx of Asthma, will start medrol dose pack  -Noted Wheezing and/or post viral cough will need to start rescue Albuterol HFA and ICS/LABA inhalation (covered by insurance) and follow up within the next couple to weeks if no improvement.   -I have ordered PFT's (pulmonary function testing) for further evaluation pending above.       Follow Up: As needed/if symptoms worsen or Return in about 8 months (around 10/5/2024), or if symptoms worsen or fail to improve..         Objective/ Results:   Physical Exam  Constitutional:       Appearance: She is well-developed.   Cardiovascular:      Rate and Rhythm: Normal rate and regular rhythm.      Heart sounds: Normal heart sounds.   Pulmonary:      Effort: Pulmonary effort is normal.      Breath sounds: Wheezing (bilateral expiratory wheezing) present.   Abdominal:      General: Bowel sounds are normal.      Palpations: Abdomen is soft.   Skin:     General: Skin is warm and dry.   Neurological:      Mental Status: She is alert and oriented to person, place, and time.      Deep Tendon Reflexes: Reflexes are normal and symmetric.        Reviewed:    Patient Active Problem List    Diagnosis    HTN (hypertension), benign    Overweight (BMI 25.0-29.9)    Dyslipidemia    LAURY on CPAP    Primary osteoarthritis of knees, bilateral    HNP (herniated nucleus pulposus), cervical    Cervical radiculopathy    Spinal stenosis in cervical region    Osteoarthritis of fingers of hands, bilateral    Numbness and tingling in both hands    Arthritis of carpometacarpal (CMC)  joint of both thumbs    Depression, major, single episode, moderate (HCC)    Panic disorder without agoraphobia    Malignant neoplasm of upper-inner quadrant of left breast in female, estrogen receptor positive  (HCC)    LYNETTE (generalized anxiety disorder)    Numerous moles    Diffuse cystic mastopathy    Encounter for therapeutic drug monitoring    Allergic rhinitis    Allergic rhinitis due to pollen      Allergies   Allergen Reactions    Banana HIVES    Cantaloupe HIVES    Cephalosporins HIVES    Sulfamethoxazole HIVES    Trimethoprim HIVES    Milk-Related Compounds PAIN     Stomach pain, diarrhea    Dust Mite Extract ITCHING    Penicillins HIVES and RASH        Social History     Socioeconomic History    Marital status:    Tobacco Use    Smoking status: Former     Packs/day: 0.00     Years: 0.00     Additional pack years: 0.00     Total pack years: 0.00     Types: Cigarettes     Quit date: 1986     Years since quittin.5     Passive exposure: Past    Smokeless tobacco: Never    Tobacco comments:     I have not smoked in over 35 years   Vaping Use    Vaping Use: Never used   Substance and Sexual Activity    Alcohol use: Yes     Alcohol/week: 2.0 standard drinks of alcohol     Types: 2 Cans of beer per week     Comment: minimal consumption    Drug use: No   Other Topics Concern    Caffeine Concern Yes     Comment: Coffee, 1 cups daily; Tea;     Exercise Yes     Comment: walking, yoga    Grew up on a farm No    History of tanning No    Outdoor occupation No    Pt has a pacemaker No    Pt has a defibrillator No    Breast feeding No    Reaction to local anesthetic No    Left Handed No    Right Handed Yes    Currently spends a great deal of time in the sun No    Hx of Spending Great Deal of Time in Sun No    Bad sunburns in the past No    Tanning Salons in the Past No    Hx of Radiation Treatments Yes   Social History Narrative    The patient does not use an assistive device..      The patient does live  in a home with stairs.      Review of Systems   Constitutional: Negative.  Negative for chills, fever and weight loss.   HENT:  Negative for ear pain, postnasal drip, rhinorrhea and sore throat.    Respiratory:  Positive for cough, chest tightness, shortness of breath and wheezing. Negative for hemoptysis.    Cardiovascular: Negative.  Negative for chest pain.   Gastrointestinal: Negative.  Negative for heartburn.   Skin: Negative.  Negative for rash.   Neurological: Negative.  Negative for headaches.     All other systems negative unless otherwise stated in ROS or HPI above.       Anshu Morrow MD  Internal Medicine       Call office with any questions or seek emergency care if necessary.   Patient understands and agrees to follow directions and advice.      ----------------------------------------- PATIENT INSTRUCTIONS-----------------------------------------     There are no Patient Instructions on file for this visit.

## 2024-02-07 NOTE — PROGRESS NOTES
Good morning RN Triage, please relay to patient:  Ruslan Kirby, your chest Xray shows no acute findings of pneumonia. However I do believe it might be due to asthma. Please continue treatments as we discussed and if no improvement follow up in 2 weeks.

## 2024-02-12 ENCOUNTER — HOSPITAL ENCOUNTER (OUTPATIENT)
Dept: RESPIRATORY THERAPY | Facility: HOSPITAL | Age: 64
Discharge: HOME | End: 2024-02-12
Attending: STUDENT IN AN ORGANIZED HEALTH CARE EDUCATION/TRAINING PROGRAM
Payer: MEDICAID

## 2024-02-12 DIAGNOSIS — J45.991 COUGH VARIANT ASTHMA (HCC): ICD-10-CM

## 2024-02-12 PROCEDURE — 94060 EVALUATION OF WHEEZING: CPT | Performed by: INTERNAL MEDICINE

## 2024-02-12 PROCEDURE — 94726 PLETHYSMOGRAPHY LUNG VOLUMES: CPT | Performed by: INTERNAL MEDICINE

## 2024-02-12 PROCEDURE — 94729 DIFFUSING CAPACITY: CPT | Performed by: INTERNAL MEDICINE

## 2024-02-13 ENCOUNTER — PATIENT MESSAGE (OUTPATIENT)
Dept: INTERNAL MEDICINE CLINIC | Facility: CLINIC | Age: 64
End: 2024-02-13

## 2024-02-13 NOTE — PROCEDURES
[facility]    PFT Interpretation    Mirta Miller     1960 MRN [id number]   Height  68 inh  Age 63 year old   Weight  184 lbs  Sex Female         Spirometry:   FEV1 2.24 L which is 82%  FVC 3.22 L which is 92%  FEV1/FVC ratio 70%    Significant bronchodilator response by improving FEV1 50% and greater than 200 cc      FVL:   Scooping at the end of expiratory limb indicating obstructive pattern      Lung Volume:   Total lung capacity 5.73 L which is 101%  Vital capacity 3.30 L which is 94%  RV/TLC ratio is normal      DLCO:   88% which is normal      Impression:   Mild obstructive pattern with significant bronchodilator response and        Thank you for allowing me to participate in the care of your patient.    Yue Olson MD  2024  3:00 PM

## 2024-02-13 NOTE — PROGRESS NOTES
Please relay to patient if not read:   Ruslan Ms. Miller, I reviewed your Pulmonary function test you do have a obstructive pattern (unlikely due from smoking since you quit over 35 years ago) but may attribute but more of an asthma picture. Please continue with inhalers and if no improvement follow up in clinic and discuss if we need to start other medications or refer to lung specialist.

## 2024-02-14 NOTE — TELEPHONE ENCOUNTER
From: Mirta Miller  To: Anshu Morrow  Sent: 2/13/2024 4:14 PM CST  Subject: inhaler    Hi Dr. Morrow,    I got your message that I have asthma and need to stay on inhalers.  I have not used my cpap in over a year, not sure I mentioned that, I can not stand it and am up all night so I stopped.  I got a letter in the mail from insurance stating that they will not fill original inhaler again that I need to get :  Fluticasone Salmeterol/AER /[place] -50 JUSTICE.  I have no idea if this is compatible to what you ordered for me, please advise.    Thank you for your assistance.  Michael

## 2024-02-15 RX ORDER — FLUTICASONE PROPIONATE AND SALMETEROL 100; 50 UG/1; UG/1
2 POWDER RESPIRATORY (INHALATION) 2 TIMES DAILY
Qty: 180 EACH | Refills: 3 | Status: SHIPPED | OUTPATIENT
Start: 2024-02-15

## 2024-03-05 ENCOUNTER — MED REC SCAN ONLY (OUTPATIENT)
Dept: INTERNAL MEDICINE CLINIC | Facility: CLINIC | Age: 64
End: 2024-03-05

## 2024-03-25 DIAGNOSIS — F41.1 GAD (GENERALIZED ANXIETY DISORDER): ICD-10-CM

## 2024-03-26 NOTE — TELEPHONE ENCOUNTER
Please review. Rx failed/no protocol.    Patient's dispense hx: 01/24/24, 12/18/23, and 10/24/23.      Requested Prescriptions   Pending Prescriptions Disp Refills    ALPRAZOLAM 0.5 MG Oral Tab [Pharmacy Med Name: Alprazolam 0.5 Mg Tab Acta] 60 tablet 0     Sig: Take 1 tablet (0.5 mg total) by mouth 2 (two) times daily as needed for Anxiety.       Controlled Substance Medication Failed - 3/25/2024  9:55 AM        Failed - This medication is a controlled substance - forward to provider to refill             Future Appointments         Provider Department Appt Notes    In 3 weeks Massimo Biswas MD [facility] MEDICAID  NON SX F/U    In 1 month Floresita Plascencia MD [facility] followup    In 1 month Marcelina Martinez,  [facility] Pain Management of right knee please.    In 1 month Sonido Gonzalez MD [facility] Cough variant asthma          Recent Outpatient Visits              1 month ago Cough variant asthma (HCC)    [facility] Anshu Morrow MD    Office Visit    2 months ago AK (actinic keratosis)    [facility] Floresita Plascencia MD    Office Visit    2 months ago Primary osteoarthritis of right knee    [facility] Marcelina Martinez, DO    Office Visit    3 months ago Primary osteoarthritis of right knee    [facility] Marcelina Martinez,     Office Visit    4 months ago HTN (hypertension), benign    [facility] Massimo Biswas MD    Office Visit

## 2024-03-27 RX ORDER — ALPRAZOLAM 0.5 MG/1
0.5 TABLET ORAL 2 TIMES DAILY PRN
Qty: 60 TABLET | Refills: 0 | Status: SHIPPED | OUTPATIENT
Start: 2024-03-27

## 2024-04-22 ENCOUNTER — OFFICE VISIT (OUTPATIENT)
Dept: SURGERY | Facility: CLINIC | Age: 64
End: 2024-04-22
Payer: MEDICAID

## 2024-04-22 VITALS
BODY MASS INDEX: 28.04 KG/M2 | HEART RATE: 81 BPM | DIASTOLIC BLOOD PRESSURE: 70 MMHG | HEIGHT: 68 IN | SYSTOLIC BLOOD PRESSURE: 122 MMHG | OXYGEN SATURATION: 95 % | WEIGHT: 185 LBS

## 2024-04-22 DIAGNOSIS — E78.5 DYSLIPIDEMIA: ICD-10-CM

## 2024-04-22 DIAGNOSIS — G47.33 OSA ON CPAP: ICD-10-CM

## 2024-04-22 DIAGNOSIS — E66.3 OVERWEIGHT (BMI 25.0-29.9): ICD-10-CM

## 2024-04-22 DIAGNOSIS — I10 HTN (HYPERTENSION), BENIGN: Primary | ICD-10-CM

## 2024-04-22 PROCEDURE — 99214 OFFICE O/P EST MOD 30 MIN: CPT | Performed by: INTERNAL MEDICINE

## 2024-04-22 RX ORDER — PHENTERMINE HYDROCHLORIDE 15 MG/1
15 CAPSULE ORAL EVERY MORNING
Qty: 30 CAPSULE | Refills: 5 | Status: SHIPPED | OUTPATIENT
Start: 2024-04-22

## 2024-04-22 NOTE — PROGRESS NOTES
[facility], [facility], [place]  [address] [facility] [address] [place] [id number]  Dept: [phone]       Patient:  Mirta Miller  :      1960  MRN:      [id number]    Chief Complaint:    Chief Complaint   Patient presents with    Follow - Up    Weight Management     Weight check        SUBJECTIVE     History of Present Illness:  Mirta Cueto is being seen today for a follow-up for non surgical weight loss.     Past Medical History:   Past Medical History:    Actinic keratosis    Allergic rhinitis    Anesthesia complication    blood pressure bottomed out - 6-8 hrs after surgery. Revived on own when staff moved pt.    Anxiety    Anxiety state    Cancer (HCC)    Breast Cancer DX     Chronic rhinitis    Cough variant asthma (HCC)    Depression    Dyslipidemia    Environmental allergies    Esophageal reflux    History of neuroma    \"Neuroma 3 rt\"    HTN (hypertension), benign    Melanoma in situ (HCC)    Left chest    Metatarsalgia    \"modified insole / rx naprosyn\"    LAURY on CPAP    Osteoarthritis    PONV (postoperative nausea and vomiting)    Right knee injury    Arthroscopy    Sleep apnea    Visual impairment    glasses/contacts,dry eye        Comorbidities:  Back pain-Improvement?  yes, Joint pain-Improvement?  yes, KIMBERLEY-Improvement?  yes, and Snoring-Improvement?  yes    OBJECTIVE     Vitals: /70   Pulse 81   Ht 5' 8\" (1.727 m)   Wt 185 lb (83.9 kg)   LMP  (LMP Unknown)   SpO2 95%   BMI 28.13 kg/m²     Initial weight loss: -03   Total weight loss: -03   Start weight: 188    Wt Readings from Last 3 Encounters:   24 185 lb (83.9 kg)   24 184 lb (83.5 kg)   24 188 lb (85.3 kg)       Patient Medications:    Current Outpatient Medications   Medication Sig Dispense Refill    ALPRAZolam 0.5 MG Oral Tab Take 1 tablet (0.5 mg total) by mouth 2 (two) times daily as needed for Anxiety. 60 tablet 0    fluticasone-salmeterol 100-50 MCG/ACT  Inhalation Aerosol Powder, Breath Activated Inhale 2 puffs into the lungs 2 (two) times daily. 180 each 3    albuterol 108 (90 Base) MCG/ACT Inhalation Aero Soln Inhale 2-4 puffs into the lungs every 4 to 6 hours as needed for Wheezing or Shortness of Breath (cough, asthma, chest tightness). FOR ASTHMA. Pharmacist, please switch to formulary alternative per insurance coverage, ok to replace with proair, ventolin, proventil, or any other albuterol inhaler. -drkp 6.7 g 9    methylPREDNISolone 4 MG Oral Tablet Therapy Pack Take as directed with food. 21 each 0    rosuvastatin 10 MG Oral Tab rosuvastatin 10 mg tablet, [RxNorm: 340500]      Phentermine HCl 15 MG Oral Cap Take 1 capsule (15 mg total) by mouth every morning. 30 capsule 3    fluorouracil 5 % External Cream Use once weekly at night as directed x 8weeks 40 g 0    pantoprazole 40 MG Oral Tab EC Take 1 tablet (40 mg total) by mouth daily.      metoprolol succinate  MG Oral Tablet 24 Hr Take 1 tablet (100 mg total) by mouth daily.      lisinopril 10 MG Oral Tab Take 1 tablet (10 mg total) by mouth daily.      Efinaconazole (JUBLIA) 10 % External Solution Apply 1 Application. topically 3 (three) times a week. 8 mL 3    Azelastine HCl 137 MCG/SPRAY Nasal Solution 2 sprays by Nasal route 2 (two) times daily. 30 mL 5    anastrozole 1 MG Oral Tab tab Take 1 tablet (1 mg total) by mouth daily.      Probiotic Product (ALIGN OR) Take by mouth.       Allergies:  Banana, Cantaloupe, Cephalosporins, Sulfamethoxazole, Trimethoprim, Milk-related compounds, Dust mite extract, and Penicillins     Social History:    Social History     Socioeconomic History    Marital status:      Spouse name: Not on file    Number of children: Not on file    Years of education: Not on file    Highest education level: Not on file   Occupational History    Not on file   Tobacco Use    Smoking status: Former     Current packs/day: 0.00     Types: Cigarettes     Quit date: 7/20/1986      Years since quittin.7     Passive exposure: Past    Smokeless tobacco: Never    Tobacco comments:     I have not smoked in over 35 years   Vaping Use    Vaping status: Never Used   Substance and Sexual Activity    Alcohol use: Yes     Alcohol/week: 2.0 standard drinks of alcohol     Types: 2 Cans of beer per week     Comment: minimal consumption    Drug use: No    Sexual activity: Not on file   Other Topics Concern     Service Not Asked    Blood Transfusions Not Asked    Caffeine Concern Yes     Comment: Coffee, 1 cups daily; Tea;     Occupational Exposure Not Asked    Hobby Hazards Not Asked    Sleep Concern Not Asked    Stress Concern Not Asked    Weight Concern Not Asked    Special Diet Not Asked    Back Care Not Asked    Exercise Yes     Comment: walking, yoga    Bike Helmet Not Asked    Seat Belt Not Asked    Self-Exams Not Asked    Grew up on a farm No    History of tanning No    Outdoor occupation No    Pt has a pacemaker No    Pt has a defibrillator No    Breast feeding No    Reaction to local anesthetic No    Left Handed No    Right Handed Yes    Currently spends a great deal of time in the sun No    Past Sunlamp Treatments for Acne Not Asked    Hx of Spending Great Deal of Time in Sun No    Bad sunburns in the past No    Tanning Salons in the Past No    Hx of Radiation Treatments Yes    Regular use of sun block Not Asked   Social History Narrative    The patient does not use an assistive device..      The patient does live in a home with stairs.     Social Determinants of Health     Financial Resource Strain: Not on file   Food Insecurity: Not on file   Transportation Needs: Not on file   Physical Activity: Not on file   Stress: Not on file   Social Connections: Not on file   Housing Stability: Low Risk  (2023)    [facility]    Housing Stability     Mortgage Payment Concerns?: Not on file     Number of Places Lived in the  Last Year: Not on file     Unstable Housing?: Not on file     Surgical History:    Past Surgical History:   Procedure Laterality Date          x 4    Colonoscopy  2013    per NG    Colonoscopy      2013    Hysteroscopy  2017    myosure polypectomy    Knee arthroscopy Right     x3 last     Maximus localization wire 1 site left (cpt=19281)      Maximus localization wire 1 site right (cpt=19281)      Other surgical history       arthroscopic surgery 3 times right knee meniscal tear    Radiation Bilateral 04/15/2019    recent radiation tx for breast cancer    Reduction left  2014    Reduction of large breast Bilateral 2014    Reduction right  2014     Family History:    Family History   Adopted: Yes   Family history unknown: Yes       Food Journal  Reviewed and Discussed:       Patient has a Food Journal?: yes   Patient is reading nutrition labels?  yes  Average Caloric Intake:     Average CHO Intake: 120  Is patient exercising? yes  Type of exercise? [facility]    Eating Habits  Patient states the following:  Eats 3 meal(s) per day  Length of time it takes to consume a meal:  20  # of snacks per day: 1 Type of snacks:  fruit  Amount of soda consumption per day:    Amount of water (in ounces) per day:  64  Drinking between meals only:  yes  Toughest challenge:      Nutritional Goals  Limit carbohydrates to 100 gms per day, Eat 100-200 calories within 1 hour of waking , and Eat 3-4 cups of fresh fruits or vegetables daily    Behavior Modifications Reviewed and Discussed  Eat breakfast, Eat 3 meals per day, Plan meals in advance, Read nutrition labels, Drink 64 oz of water per day, Maintain a daily food journal, No drinking 30 minutes before or after meals, Utlize portion control strategies to reduce calorie intake, Identify triggers for eating and manage cues, and Eat slowly and take 20 to 30 minutes to complete each meal    Exercise Goals Reviewed and Discussed    Continue [facility]    ROS:    Constitutional:  negative  Respiratory: negative  Cardiovascular: negative  Gastrointestinal: negative  Musculoskeletal:negative  Neurological: negative  Behavioral/Psych: negative  Endocrine: negative  All other systems were reviewed and are negative    Physical Exam:   General appearance: alert, appears stated age, cooperative, and mildly obese  Head: Normocephalic, without obvious abnormality, atraumatic  Back: symmetric, no curvature. ROM normal. No CVA tenderness.  Lungs: clear to auscultation bilaterally  Heart: S1, S2 normal, no murmur, click, rub or gallop, regular rate and rhythm  Abdomen: soft, non-tender; bowel sounds normal; no masses,  no organomegaly  Extremities: extremities normal, atraumatic, no cyanosis or edema  Pulses: 2+ and symmetric  Skin: Skin color, texture, turgor normal. No rashes or lesions  Neurologic: Grossly normal    ASSESSMENT     HYPERTENSION:  The patient's blood pressure has been well controlled.  she has been checking it as instructed and has remained in relatively good control.    HYPERCHOLESTEROLEMIA:  The patient states that her cholesterol has been well controlled on her current medication.    Lab Results   Component Value Date/Time    CHOLEST 241 (H) 01/05/2024 08:50 AM     (H) 01/05/2024 08:50 AM    HDL 59 01/05/2024 08:50 AM    TRIG 126 01/05/2024 08:50 AM    VLDL 24 01/05/2024 08:50 AM       Encounter Diagnosis(ses):   Encounter Diagnoses   Name Primary?    HTN (hypertension), benign Yes    LAURY on CPAP     Dyslipidemia     Overweight (BMI 25.0-29.9)        PLAN     Patient is not interested in bariatric surgery. Patient desires to pursue traditional weight loss at this time.      HYPERTENSION: Blood pressure stable on the above medications. No interval change in antihypertensive medication.     Patient was instructed to continue wearing their CPaP as recommended.     DYSLIPIDEMIA: Stable on the above prescribed meal plan and medication. Liver function stable.    Lab Results    Component Value Date/Time    CHOLEST 241 (H) 01/05/2024 08:50 AM     (H) 01/05/2024 08:50 AM    HDL 59 01/05/2024 08:50 AM    TRIG 126 01/05/2024 08:50 AM    VLDL 24 01/05/2024 08:50 AM       Goals for next month:  1. Keep a food log.  2. Drink 48-64 ounces of non-caloric beverages per day. No fruit juices or regular soda.  3. Increase activity-upper body exercises, walk 10 minutes per day.  4. Increase fruit and vegetable servings to 5-6 per day.      Does not feel Diethylpropion working as well  Switch to Phentermine     PHENTERMINE:refill  at 15 mg  Should get updated EKG    Continue to work out at [facility]    Has good motivation    Follow up with oncology team    Diagnoses and all orders for this visit:    HTN (hypertension), benign    LAURY on CPAP    Dyslipidemia    Overweight (BMI 25.0-29.9)          Massimo Biswas MD

## 2024-04-25 ENCOUNTER — PATIENT MESSAGE (OUTPATIENT)
Dept: PHYSICAL MEDICINE AND REHAB | Facility: CLINIC | Age: 64
End: 2024-04-25

## 2024-04-25 NOTE — TELEPHONE ENCOUNTER
Spoke with Thelma to confirm that this injection had authorization from pt's Medicaid insurance. Reached out to pt over the phone to let her know that we had indeed initiated authorization with her insurance and her insurance responded that no authorization was required, so as far the $765 balance goes, she should call her insurance provider and ask the reason why it was billed to her. Pt expressed understanding and said she reached out to her insurance today and is waiting for a response. Pt will reach out again if she has any other questions.

## 2024-05-01 ENCOUNTER — OFFICE VISIT (OUTPATIENT)
Dept: DERMATOLOGY CLINIC | Facility: CLINIC | Age: 64
End: 2024-05-01
Payer: MEDICAID

## 2024-05-01 DIAGNOSIS — L82.1 SEBORRHEIC KERATOSES: ICD-10-CM

## 2024-05-01 DIAGNOSIS — D22.9 MULTIPLE NEVI: ICD-10-CM

## 2024-05-01 DIAGNOSIS — Z85.820 ENCOUNTER FOR FOLLOW-UP SURVEILLANCE OF MELANOMA: ICD-10-CM

## 2024-05-01 DIAGNOSIS — Z51.81 MEDICATION MONITORING ENCOUNTER: ICD-10-CM

## 2024-05-01 DIAGNOSIS — Z08 ENCOUNTER FOR FOLLOW-UP SURVEILLANCE OF MELANOMA: ICD-10-CM

## 2024-05-01 DIAGNOSIS — L81.4 LENTIGO: ICD-10-CM

## 2024-05-01 DIAGNOSIS — D03.59 MALIGNANT MELANOMA IN SITU OF SKIN OF ANTERIOR CHEST (HCC): ICD-10-CM

## 2024-05-01 DIAGNOSIS — Z13.89 ENCOUNTER FOR SURVEILLANCE OF ABNORMAL NEVI: ICD-10-CM

## 2024-05-01 DIAGNOSIS — L57.0 AK (ACTINIC KERATOSIS): Primary | ICD-10-CM

## 2024-05-12 NOTE — PROGRESS NOTES
Mirta Miller is a 63 year old female.  HPI:     CC:    Chief Complaint   Patient presents with    Derm Problem     Patient present for a follow up visit - lesion on top lip - patient sates she has improvement - LOV 24         Allergies:  Banana, Cantaloupe, Cephalosporins, Sulfamethoxazole, Trimethoprim, Milk-related compounds, Dust mite extract, and Penicillins    HISTORY:    Past Medical History:    Actinic keratosis    Allergic rhinitis    Anesthesia complication    blood pressure bottomed out - 6-8 hrs after surgery. Revived on own when staff moved pt.    Anxiety    Anxiety state    Cancer (HCC)    Breast Cancer DX     Chronic rhinitis    Cough variant asthma (HCC)    Depression    Dyslipidemia    Environmental allergies    Esophageal reflux    History of neuroma    \"Neuroma 3 rt\"    HTN (hypertension), benign    Melanoma in situ (HCC)    Left chest    Metatarsalgia    \"modified insole / rx naprosyn\"    LAURY on CPAP    Osteoarthritis    PONV (postoperative nausea and vomiting)    Right knee injury    Arthroscopy    Sleep apnea    Visual impairment    glasses/contacts,dry eye      Past Surgical History:   Procedure Laterality Date          x 4    Colonoscopy  2013    per NG    Colonoscopy      2013    Hysteroscopy  2017    myosure polypectomy    Knee arthroscopy Right     x3 last     [facility] localization wire 1 site left (cpt=19281)      Maximus localization wire 1 site right (cpt=19281)      Other surgical history       arthroscopic surgery 3 times right knee meniscal tear    Radiation Bilateral 04/15/2019    recent radiation tx for breast cancer    Reduction left  2014    Reduction of large breast Bilateral 2014    Reduction right  2014      Family History   Adopted: Yes   Family history unknown: Yes      Social History     Socioeconomic History    Marital status:    Tobacco Use    Smoking status: Former     Current packs/day: 0.00     Types: Cigarettes     Quit  date: 1986     Years since quittin.8     Passive exposure: Past    Smokeless tobacco: Never    Tobacco comments:     I have not smoked in over 35 years   Vaping Use    Vaping status: Never Used   Substance and Sexual Activity    Alcohol use: Yes     Alcohol/week: 2.0 standard drinks of alcohol     Types: 2 Cans of beer per week     Comment: minimal consumption    Drug use: No   Other Topics Concern    Caffeine Concern Yes     Comment: Coffee, 1 cups daily; Tea;     Exercise Yes     Comment: walking, yoga    Grew up on a farm No    History of tanning No    Outdoor occupation No    Pt has a pacemaker No    Pt has a defibrillator No    Breast feeding No    Reaction to local anesthetic No    Left Handed No    Right Handed Yes    Currently spends a great deal of time in the sun No    Hx of Spending Great Deal of Time in Sun No    Bad sunburns in the past No    Tanning Salons in the Past No    Hx of Radiation Treatments Yes   Social History Narrative    The patient does not use an assistive device..      The patient does live in a home with stairs.     Social Determinants of Health      Received from [facility], [facility]    Housing Stability        Current Outpatient Medications   Medication Sig Dispense Refill    Phentermine HCl 15 MG Oral Cap Take 1 capsule (15 mg total) by mouth every morning. 30 capsule 5    ALPRAZolam 0.5 MG Oral Tab Take 1 tablet (0.5 mg total) by mouth 2 (two) times daily as needed for Anxiety. 60 tablet 0    fluticasone-salmeterol 100-50 MCG/ACT Inhalation Aerosol Powder, Breath Activated Inhale 2 puffs into the lungs 2 (two) times daily. 180 each 3    albuterol 108 (90 Base) MCG/ACT Inhalation Aero Soln Inhale 2-4 puffs into the lungs every 4 to 6 hours as needed for Wheezing or Shortness of Breath (cough, asthma, chest tightness). FOR ASTHMA. Pharmacist, please switch to formulary alternative per insurance coverage, ok to replace with proair,  ventolin, proventil, or any other albuterol inhaler. -drkp 6.7 g 9    methylPREDNISolone 4 MG Oral Tablet Therapy Pack Take as directed with food. 21 each 0    rosuvastatin 10 MG Oral Tab rosuvastatin 10 mg tablet, [RxNorm: 334052]      fluorouracil 5 % External Cream Use once weekly at night as directed x 8weeks 40 g 0    pantoprazole 40 MG Oral Tab EC Take 1 tablet (40 mg total) by mouth daily.      metoprolol succinate  MG Oral Tablet 24 Hr Take 1 tablet (100 mg total) by mouth daily.      lisinopril 10 MG Oral Tab Take 1 tablet (10 mg total) by mouth daily.      Efinaconazole (JUBLIA) 10 % External Solution Apply 1 Application. topically 3 (three) times a week. 8 mL 3    Azelastine HCl 137 MCG/SPRAY Nasal Solution 2 sprays by Nasal route 2 (two) times daily. 30 mL 5    anastrozole 1 MG Oral Tab tab Take 1 tablet (1 mg total) by mouth daily.      Probiotic Product (ALIGN OR) Take by mouth.       Allergies:   Allergies   Allergen Reactions    Banana HIVES    Cantaloupe HIVES    Cephalosporins HIVES    Sulfamethoxazole HIVES    Trimethoprim HIVES    Milk-Related Compounds PAIN     Stomach pain, diarrhea    Dust Mite Extract ITCHING    Penicillins HIVES and RASH       Past Medical History:    Actinic keratosis    Allergic rhinitis    Anesthesia complication    blood pressure bottomed out - 6-8 hrs after surgery. Revived on own when staff moved pt.    Anxiety    Anxiety state    Cancer (HCC)    Breast Cancer DX 12/18    Chronic rhinitis    Cough variant asthma (HCC)    Depression    Dyslipidemia    Environmental allergies    Esophageal reflux    History of neuroma    \"Neuroma 3 rt\"    HTN (hypertension), benign    Melanoma in situ (HCC)    Left chest    Metatarsalgia    \"modified insole / rx naprosyn\"    LAURY on CPAP    Osteoarthritis    PONV (postoperative nausea and vomiting)    Right knee injury    Arthroscopy    Sleep apnea    Visual impairment    glasses/contacts,dry eye     Past Surgical History:    Procedure Laterality Date          x 4    Colonoscopy  2013    per NG    Colonoscopy      2013    Hysteroscopy  2017    myosure polypectomy    Knee arthroscopy Right     x3 last     Maximus localization wire 1 site left (cpt=19281)      Maximus localization wire 1 site right (cpt=19281)      Other surgical history       arthroscopic surgery 3 times right knee meniscal tear    Radiation Bilateral 04/15/2019    recent radiation tx for breast cancer    Reduction left  2014    Reduction of large breast Bilateral 2014    Reduction right  2014     Social History     Socioeconomic History    Marital status:      Spouse name: Not on file    Number of children: Not on file    Years of education: Not on file    Highest education level: Not on file   Occupational History    Not on file   Tobacco Use    Smoking status: Former     Current packs/day: 0.00     Types: Cigarettes     Quit date: 1986     Years since quittin.8     Passive exposure: Past    Smokeless tobacco: Never    Tobacco comments:     I have not smoked in over 35 years   Vaping Use    Vaping status: Never Used   Substance and Sexual Activity    Alcohol use: Yes     Alcohol/week: 2.0 standard drinks of alcohol     Types: 2 Cans of beer per week     Comment: minimal consumption    Drug use: No    Sexual activity: Not on file   Other Topics Concern     Service Not Asked    Blood Transfusions Not Asked    Caffeine Concern Yes     Comment: Coffee, 1 cups daily; Tea;     Occupational Exposure Not Asked    Hobby Hazards Not Asked    Sleep Concern Not Asked    Stress Concern Not Asked    Weight Concern Not Asked    Special Diet Not Asked    Back Care Not Asked    Exercise Yes     Comment: walking, yoga    Bike Helmet Not Asked    Seat Belt Not Asked    Self-Exams Not Asked    Grew up on a farm No    History of tanning No    Outdoor occupation No    Pt has a pacemaker No    Pt has a defibrillator No    Breast feeding No     Reaction to local anesthetic No    Left Handed No    Right Handed Yes    Currently spends a great deal of time in the sun No    Past Sunlamp Treatments for Acne Not Asked    Hx of Spending Great Deal of Time in Sun No    Bad sunburns in the past No    Tanning Salons in the Past No    Hx of Radiation Treatments Yes    Regular use of sun block Not Asked   Social History Narrative    The patient does not use an assistive device..      The patient does live in a home with stairs.     Social Determinants of Health     Financial Resource Strain: Not on file   Food Insecurity: Not on file   Transportation Needs: Not on file   Physical Activity: Not on file   Stress: Not on file   Social Connections: Not on file   Housing Stability: Low Risk  (1/26/2023)    [facility], [facility]    Housing Stability     Mortgage Payment Concerns?: Not on file     Number of Places Lived in the Last Year: Not on file     Unstable Housing?: Not on file     Family History   Adopted: Yes   Family history unknown: Yes       There were no vitals filed for this visit.    HPI:    Chief Complaint   Patient presents with    Derm Problem     Patient present for a follow up visit - lesion on top lip - patient sates she has improvement - LOV 01-22-24     Follow-up AK's post fluorouracil has noted improvement.  Had been using this only and central upper lip area tolerated no side effects irritation subsided quickly  New area came up at left upper lip.  Slightly red still using fluorouracil twice weekly.  Had been careful while away in [place] with sunscreen hat.      Patient adopted unknown family history  Patient here for follow-up history melanoma in situ at left chest post wide excision  Concern with lesion at lateral orbit  No prior Personal history of skin cancer or atypical lesions removed.  Lots of sun exposure in the past.  Using Vichy product helping.  Overall doing well careful sun  protection.    Notes lesion left ear left shoulder    Concerns as above    Patient presents with concerns above.    Patient has been in their usual state of health.  History, medications, allergies reviewed as noted.      ROS:  Denies any other systemic complaints.  No new or changeing lesions other than noted above. No fevers, chills, night sweats, unusual sun sensitivity.  No other skin complaints.        History, medications, allergies reviewed as noted.       Physical Examination:     Well-developed well-nourished patient alert oriented in no acute distress.  Exam total-body performed, including scalp, head, neck, face,nails, hair, external eyes, including conjunctival mucosa, eyelids, lips external ears, back, chest,/ breasts, axillae,  abdomen, arms, legs, palms.     Multiple light to medium brown, well marginated, uniformly pigmented, macules and papules 6 mm and less scattered on exam. pigmented lesions examined with dermoscopy benign-appearing patterns.     Waxy tannish keratotic papules scattered, cherry-red vascular papules scattered.    See map today's date for lesions noted .      Otherwise remarkable for lesions as noted on map.  See details of examination  See Assessment /Plan for additional history and physical exam also:    Assessment / plan:    No orders of the defined types were placed in this encounter.      Meds & Refills for this Visit:  Requested Prescriptions      No prescriptions requested or ordered in this encounter         Encounter Diagnoses   Name Primary?    AK (actinic keratosis) Yes    Medication monitoring encounter     Seborrheic keratoses     Multiple nevi     Lentigo     Malignant melanoma in situ of skin of anterior chest (HCC)     Encounter for follow-up surveillance of melanoma     Encounter for surveillance of abnormal nevi        See details on map.      Remarkable for:      Patient seen for follow-up long-term monitoring, treatment of  Actinic keratoses, abnormal nevi, sun  damage, history of melanoma, history of breast cancer  Requiring ongoing surveillance, monitoring.  See previous notes.  Patient requiring frequent monitoring, long-term surveillance for above diagnoses, office visits every 3 to 4 months, self monitoring and follow-up as needed in the interim.  Plan is continued surveillance as noted and ongoing education    AK's improved.  Still small erythematous area at left nasolabial fold will retreat with fluorouracil twice weekly for 6 weeks  Actinic keratoses.  Precancerous nature discussed.  Treatment options reviewed at length we will proceed with topical therapy with   Efudex   twice weekly for   6   week course  of actual medication use and may alternate weeks if necessary.  Increased redness scaling crusting irritation pain tenderness potential discussed.  Anticipate one month for resolution of symptoms.  Plan recheck in one month after completion of treatment course.  Consider alternative treatment biopsy if not resolved.    Significant improvement.  And AK's continue careful monitoring sun protection    No new suspicious lesions otherwise exam unchanged    otherwise no new atypical appearing lesions  -pt with history of melanoma, at high risk for nonmelanoma skin cancers, history of breast cancer active actinic keratoses.  Extensive sun damage, numerous pigmented lesions    Lesion left ear, shoulder benign keratoses reassurance.  No atypical features   Exam otherwise unchanged  right mid back, shave biopsy:  -Compound lentiginous nevus10/22 no recurrence     left chest melanoma in situ6/22  Patient post wide excision melanoma in situ healed well.  Continue careful sun protection regular follow-up    Lesion of concern right lateral orbit, temple benign lentigo continue monitoring appears unchanged  Lesion at right lateral orbit tan patch consistent with lentigo recommend observation.  Lumbar back lesions benign keratoses more lesions however noted previously patient  unchanged.  Continue monitoring    Remaining pigmented lesions without evidence of atypical changes no significant changes in exam.         plan follow-up for full skin exam in 3 to 4 months    Waxy tan keratotic papules lesions in areas of concern as noted reassurance given.  Benign nature discussed.  Possibility of cryo, alphahydroxy acids over-the-counter retinol's discussed.    Lentigo left lateral thigh observe.    Moderate sun damage with extensive lentigines keratoses.  Given history of breast cancer, sun exposure at least annual follow-up in future.  Patient adopted family history unknown careful sun protection, further plans pending pathology  Patient at high risk for both melanoma and nonmelanoma skin cancers new lesions.  Continue careful monitoring every 4 months.    History of verrucoid keratosis right forearm no recurrence    Waxy tan keratotic papule left temple reassurance regarding benign seborrheic keratosis reassurance given    Scattered keratoses back chest neck shoulders no suspicious lesions multiple lentigines reassurance continue sun protection    Few milia.  Most prominent tong-nasally, cheeks and forehead on examination multiple whitish dome-shaped smooth papules are noted.  Milia diagnosis , pathophysiology discussed.  Over-the-counter retinol products may cause peeling irritation.  Differin gel over-the-counter consider prescription Retin-A if worsening.  No new suspicious lesions    Multiple nevi no significant changes.  Continue regular skin checks.  Follow-up 1 year or as needed    Please refer to map for specific lesions.  See additional diagnoses.  Pros cons of various therapies, risks benefits discussed.Pathophysiology discussed with patient.  Therapeutic options reviewed.  See  Medications in grid.  Instructions reviewed at length.    Benign nevi, seborrheic  keratoses, cherry angiomas:  Reassurance regarding other benign skin lesions.Signs and symptoms of skin cancer, ABCDE's of  melanoma discussed with patient. Sunscreen use, sun protection, self exams reviewed.  Followup as noted RTC routine checkup 6 mos - one year or p.r.n.  Encounter Times   Including precharting, reviewing chart, prior notes obtaining history: 10 minutes, medical exam :10 minutes, notes on body map, plan, counseling 10minutes My total time spent caring for the patient on the day of the encounter: 30 minutes     The patient indicates understanding of these issues and agrees to the plan.  The patient is asked to return as noted in follow-up/ above.    This note was generated using Dragon voice recognition software.  Please contact me regarding any confusion resulting from errors in recognition.

## 2024-05-20 ENCOUNTER — OFFICE VISIT (OUTPATIENT)
Dept: PULMONOLOGY | Facility: CLINIC | Age: 64
End: 2024-05-20

## 2024-05-20 VITALS
WEIGHT: 184 LBS | BODY MASS INDEX: 27.89 KG/M2 | HEIGHT: 68 IN | SYSTOLIC BLOOD PRESSURE: 107 MMHG | HEART RATE: 129 BPM | DIASTOLIC BLOOD PRESSURE: 72 MMHG | OXYGEN SATURATION: 98 %

## 2024-05-20 DIAGNOSIS — G47.33 OSA ON CPAP: Primary | ICD-10-CM

## 2024-05-20 RX ORDER — LOSARTAN POTASSIUM 50 MG/1
50 TABLET ORAL DAILY
Qty: 90 TABLET | Refills: 3 | Status: SHIPPED | OUTPATIENT
Start: 2024-05-20

## 2024-05-20 NOTE — PROGRESS NOTES
Dear Elliot:           As you know, Mirta Cueto is a 63-year-old female who I am now evaluating for sleep disordered breathing and cough.    HISTORY OF PRESENT ILLNESS: Patient notes that her cough began 7 months after starting lisinopril.  It is dry.  She was empirically given a trial of therapy directed toward asthma.  She also has severe obstructive sleep apnea with 47 respiratory events per hour but she was intolerant of CPAP.  She was evaluated at [facility] and was offered the inspire device; however, when she realized that it involves putting wires into her neck, she declined.  She has a clinical syndrome of excessive daytime somnolence without drowsy driving.  There is no cataplexy, sleep paralysis nor hypnagogic hallucination.  There is no urge to move the limbs at night.  She goes to bed at midnight and gets up at 730.  She does have unrefreshing sleep and she will have morning headaches.    PAST MEDICAL AND SURGICAL HISTORY:   1.  Breast cancer status postlumpectomy, radiation therapy hypertension dyslipidemia generalized anxiety    SOCIAL HISTORY: , 3 children, quit tobacco 38 years ago.  Occasional alcohol, retired  for [employer]    FAMILY HISTORY: Mother and father  old age    ALLERGIES TO MEDICATIONS: Cephalosporin and penicillin sulfamethoxazole trimethoprim    MEDICATIONS: Lisinopril rosuvastatin phentermine pantoprazole metoprolol lisinopril 10 mg Advair anastrozole alprazolam albuterol    REVIEW OF SYSTEMS: Review of Systems:  Vision normal. Ear nose and throat normal. Bowel normal. Bladder function normal. No depression. No thyroid disease. No lymphatic system concerns.  No rash. Muscles and joints unremarkable. No weight loss no weight gain.    PHYSICAL EXAMINATION: Physical Examination:  Vital signs normal. HEENT examination is unremarkable with pupils equal round and reactive to light and accommodation. Neck without adenopathy, thyromegaly, JVD nor bruit. Lungs clear to  auscultation and percussion. Cardiac regular rate and rhythm no murmur. Abdomen nontender, without hepatosplenomegaly and no mass appreciable. Extremities and Musculoskeletal without clubbing cyanosis nor edema, and mobility acceptable. Neurologic grossly intact with symmetric tone and strength and reflex.  Crowded oropharynx Mallampati score 3.    LABORATORY: Polysomnography-47 respiratory events per hour    ASSESSMENT AND PLAN:  PROBLEM 1.  Severe obstructive sleep apnea-intolerant of CPAP.  Declines the inspire device.  I think it reasonable to offer oral appliance therapy.  She is significantly symptomatic as cited above.    RECOMMENDATIONS:  1.  Referral to [facility] sleep center for oral appliance therapy  2.  Weight loss  3.  Avoid alcohol  4.  Avoid sedating drug  5.  Never drive if sleepy  6.  Sleep apnea literature provided  7.  Return to see me at the 4-month interval after getting set up with oral appliance therapy and she will need a home sleep test thereafter to assess efficacy.    PROBLEM 2.  Dry cough-I strongly suspect this is related to lisinopril.    RECOMMENDATIONS:  1.  Stop lisinopril and start losartan 50 mg daily and follow-up with you for blood pressure.  2.  Patient to call me in a couple weeks to let me know if the cough resolved with stopping the lisinopril.    I am delighted to assist in Mirta's care.            With warmest regards,     Sonido Gonzalez MD  Medical Director, Critical Care, [facility]  Medical Director, [facility]

## 2024-05-31 DIAGNOSIS — F41.1 GAD (GENERALIZED ANXIETY DISORDER): ICD-10-CM

## 2024-06-03 RX ORDER — ALPRAZOLAM 0.5 MG/1
0.5 TABLET ORAL 2 TIMES DAILY PRN
Qty: 60 TABLET | Refills: 0 | Status: SHIPPED | OUTPATIENT
Start: 2024-06-03

## 2024-06-03 NOTE — TELEPHONE ENCOUNTER
Please review; protocol failed/No Protocol    Recent Fills: 12/18/2023, 01/24/2024,03/27/2024-quantity 60 for 30 day supply    Last Rx Written: 03/27/2024    Last Office Visit: 02/05/2024    Requested Prescriptions   Pending Prescriptions Disp Refills    ALPRAZOLAM 0.5 MG Oral Tab [Pharmacy Med Name: Alprazolam 0.5 Mg Tab Acta] 60 tablet 0     Sig: Take 1 tablet (0.5 mg total) by mouth 2 (two) times daily as needed for Anxiety.       Controlled Substance Medication Failed - 5/31/2024  8:04 AM        Failed - This medication is a controlled substance - forward to provider to refill           Future Appointments         Provider Department Appt Notes    In 1 week Marcelina Martinez, DO [facility] Gel injection needed right knee please- lots of pain.    In 2 months Floresita Plascencia MD [facility] 3 mon f/u    In 2 months Massimo Biswas MD [facility]           Recent Outpatient Visits              2 weeks ago LAURY on CPAP    [facility] Son[facility]    Office Visit    1 month ago AK (actinic keratosis)    [facility] Floresita Plascencia MD    Office Visit    1 month ago HTN (hypertension), benign    [facility] Massimo Biswas MD    Office Visit    3 months ago Cough variant asthma (HCC)    [facility], Anshu Stanley MD    Office Visit    4 months ago AK (actinic keratosis)    [facility] Floresita Plascencia MD    Office Visit

## 2024-06-10 ENCOUNTER — OFFICE VISIT (OUTPATIENT)
Dept: PHYSICAL MEDICINE AND REHAB | Facility: CLINIC | Age: 64
End: 2024-06-10
Payer: MEDICAID

## 2024-06-10 ENCOUNTER — TELEPHONE (OUTPATIENT)
Dept: PHYSICAL MEDICINE AND REHAB | Facility: CLINIC | Age: 64
End: 2024-06-10

## 2024-06-10 VITALS — RESPIRATION RATE: 18 BRPM | BODY MASS INDEX: 27.58 KG/M2 | WEIGHT: 182 LBS | HEIGHT: 68 IN

## 2024-06-10 DIAGNOSIS — M17.11 PRIMARY OSTEOARTHRITIS OF RIGHT KNEE: Primary | ICD-10-CM

## 2024-06-10 RX ORDER — LIDOCAINE HYDROCHLORIDE 10 MG/ML
3 INJECTION, SOLUTION INFILTRATION; PERINEURAL ONCE
Status: COMPLETED | OUTPATIENT
Start: 2024-06-10 | End: 2024-06-10

## 2024-06-10 NOTE — PATIENT INSTRUCTIONS
Steroid Injection Information  What to expect: The injection contains Lidocaine (which numbs the area) and Kenalog (a steroid which decreases inflammation).  You may have pain relief within hours of the injection due to the Lidocaine.  The Kenalog can take a couple days, up to a couple weeks, to reach the full effect.  It is also possible to have a slight increase in symptoms over the first few days, but that should resolve fairly quickly.    How long will the injection last?: The length of response to an injection is variable.  Literally a couple weeks to a couple years.  The injection will decrease the inflammation and the pain will return if/when the inflammation returns.    Activity Recommendations: For the first 24 hours after injection, keep the area clean and dry.  It is ok to shower but don't soak in a tub during that time.  No vigorous activity such as running or heavy lifting for the first week but other than that you can gradually resume your normal activities immediately.  If you have a significant decrease in pain, be careful not to do too much too soon.  Again, the key is GRADUAL resumption of activites.    Things to look out for: Common injection side effects include soreness at the injection site, bruising, flushing of the face or skin, and a temporary increase in your blood sugars and/or blood pressure.  Infection is very rare but please notify my office ([facility] [phone]) if you develop any fevers, drainage from the injection site, or severe increase in pain.  If it is the weekend, go to an Emergency Room.     Marcelina Martinez, DO  Physiatry   [phone]

## 2024-06-10 NOTE — PROCEDURES
Procedure:  Right knee aspiration and injection with Hyaluronic acid  The risks, benefits and anticipated outcomes of the procedure, the risks and benefits of the alternatives to the procedure, and the roles and tasks of the personnel to be involved, were discussed with the patient, and the patient consents to the procedure and agrees to proceed.  UNIVERSAL PROTOCOL / SAFETY CHECKLIST  Sign in Communication: Completed  Time Out:  Team Confirms the Correct Patient, Correct Procedure, Correct Site and Site Marking, Correct Position (if applicable), Prep and Dry Time (if applicable).      The procedure was carried out under sterile prep with  with sterile gel.  A 27 ga 1&1/4in needle was introduced and advanced for skin anesthesia with 3 cc of 1% lidocaine.  Then, a 18 gauge 1.5 in needle was advanced in the suprapatellar bursa using the superior lateral knee injection approach and 9 cc of sanguinous synovial fluid was aspirated.  Following aspiration, gel 1 was injected.  The patient tolerated the procedure without complication and was instructed in post-procedure precautions.  See patient instructions.    Marcelina Martinez DO, FAAPMR & CAQSM  Physical Medicine and Rehabilitation/Sports Medicine  [facility]

## 2024-06-10 NOTE — PROGRESS NOTES
[facility]  OFFICE FOLLOW UP EVALUATION      HISTORY OF PRESENT ILLNESS:     Chief Complaint   Patient presents with    Follow - Up     Returning patient here for follow up. LOV 1/9/24.  Continues pain to R knee. Denies radiation. Locates to lateral aspect, at times to medial. Denies N/T. CPL 3/10. Her pain flares with overuse such as bending and kneeling. Using Aleve prn.         Patient is following up for pain.  Since last visit she states the pain is started to worsen.  She notices that with high heels or increased activity and ambulation the pain can get more severe.  Every 6 months when it is time for repeat injection the pain starts to return.  She rates it to be 3 out of 10 today.  She notices mild swelling.  She takes Aleve as needed.  She is doing her Hydro exercises.  She denies any further radiating symptoms in the leg.  The pain in the popliteal fossa has resolved.  Pain is more anterior medial and lateral.    PHYSICAL EXAM:   Resp 18   Ht 68\"   Wt 182 lb (82.6 kg)   LMP  (LMP Unknown)   BMI 27.67 kg/m²     KNEE:  Inspection: no erythema, mild swelling, no obvious deformity  Palpation: Tenderness to palpation of the medial tibial plateau  ROM: Full active ROM in flexion and extension but restricted with end range of motion due to swelling  Strength: 5/5  Sensation: Intact to light touch in all dermatomes of the lower extremities    IMAGING:     MRI right knee completed on 8/25/2023 was reviewed which is notable for severe tricompartmental osteoarthritis most severe in the medial compartment and complex degenerative tear of the medial meniscus with chronic stress changes in the medial compartment. There is no acute insufficiency fracture. There is Baker's cyst as well.     All imaging results were reviewed and discussed with patient.      ASSESSMENT/PLAN:     1. Primary osteoarthritis of right knee        Mirta Miller is a 63 year old female  following up for right knee pain secondary to osteoarthritis.  I recommended and performed right knee aspiration injection with gel 1.  Advised her to follow-up in 3 months.  Recommend she continue home exercises and topical treatment as needed.  Advised her to take Tylenol instead of NSAID if possible.      The patient verbalized understanding with the plan and was in agreement. All questions/concerns were addressed and there were no barriers to learning.  Please note Dragon dictation software was used to dictate this note and may result in inadvertent typos.    Marcelina Martinez DO, FAAPMR & CAQSM  Physical Medicine and Rehabilitation  Sports and Spine Medicine    PAST MEDICAL HISTORY:     Past Medical History:    Actinic keratosis    Allergic rhinitis    Anesthesia complication    blood pressure bottomed out - 6-8 hrs after surgery. Revived on own when staff moved pt.    Anxiety    Anxiety state    Cancer (HCC)    Breast Cancer DX     Chronic rhinitis    Cough variant asthma (HCC)    Depression    Dyslipidemia    Environmental allergies    Esophageal reflux    History of neuroma    \"Neuroma 3 rt\"    HTN (hypertension), benign    Melanoma in situ (HCC)    Left chest    Metatarsalgia    \"modified insole / rx naprosyn\"    LAURY on CPAP    Osteoarthritis    PONV (postoperative nausea and vomiting)    Right knee injury    Arthroscopy    Sleep apnea    Visual impairment    glasses/contacts,dry eye         PAST SURGICAL HISTORY:     Past Surgical History:   Procedure Laterality Date          x 4    Colonoscopy  2013    per NG    Colonoscopy      2013    Hysteroscopy  2017    myosure polypectomy    Knee arthroscopy Right     x3 last     [facility] localization wire 1 site left (cpt=19281)      Maximus localization wire 1 site right (cpt=19281)      Other surgical history       arthroscopic surgery 3 times right knee meniscal tear    Radiation Bilateral 04/15/2019    recent radiation tx for breast cancer     Reduction left  2014    Reduction of large breast Bilateral     Reduction right  2014         CURRENT MEDICATIONS:     Current Outpatient Medications   Medication Sig Dispense Refill    ALPRAZolam 0.5 MG Oral Tab Take 1 tablet (0.5 mg total) by mouth 2 (two) times daily as needed for Anxiety. 60 tablet 0    losartan 50 MG Oral Tab Take 1 tablet (50 mg total) by mouth daily. 90 tablet 3    Phentermine HCl 15 MG Oral Cap Take 1 capsule (15 mg total) by mouth every morning. 30 capsule 5    rosuvastatin 10 MG Oral Tab rosuvastatin 10 mg tablet, [RxNorm: 608414]      fluorouracil 5 % External Cream Use once weekly at night as directed x 8weeks 40 g 0    pantoprazole 40 MG Oral Tab EC Take 1 tablet (40 mg total) by mouth daily.      metoprolol succinate  MG Oral Tablet 24 Hr Take 1 tablet (100 mg total) by mouth daily.      Efinaconazole (JUBLIA) 10 % External Solution Apply 1 Application. topically 3 (three) times a week. 8 mL 3    Azelastine HCl 137 MCG/SPRAY Nasal Solution 2 sprays by Nasal route 2 (two) times daily. 30 mL 5    anastrozole 1 MG Oral Tab tab Take 1 tablet (1 mg total) by mouth daily.      Probiotic Product (ALIGN OR) Take by mouth.           ALLERGIES:     Allergies   Allergen Reactions    Banana HIVES    Cantaloupe HIVES    Cephalosporins HIVES    Sulfamethoxazole HIVES    Trimethoprim HIVES    Milk-Related Compounds PAIN     Stomach pain, diarrhea    Dust Mite Extract ITCHING    Penicillins HIVES and RASH         FAMILY HISTORY:     Family History   Adopted: Yes   Family history unknown: Yes          SOCIAL HISTORY:     Social History     Socioeconomic History    Marital status:    Tobacco Use    Smoking status: Former     Current packs/day: 0.00     Types: Cigarettes     Quit date: 1986     Years since quittin.9     Passive exposure: Past    Smokeless tobacco: Never    Tobacco comments:     I have not smoked in over 35 years   Vaping Use    Vaping status: Never Used    Substance and Sexual Activity    Alcohol use: Yes     Alcohol/week: 2.0 standard drinks of alcohol     Types: 2 Cans of beer per week     Comment: minimal consumption    Drug use: No   Other Topics Concern    Caffeine Concern Yes     Comment: Coffee, 1 cups daily; Tea;     Exercise Yes     Comment: walking, yoga    Grew up on a farm No    History of tanning No    Outdoor occupation No    Pt has a pacemaker No    Pt has a defibrillator No    Breast feeding No    Reaction to local anesthetic No    Left Handed No    Right Handed Yes    Currently spends a great deal of time in the sun No    Hx of Spending Great Deal of Time in Sun No    Bad sunburns in the past No    Tanning Salons in the Past No    Hx of Radiation Treatments Yes   Social History Narrative    The patient does not use an assistive device..      The patient does live in a home with stairs.     Social Determinants of Health      [facility]    Housing Stability          REVIEW OF SYSTEMS:   A comprehensive 10 point review of systems was completed.  Pertinent positives and negatives noted in the the HPI.      LABS:   No results found for: \"EAG\", \"A1C\"  Lab Results   Component Value Date    WBC 4.6 09/28/2022    RBC 4.70 09/28/2022    HGB 14.5 09/28/2022    HCT 44.5 09/28/2022    MCV 94.7 09/28/2022    MCH 30.9 09/28/2022    MCHC 32.6 09/28/2022    RDW 12.0 09/28/2022    .0 09/28/2022    MPV 9.4 09/22/2018     Lab Results   Component Value Date    GLU 82 01/05/2024    BUN 15 01/05/2024    BUNCREA 14.3 01/05/2024    CREATSERUM 1.05 (H) 01/05/2024    ANIONGAP 8 01/05/2024    GFRNAA 71 10/06/2021    GFRAA 81 10/06/2021    CA 9.7 01/05/2024    OSMOCALC 290 01/05/2024    ALKPHO 70 01/05/2024    AST 25 01/05/2024    ALT 23 01/05/2024    ALKPHOS 46 06/13/2016    BILT 0.8 01/05/2024    TP 7.5 01/05/2024    ALB 4.6 01/05/2024    GLOBULIN 2.9 01/05/2024    AGRATIO 1.5 06/13/2016      01/05/2024    K 4.2 01/05/2024     01/05/2024    CO2 27.0 01/05/2024     No results found for: \"PTP\", \"PT\", \"INR\"  Lab Results   Component Value Date    VITD 72.2 09/28/2022    CWYS86VH 25.7 12/20/2014

## 2024-06-10 NOTE — TELEPHONE ENCOUNTER
Contacted Medicaid Eligibility automated line.  Coverage is active however patient has Medicare too and Medicare should be billed first - coverage renewal due date is 8/1/24    Per CMS Guidelines-Authorization is not required based on medical necessity however may be subject to review once claim is submitted.    Right knee aspiration and injection with Gel One  CPT 50997,     Status:Approved-authorization is not required per health plan based on medical necessity however may be subject to review once claim is submitted ([employer])

## 2024-06-13 NOTE — TELEPHONE ENCOUNTER
Spoke to patient and she is informing that she is only enrolled in Medicaid as she is not old enough to obtain coverage under Medicare part B until 8/1/2024. Patient will call in August to provide Medicare Part B once is active.

## 2024-06-21 RX ORDER — EFINACONAZOLE 100 MG/ML
1 SOLUTION TOPICAL
Qty: 8 ML | Refills: 0 | OUTPATIENT
Start: 2024-06-21

## 2024-06-25 NOTE — TELEPHONE ENCOUNTER
Wolfgang Refills request    Last Rx: 1/27/23, 8ml, 3 refills  LOV: 1/30/23    Please sign if you approve or should patient make follow up up to assess

## 2024-06-27 ENCOUNTER — EKG ENCOUNTER (OUTPATIENT)
Dept: LAB | Age: 64
End: 2024-06-27
Attending: INTERNAL MEDICINE
Payer: MEDICAID

## 2024-06-27 ENCOUNTER — LAB ENCOUNTER (OUTPATIENT)
Dept: LAB | Age: 64
End: 2024-06-27
Attending: INTERNAL MEDICINE
Payer: MEDICAID

## 2024-06-27 DIAGNOSIS — E66.3 OVERWEIGHT (BMI 25.0-29.9): ICD-10-CM

## 2024-06-27 DIAGNOSIS — I42.8 OTHER CARDIOMYOPATHY (HCC): ICD-10-CM

## 2024-06-27 DIAGNOSIS — I10 HTN (HYPERTENSION), BENIGN: Primary | ICD-10-CM

## 2024-06-27 DIAGNOSIS — E78.00 HYPERCHOLESTEROLEMIA: ICD-10-CM

## 2024-06-27 DIAGNOSIS — G47.33 OSA ON CPAP: ICD-10-CM

## 2024-06-27 DIAGNOSIS — E78.5 DYSLIPIDEMIA: ICD-10-CM

## 2024-06-27 DIAGNOSIS — I10 HTN (HYPERTENSION), BENIGN: ICD-10-CM

## 2024-06-27 LAB
ALBUMIN SERPL-MCNC: 4.7 G/DL (ref 3.2–4.8)
ALBUMIN/GLOB SERPL: 2 {RATIO} (ref 1–2)
ALP LIVER SERPL-CCNC: 80 U/L
ALT SERPL-CCNC: 28 U/L
ANION GAP SERPL CALC-SCNC: 6 MMOL/L (ref 0–18)
AST SERPL-CCNC: 33 U/L (ref ?–34)
ATRIAL RATE: 68 BPM
BILIRUB SERPL-MCNC: 0.6 MG/DL (ref 0.2–1.1)
BUN BLD-MCNC: 12 MG/DL (ref 9–23)
BUN/CREAT SERPL: 11.3 (ref 10–20)
CALCIUM BLD-MCNC: 9.6 MG/DL (ref 8.7–10.4)
CHLORIDE SERPL-SCNC: 107 MMOL/L (ref 98–112)
CHOLEST SERPL-MCNC: 206 MG/DL (ref ?–200)
CO2 SERPL-SCNC: 30 MMOL/L (ref 21–32)
CREAT BLD-MCNC: 1.06 MG/DL
EGFRCR SERPLBLD CKD-EPI 2021: 59 ML/MIN/1.73M2 (ref 60–?)
FASTING PATIENT LIPID ANSWER: YES
FASTING STATUS PATIENT QL REPORTED: YES
GLOBULIN PLAS-MCNC: 2.4 G/DL (ref 2–3.5)
GLUCOSE BLD-MCNC: 84 MG/DL (ref 70–99)
HDLC SERPL-MCNC: 64 MG/DL (ref 40–59)
LDLC SERPL CALC-MCNC: 120 MG/DL (ref ?–100)
NONHDLC SERPL-MCNC: 142 MG/DL (ref ?–130)
OSMOLALITY SERPL CALC.SUM OF ELEC: 295 MOSM/KG (ref 275–295)
P AXIS: 46 DEGREES
P-R INTERVAL: 152 MS
POTASSIUM SERPL-SCNC: 4.5 MMOL/L (ref 3.5–5.1)
PROT SERPL-MCNC: 7.1 G/DL (ref 5.7–8.2)
Q-T INTERVAL: 384 MS
QRS DURATION: 84 MS
QTC CALCULATION (BEZET): 408 MS
R AXIS: 28 DEGREES
SODIUM SERPL-SCNC: 143 MMOL/L (ref 136–145)
T AXIS: 26 DEGREES
TRIGL SERPL-MCNC: 128 MG/DL (ref 30–149)
VENTRICULAR RATE: 68 BPM
VLDLC SERPL CALC-MCNC: 22 MG/DL (ref 0–30)

## 2024-06-27 PROCEDURE — 93010 ELECTROCARDIOGRAM REPORT: CPT | Performed by: INTERNAL MEDICINE

## 2024-06-27 PROCEDURE — 36415 COLL VENOUS BLD VENIPUNCTURE: CPT

## 2024-06-27 PROCEDURE — 80061 LIPID PANEL: CPT

## 2024-06-27 PROCEDURE — 80053 COMPREHEN METABOLIC PANEL: CPT

## 2024-06-27 PROCEDURE — 93005 ELECTROCARDIOGRAM TRACING: CPT

## 2024-07-02 RX ORDER — EFINACONAZOLE 100 MG/ML
1 SOLUTION TOPICAL
Qty: 8 ML | Refills: 0 | Status: SHIPPED | OUTPATIENT
Start: 2024-07-03

## 2024-07-10 DIAGNOSIS — E66.3 OVERWEIGHT (BMI 25.0-29.9): Primary | ICD-10-CM

## 2024-07-10 RX ORDER — DIETHYLPROPION HYDROCHLORIDE 25 MG/1
1 TABLET ORAL DAILY
Qty: 30 TABLET | Refills: 5 | Status: SHIPPED | OUTPATIENT
Start: 2024-07-10 | End: 2024-08-09

## 2024-08-01 DIAGNOSIS — F41.1 GAD (GENERALIZED ANXIETY DISORDER): ICD-10-CM

## 2024-08-05 NOTE — TELEPHONE ENCOUNTER
Please review. Protocol Failed; No Protocol      Recent fills: 3/27/2024, 6/3/2024  Last Rx written: 6/3/2024  Last office visit: 9/29/2023    Recent Visits  Date Type Provider Dept   02/05/24 Office Visit Anshu Morrow MD [facility]       Requested Prescriptions   Pending Prescriptions Disp Refills    ALPRAZOLAM 0.5 MG Oral Tab [Pharmacy Med Name: Alprazolam 0.5 Mg Tab Acta] 60 tablet 0     Sig: Take 1 tablet (0.5 mg total) by mouth 2 (two) times daily as needed for Anxiety.       Controlled Substance Medication Failed - 8/1/2024  7:47 PM        Failed - This medication is a controlled substance - forward to provider to refill               Future Appointments         Provider Department Appt Notes    In 4 days Floresita Plascencia MD [facility] 3 mon f/u    In 2 weeks Massimo Biswas MD [facility]     In 1 month Marcelina Martinez,  [facility] 3 mo f/u    In 5 months Sonido Gonzalez MD [facility] MED CHECK, SLEEP APNEA, ASTHMA-          Recent Outpatient Visits              1 month ago Primary osteoarthritis of right knee    [facility] Marcelina Martinez DO    Office Visit    2 months ago LAURY on CPAP    [facility] Sonido Gonzalez MD    Office Visit    3 months ago AK (actinic keratosis)    [facility] Floresita Plascencia MD    Office Visit    3 months ago HTN (hypertension), benign    [facility] Massimo Biswas MD    Office Visit    6 months ago Cough variant asthma (HCC)    [facility] [place] Anshu Morrow MD    Office Visit

## 2024-08-06 RX ORDER — ALPRAZOLAM 0.5 MG
0.5 TABLET ORAL 2 TIMES DAILY PRN
Qty: 60 TABLET | Refills: 0 | Status: SHIPPED | OUTPATIENT
Start: 2024-08-06 | End: 2024-10-18

## 2024-08-09 ENCOUNTER — OFFICE VISIT (OUTPATIENT)
Dept: DERMATOLOGY CLINIC | Facility: CLINIC | Age: 64
End: 2024-08-09
Payer: MEDICARE

## 2024-08-09 DIAGNOSIS — D23.9 BENIGN NEOPLASM OF SKIN, UNSPECIFIED LOCATION: ICD-10-CM

## 2024-08-09 DIAGNOSIS — Z51.81 MEDICATION MONITORING ENCOUNTER: ICD-10-CM

## 2024-08-09 DIAGNOSIS — Z13.89 ENCOUNTER FOR SURVEILLANCE OF ABNORMAL NEVI: ICD-10-CM

## 2024-08-09 DIAGNOSIS — Z08 ENCOUNTER FOR FOLLOW-UP SURVEILLANCE OF MELANOMA: ICD-10-CM

## 2024-08-09 DIAGNOSIS — Z85.820 ENCOUNTER FOR FOLLOW-UP SURVEILLANCE OF MELANOMA: ICD-10-CM

## 2024-08-09 DIAGNOSIS — L57.0 AK (ACTINIC KERATOSIS): Primary | ICD-10-CM

## 2024-08-09 DIAGNOSIS — L82.1 SEBORRHEIC KERATOSES: ICD-10-CM

## 2024-08-09 DIAGNOSIS — D22.9 MULTIPLE NEVI: ICD-10-CM

## 2024-08-09 DIAGNOSIS — L81.4 LENTIGO: ICD-10-CM

## 2024-08-09 PROCEDURE — G2211 COMPLEX E/M VISIT ADD ON: HCPCS | Performed by: DERMATOLOGY

## 2024-08-09 PROCEDURE — 99213 OFFICE O/P EST LOW 20 MIN: CPT | Performed by: DERMATOLOGY

## 2024-08-19 NOTE — PROGRESS NOTES
Mirta Miller is a 63 year old female.  HPI:     CC:    Chief Complaint   Patient presents with    Full Skin Exam     LOV 24- Pt presents for a Full Body Skin Exam and a follow up to [facility]. Patient denies any new lesions or areas of concern.   Hx of Malignant Melanoma of the chest ()          Allergies:  Banana, Cantaloupe, Cephalosporins, Sulfamethoxazole, Trimethoprim, Milk-related compounds, Dust mite extract, and Penicillins    HISTORY:    Past Medical History:    Actinic keratosis    Allergic rhinitis    Anesthesia complication    blood pressure bottomed out - 6-8 hrs after surgery. Revived on own when staff moved pt.    Anxiety    Anxiety state    Cancer (HCC)    Breast Cancer DX     Chronic rhinitis    Cough variant asthma (HCC)    Depression    Dyslipidemia    Environmental allergies    Esophageal reflux    History of neuroma    \"Neuroma 3 rt\"    HTN (hypertension), benign    Melanoma in situ (HCC)    Left chest    Metatarsalgia    \"modified insole / rx naprosyn\"    LAURY on CPAP    Osteoarthritis    PONV (postoperative nausea and vomiting)    Right knee injury    Arthroscopy    Sleep apnea    Visual impairment    glasses/contacts,dry eye      Past Surgical History:   Procedure Laterality Date          x 4    Colonoscopy  2013    per NG    Colonoscopy      2013    Hysteroscopy  2017    myosure polypectomy    Knee arthroscopy Right     x3 last     [facility] localization wire 1 site left (cpt=19281)      [facility] localization wire 1 site right (cpt=19281)      Other surgical history       arthroscopic surgery 3 times right knee meniscal tear    Radiation Bilateral 04/15/2019    recent radiation tx for breast cancer    Reduction left  2014    Reduction of large breast Bilateral 2014    Reduction right  2014      Family History   Adopted: Yes   Family history unknown: Yes      Social History     Socioeconomic History    Marital status:    Tobacco Use    Smoking  status: Former     Current packs/day: 0.00     Types: Cigarettes     Quit date: 1986     Years since quittin.1     Passive exposure: Past    Smokeless tobacco: Never    Tobacco comments:     I have not smoked in over 35 years   Vaping Use    Vaping status: Never Used   Substance and Sexual Activity    Alcohol use: Yes     Alcohol/week: 2.0 standard drinks of alcohol     Types: 2 Cans of beer per week     Comment: minimal consumption    Drug use: No   Other Topics Concern    Caffeine Concern Yes     Comment: Coffee, 1 cups daily; Tea;     Exercise Yes     Comment: walking, yoga    Grew up on a farm No    History of tanning No    Outdoor occupation No    Pt has a pacemaker No    Pt has a defibrillator No    Breast feeding No    Reaction to local anesthetic No    Left Handed No    Right Handed Yes    Currently spends a great deal of time in the sun No    Hx of Spending Great Deal of Time in Sun No    Bad sunburns in the past No    Tanning Salons in the Past No    Hx of Radiation Treatments Yes   Social History Narrative    The patient does not use an assistive device..      The patient does live in a home with stairs.     Social Determinants of Health      [facility]    Housing Stability        Current Outpatient Medications   Medication Sig Dispense Refill    ALPRAZolam 0.5 MG Oral Tab Take 1 tablet (0.5 mg total) by mouth 2 (two) times daily as needed for Anxiety. 60 tablet 0    losartan 50 MG Oral Tab Take 1 tablet (50 mg total) by mouth daily. 90 tablet 3    rosuvastatin 10 MG Oral Tab rosuvastatin 10 mg tablet, [RxNorm: 727427]      pantoprazole 40 MG Oral Tab EC Take 1 tablet (40 mg total) by mouth daily.      metoprolol succinate  MG Oral Tablet 24 Hr Take 1 tablet (100 mg total) by mouth daily.      Azelastine HCl 137 MCG/SPRAY Nasal Solution 2 sprays by Nasal route 2 (two) times daily. 30 mL 5    anastrozole 1 MG Oral Tab tab  Take 1 tablet (1 mg total) by mouth daily.      Probiotic Product (ALIGN OR) Take by mouth.      JUBLIA 10 % External Solution Apply 1 Application. topically 3 (three) times a week. (Patient not taking: Reported on 2024) 8 mL 0    fluorouracil 5 % External Cream Use once weekly at night as directed x 8weeks (Patient not taking: Reported on 2024) 40 g 0     Allergies:   Allergies   Allergen Reactions    Banana HIVES    Cantaloupe HIVES    Cephalosporins HIVES    Sulfamethoxazole HIVES    Trimethoprim HIVES    Milk-Related Compounds PAIN     Stomach pain, diarrhea    Dust Mite Extract ITCHING    Penicillins HIVES and RASH       Past Medical History:    Actinic keratosis    Allergic rhinitis    Anesthesia complication    blood pressure bottomed out - 6-8 hrs after surgery. Revived on own when staff moved pt.    Anxiety    Anxiety state    Cancer (HCC)    Breast Cancer DX     Chronic rhinitis    Cough variant asthma (HCC)    Depression    Dyslipidemia    Environmental allergies    Esophageal reflux    History of neuroma    \"Neuroma 3 rt\"    HTN (hypertension), benign    Melanoma in situ (HCC)    Left chest    Metatarsalgia    \"modified insole / rx naprosyn\"    LAURY on CPAP    Osteoarthritis    PONV (postoperative nausea and vomiting)    Right knee injury    Arthroscopy    Sleep apnea    Visual impairment    glasses/contacts,dry eye     Past Surgical History:   Procedure Laterality Date          x 4    Colonoscopy  2013    per NG    Colonoscopy      2013    Hysteroscopy  2017    myosure polypectomy    Knee arthroscopy Right     x3 last     [facility] localization wire 1 site left (cpt=19281)      [facility] localization wire 1 site right (cpt=19281)      Other surgical history       arthroscopic surgery 3 times right knee meniscal tear    Radiation Bilateral 04/15/2019    recent radiation tx for breast cancer    Reduction left  2014    Reduction of large breast Bilateral 2014    Reduction right   2014     Social History     Socioeconomic History    Marital status:      Spouse name: Not on file    Number of children: Not on file    Years of education: Not on file    Highest education level: Not on file   Occupational History    Not on file   Tobacco Use    Smoking status: Former     Current packs/day: 0.00     Types: Cigarettes     Quit date: 1986     Years since quittin.1     Passive exposure: Past    Smokeless tobacco: Never    Tobacco comments:     I have not smoked in over 35 years   Vaping Use    Vaping status: Never Used   Substance and Sexual Activity    Alcohol use: Yes     Alcohol/week: 2.0 standard drinks of alcohol     Types: 2 Cans of beer per week     Comment: minimal consumption    Drug use: No    Sexual activity: Not on file   Other Topics Concern     Service Not Asked    Blood Transfusions Not Asked    Caffeine Concern Yes     Comment: Coffee, 1 cups daily; Tea;     Occupational Exposure Not Asked    Hobby Hazards Not Asked    Sleep Concern Not Asked    Stress Concern Not Asked    Weight Concern Not Asked    Special Diet Not Asked    Back Care Not Asked    Exercise Yes     Comment: walking, yoga    Bike Helmet Not Asked    Seat Belt Not Asked    Self-Exams Not Asked    Grew up on a farm No    History of tanning No    Outdoor occupation No    Pt has a pacemaker No    Pt has a defibrillator No    Breast feeding No    Reaction to local anesthetic No    Left Handed No    Right Handed Yes    Currently spends a great deal of time in the sun No    Past Sunlamp Treatments for Acne Not Asked    Hx of Spending Great Deal of Time in Sun No    Bad sunburns in the past No    Tanning Salons in the Past No    Hx of Radiation Treatments Yes    Regular use of sun block Not Asked   Social History Narrative    The patient does not use an assistive device..      The patient does live in a home with stairs.     Social Determinants of Health     Financial Resource Strain: Not on file    Food Insecurity: Not on file   Transportation Needs: Not on file   Physical Activity: Not on file   Stress: Not on file   Social Connections: Not on file   Housing Stability: Low Risk  (1/26/2023)    [facility]    Housing Stability     Mortgage Payment Concerns?: Not on file     Number of Places Lived in the Last Year: Not on file     Unstable Housing?: Not on file     Family History   Adopted: Yes   Family history unknown: Yes       There were no vitals filed for this visit.    HPI:    Chief Complaint   Patient presents with    Full Skin Exam     LOV 05/01/24- Pt presents for a Full Body Skin Exam and a follow up to [facility]. Patient denies any new lesions or areas of concern.   Hx of Malignant Melanoma of the chest (06/22)      Follow-up AK's post fluorouracil has noted improvement.  Had been using this only and central upper lip area tolerated no side effects irritation subsided quickly  New area came up at left upper lip.  Slightly red still using fluorouracil twice weekly.  Had been careful while away in [place] with sunscreen hat.      Patient adopted unknown family history  Patient here for follow-up history melanoma in situ at left chest post wide excision  Concern with lesion at lateral orbit  No prior Personal history of skin cancer or atypical lesions removed.  Lots of sun exposure in the past.  Using Vichy product helping.  Overall doing well careful sun protection.    Notes lesion left ear left shoulder    Concerns as above    Patient presents with concerns above.    Patient has been in their usual state of health.  History, medications, allergies reviewed as noted.      ROS:  Denies any other systemic complaints.  No new or changeing lesions other than noted above. No fevers, chills, night sweats, unusual sun sensitivity.  No other skin complaints.        History, medications, allergies reviewed as noted.       Physical Examination:      Well-developed well-nourished patient alert oriented in no acute distress.  Exam total-body performed, including scalp, head, neck, face,nails, hair, external eyes, including conjunctival mucosa, eyelids, lips external ears, back, chest,/ breasts, axillae,  abdomen, arms, legs, palms.     Multiple light to medium brown, well marginated, uniformly pigmented, macules and papules 6 mm and less scattered on exam. pigmented lesions examined with dermoscopy benign-appearing patterns.     Waxy tannish keratotic papules scattered, cherry-red vascular papules scattered.    See map today's date for lesions noted .      Otherwise remarkable for lesions as noted on map.  See details of examination  See Assessment /Plan for additional history and physical exam also:    Assessment / plan:    No orders of the defined types were placed in this encounter.      Meds & Refills for this Visit:  Requested Prescriptions      No prescriptions requested or ordered in this encounter         Encounter Diagnoses   Name Primary?    AK (actinic keratosis) Yes    Seborrheic keratoses     Multiple nevi     Lentigo     Medication monitoring encounter     Encounter for follow-up surveillance of melanoma     Encounter for surveillance of abnormal nevi     Benign neoplasm of skin, unspecified location        See details on map.      Remarkable for:      Patient seen for follow-up long-term monitoring, treatment of  Actinic keratoses, abnormal nevi, sun damage, history of melanoma, history of breast cancer  Requiring ongoing surveillance, monitoring.  See previous notes.  Patient requiring frequent monitoring, long-term surveillance for above diagnoses, office visits every 3 to 4 months, self monitoring and follow-up as needed in the interim.  Plan is continued surveillance as noted and ongoing education  There is a longitudinal care relationship with me, the care plan reflects the ongoing nature of the continuous relationship of care, and the medical  record indicates that there is ongoing treatment of a serious/complex medical condition which I am currently managing.  is Applicable      No active AK's currently history of fluorouracil use.  Has noted significant improvement actinic Keratoses.  Precancerous nature discussed. Sun protection, sunscreen/ blocks encouraged .  Monitoring for new lesions.  Sun damage additional recurrent and new actinic keratoses, skin cancers may occur in areas of prior actinic keratoses, related to past sun exposure to minimize current sun exposure.  Sunscreen applied consistently regularly, reapplication and sun protection while driving recommended.      No new suspicious lesions otherwise exam unchanged    otherwise no new atypical appearing lesions  -pt with history of melanoma, at high risk for nonmelanoma skin cancers, history of breast cancer active actinic keratoses.  Extensive sun damage, numerous pigmented lesions    Lesion left ear, shoulder benign keratoses reassurance.  No atypical features   Exam otherwise unchanged  right mid back, shave biopsy:  -Compound lentiginous nevus10/22 no recurrence     left chest melanoma in situ6/22  Patient post wide excision melanoma in situ healed well.  Continue careful sun protection regular follow-up    Lesion of concern right lateral orbit, temple benign lentigo continue monitoring appears unchanged  Lesion at right lateral orbit tan patch consistent with lentigo recommend observation.  Lumbar back lesions benign keratoses more lesions however noted previously patient unchanged.  Continue monitoring    Remaining pigmented lesions without evidence of atypical changes no significant changes in exam.         plan follow-up for full skin exam in 3 to 4 months    Waxy tan keratotic papules lesions in areas of concern as noted reassurance given.  Benign nature discussed.  Possibility of cryo, alphahydroxy acids over-the-counter retinol's discussed.    Lentigo left lateral thigh  observe.    Moderate sun damage with extensive lentigines keratoses.  Given history of breast cancer, sun exposure at least annual follow-up in future.  Patient adopted family history unknown careful sun protection, further plans pending pathology  Patient at high risk for both melanoma and nonmelanoma skin cancers new lesions.  Continue careful monitoring every 4 months.    History of verrucoid keratosis right forearm no recurrence    Waxy tan keratotic papule left temple reassurance regarding benign seborrheic keratosis reassurance given    Scattered keratoses back chest neck shoulders no suspicious lesions multiple lentigines reassurance continue sun protection    Few milia.  Most prominent tong-nasally, cheeks and forehead on examination multiple whitish dome-shaped smooth papules are noted.  Milia diagnosis , pathophysiology discussed.  Over-the-counter retinol products may cause peeling irritation.  Differin gel over-the-counter consider prescription Retin-A if worsening.  No new suspicious lesions    Multiple nevi no significant changes.  Continue regular skin checks.  Follow-up 1 year or as needed    Please refer to map for specific lesions.  See additional diagnoses.  Pros cons of various therapies, risks benefits discussed.Pathophysiology discussed with patient.  Therapeutic options reviewed.  See  Medications in grid.  Instructions reviewed at length.    Benign nevi, seborrheic  keratoses, cherry angiomas:  Reassurance regarding other benign skin lesions.Signs and symptoms of skin cancer, ABCDE's of melanoma discussed with patient. Sunscreen use, sun protection, self exams reviewed.  Followup as noted RTC routine checkup 6 mos - one year or p.r.n.  Encounter Times   Including precharting, reviewing chart, prior notes obtaining history: 10 minutes, medical exam :10 minutes, notes on body map, plan, counseling 10minutes My total time spent caring for the patient on the day of the encounter: 30 minutes      The patient indicates understanding of these issues and agrees to the plan.  The patient is asked to return as noted in follow-up/ above.    This note was generated using Dragon voice recognition software.  Please contact me regarding any confusion resulting from errors in recognition.

## 2024-08-22 ENCOUNTER — OFFICE VISIT (OUTPATIENT)
Dept: SURGERY | Facility: CLINIC | Age: 64
End: 2024-08-22
Payer: MEDICARE

## 2024-08-22 VITALS
OXYGEN SATURATION: 98 % | HEIGHT: 68 IN | BODY MASS INDEX: 28.19 KG/M2 | WEIGHT: 186 LBS | SYSTOLIC BLOOD PRESSURE: 122 MMHG | DIASTOLIC BLOOD PRESSURE: 80 MMHG | HEART RATE: 75 BPM

## 2024-08-22 DIAGNOSIS — E78.5 DYSLIPIDEMIA: ICD-10-CM

## 2024-08-22 DIAGNOSIS — I10 HTN (HYPERTENSION), BENIGN: Primary | ICD-10-CM

## 2024-08-22 DIAGNOSIS — Z51.81 ENCOUNTER FOR THERAPEUTIC DRUG MONITORING: ICD-10-CM

## 2024-08-22 DIAGNOSIS — G47.33 OSA ON CPAP: ICD-10-CM

## 2024-08-22 DIAGNOSIS — E66.3 OVERWEIGHT (BMI 25.0-29.9): ICD-10-CM

## 2024-08-22 PROCEDURE — 99214 OFFICE O/P EST MOD 30 MIN: CPT | Performed by: INTERNAL MEDICINE

## 2024-08-22 RX ORDER — PHENTERMINE HYDROCHLORIDE 8 MG/1
1 TABLET ORAL DAILY
Qty: 30 TABLET | Refills: 2 | Status: SHIPPED | OUTPATIENT
Start: 2024-08-22 | End: 2024-09-21

## 2024-08-22 NOTE — PROGRESS NOTES
[facility], [facility], [place]  [address] [facility] [address] [place] [id number]  Dept: [phone]       Patient:  Mirta Miller  :      1960  MRN:      [id number]    Chief Complaint:    Chief Complaint   Patient presents with    Follow - Up    Weight Management       SUBJECTIVE     History of Present Illness:  Mirta Cueto is being seen today for a follow-up for non surgical weight loss.     Past Medical History:   Past Medical History:    Actinic keratosis    Allergic rhinitis    Anesthesia complication    blood pressure bottomed out - 6-8 hrs after surgery. Revived on own when staff moved pt.    Anxiety    Anxiety state    Cancer (HCC)    Breast Cancer DX     Chronic rhinitis    Cough variant asthma (HCC)    Depression    Dyslipidemia    Environmental allergies    Esophageal reflux    History of neuroma    \"Neuroma 3 rt\"    HTN (hypertension), benign    Melanoma in situ (HCC)    Left chest    Metatarsalgia    \"modified insole / rx naprosyn\"    LAURY on CPAP    Osteoarthritis    PONV (postoperative nausea and vomiting)    Right knee injury    Arthroscopy    Sleep apnea    Visual impairment    glasses/contacts,dry eye        Comorbidities:  Back pain-Improvement?  yes, Joint pain-Improvement?  yes, KIMBERLEY-Improvement?  yes, and Snoring-Improvement?  yes    OBJECTIVE     Vitals: /80   Pulse 75   Ht 5' 8\" (1.727 m)   Wt 186 lb (84.4 kg)   LMP  (LMP Unknown)   SpO2 98%   BMI 28.28 kg/m²     Initial weight loss: +1   Total weight loss: -02   Start weight: 188    Wt Readings from Last 3 Encounters:   24 186 lb (84.4 kg)   06/10/24 182 lb (82.6 kg)   24 184 lb (83.5 kg)       Patient Medications:    Current Outpatient Medications   Medication Sig Dispense Refill    ALPRAZolam 0.5 MG Oral Tab Take 1 tablet (0.5 mg total) by mouth 2 (two) times daily as needed for Anxiety. 60 tablet 0    JUBLIA 10 % External Solution Apply 1 Application. topically 3  (three) times a week. (Patient not taking: Reported on 2024) 8 mL 0    losartan 50 MG Oral Tab Take 1 tablet (50 mg total) by mouth daily. 90 tablet 3    rosuvastatin 10 MG Oral Tab rosuvastatin 10 mg tablet, [RxNorm: 015281]      fluorouracil 5 % External Cream Use once weekly at night as directed x 8weeks (Patient not taking: Reported on 2024) 40 g 0    pantoprazole 40 MG Oral Tab EC Take 1 tablet (40 mg total) by mouth daily.      metoprolol succinate  MG Oral Tablet 24 Hr Take 1 tablet (100 mg total) by mouth daily.      Azelastine HCl 137 MCG/SPRAY Nasal Solution 2 sprays by Nasal route 2 (two) times daily. 30 mL 5    anastrozole 1 MG Oral Tab tab Take 1 tablet (1 mg total) by mouth daily.      Probiotic Product (ALIGN OR) Take by mouth.       Allergies:  Banana, Cantaloupe, Cephalosporins, Sulfamethoxazole, Trimethoprim, Milk-related compounds, Dust mite extract, and Penicillins     Social History:    Social History     Socioeconomic History    Marital status:      Spouse name: Not on file    Number of children: Not on file    Years of education: Not on file    Highest education level: Not on file   Occupational History    Not on file   Tobacco Use    Smoking status: Former     Current packs/day: 0.00     Types: Cigarettes     Quit date: 1986     Years since quittin.1     Passive exposure: Past    Smokeless tobacco: Never    Tobacco comments:     I have not smoked in over 35 years   Vaping Use    Vaping status: Never Used   Substance and Sexual Activity    Alcohol use: Yes     Alcohol/week: 2.0 standard drinks of alcohol     Types: 2 Cans of beer per week     Comment: minimal consumption    Drug use: No    Sexual activity: Not on file   Other Topics Concern     Service Not Asked    Blood Transfusions Not Asked    Caffeine Concern Yes     Comment: Coffee, 1 cups daily; Tea;     Occupational Exposure Not Asked    Hobby Hazards Not Asked    Sleep Concern Not Asked    Stress  Concern Not Asked    Weight Concern Not Asked    Special Diet Not Asked    Back Care Not Asked    Exercise Yes     Comment: walking, yoga    Bike Helmet Not Asked    Seat Belt Not Asked    Self-Exams Not Asked    Grew up on a farm No    History of tanning No    Outdoor occupation No    Pt has a pacemaker No    Pt has a defibrillator No    Breast feeding No    Reaction to local anesthetic No    Left Handed No    Right Handed Yes    Currently spends a great deal of time in the sun No    Past Sunlamp Treatments for Acne Not Asked    Hx of Spending Great Deal of Time in Sun No    Bad sunburns in the past No    Tanning Salons in the Past No    Hx of Radiation Treatments Yes    Regular use of sun block Not Asked   Social History Narrative    The patient does not use an assistive device..      The patient does live in a home with stairs.     Social Determinants of Health     Financial Resource Strain: Not on file   Food Insecurity: Not on file   Transportation Needs: Not on file   Physical Activity: Not on file   Stress: Not on file   Social Connections: Not on file   Housing Stability: Low Risk  (2023)    [facility]    Housing Stability     Mortgage Payment Concerns?: Not on file     Number of Places Lived in the Last Year: Not on file     Unstable Housing?: Not on file     Surgical History:    Past Surgical History:   Procedure Laterality Date          x 4    Colonoscopy  2013    per NG    Colonoscopy      2013    Hysteroscopy  2017    myosure polypectomy    Knee arthroscopy Right     x3 last     Maximus localization wire 1 site left (cpt=19281)      Maximus localization wire 1 site right (cpt=19281)      Other surgical history       arthroscopic surgery 3 times right knee meniscal tear    Radiation Bilateral 04/15/2019    recent radiation tx for breast cancer    Reduction left  2014    Reduction of large breast Bilateral      Reduction right  05/2014     Family History:    Family History   Adopted: Yes   Family history unknown: Yes       Food Journal  Reviewed and Discussed:       Patient has a Food Journal?: yes   Patient is reading nutrition labels?  yes  Average Caloric Intake:     Average CHO Intake: 120  Is patient exercising? yes  Type of exercise? [facility]    Eating Habits  Patient states the following:  Eats 3 meal(s) per day  Length of time it takes to consume a meal:  20  # of snacks per day: 1 Type of snacks:  fruit  Amount of soda consumption per day:    Amount of water (in ounces) per day:  64  Drinking between meals only:  yes  Toughest challenge:      Nutritional Goals  Limit carbohydrates to 100 gms per day, Eat 100-200 calories within 1 hour of waking , and Eat 3-4 cups of fresh fruits or vegetables daily    Behavior Modifications Reviewed and Discussed  Eat breakfast, Eat 3 meals per day, Plan meals in advance, Read nutrition labels, Drink 64 oz of water per day, Maintain a daily food journal, No drinking 30 minutes before or after meals, Utlize portion control strategies to reduce calorie intake, Identify triggers for eating and manage cues, and Eat slowly and take 20 to 30 minutes to complete each meal    Exercise Goals Reviewed and Discussed    Continue [facility]    ROS:    Constitutional: negative  Respiratory: negative  Cardiovascular: negative  Gastrointestinal: negative  Musculoskeletal:negative  Neurological: negative  Behavioral/Psych: negative  Endocrine: negative  All other systems were reviewed and are negative    Physical Exam:   General appearance: alert, appears stated age, cooperative, and mildly obese  Head: Normocephalic, without obvious abnormality, atraumatic  Back: symmetric, no curvature. ROM normal. No CVA tenderness.  Lungs: clear to auscultation bilaterally  Heart: S1, S2 normal, no murmur, click, rub or gallop, regular rate and rhythm  Abdomen: soft, non-tender; bowel sounds normal; no masses,  no  organomegaly  Extremities: extremities normal, atraumatic, no cyanosis or edema  Pulses: 2+ and symmetric  Skin: Skin color, texture, turgor normal. No rashes or lesions  Neurologic: Grossly normal    ASSESSMENT     HYPERTENSION:  The patient's blood pressure has been well controlled.  she has been checking it as instructed and has remained in relatively good control.    HYPERCHOLESTEROLEMIA:  The patient states that her cholesterol has been well controlled on her current medication.    Lab Results   Component Value Date/Time    CHOLEST 206 (H) 06/27/2024 08:43 AM     (H) 06/27/2024 08:43 AM    HDL 64 (H) 06/27/2024 08:43 AM    TRIG 128 06/27/2024 08:43 AM    VLDL 22 06/27/2024 08:43 AM       Encounter Diagnosis(ses):   Encounter Diagnoses   Name Primary?    HTN (hypertension), benign Yes    LAURY on CPAP     Dyslipidemia     Encounter for therapeutic drug monitoring     Overweight (BMI 25.0-29.9)        PLAN     Patient is not interested in bariatric surgery. Patient desires to pursue traditional weight loss at this time.      HYPERTENSION: Blood pressure stable on the above medications. No interval change in antihypertensive medication.     Patient was instructed to continue wearing their CPaP as recommended.     DYSLIPIDEMIA: Stable on the above prescribed meal plan and medication. Liver function stable.    Lab Results   Component Value Date/Time    CHOLEST 206 (H) 06/27/2024 08:43 AM     (H) 06/27/2024 08:43 AM    HDL 64 (H) 06/27/2024 08:43 AM    TRIG 128 06/27/2024 08:43 AM    VLDL 22 06/27/2024 08:43 AM       Goals for next month:  1. Keep a food log.  2. Drink 48-64 ounces of non-caloric beverages per day. No fruit juices or regular soda.  3. Increase activity-upper body exercises, walk 10 minutes per day.  4. Increase fruit and vegetable servings to 5-6 per day.      Does not feel Diethylpropion working as well  Switch to Phentermine     PHENTERMINE:refill  at 15 mg  Should get updated  EKG    Continue to work out at [facility]    Scared of GLP1    Has good motivation    Follow up with oncology team    Diagnoses and all orders for this visit:    HTN (hypertension), benign    LAURY on CPAP    Dyslipidemia    Encounter for therapeutic drug monitoring    Overweight (BMI 25.0-29.9)          Massimo Biswas MD

## 2024-08-26 ENCOUNTER — NURSE TRIAGE (OUTPATIENT)
Dept: INTERNAL MEDICINE CLINIC | Facility: CLINIC | Age: 64
End: 2024-08-26

## 2024-08-26 NOTE — TELEPHONE ENCOUNTER
Action Requested: Summary for Provider     []  Critical Lab, Recommendations Needed  [] Need Additional Advice  [x]   FYI    []   Need Orders  [] Need Medications Sent to Pharmacy  []  Other     SUMMARY:   Spoke with patient, Date of Birth verified  She stated it happened 5-7 days ago, not sure if she pulled something on the left middle of her buttocks.   She did hydrofit in the water last Thursday after the class she experienced the pain, current rate 5-6/10  She stated maybe it's muscular.  Patient takes aleve which it helped.   She denies chest pain, shortness of breath, fever,  no other sx.   Pt was advised if symptom persist or gets worse to go to ER/ IC, she agreed and stated understanding.   Appt made with Leyla ORTIZ for eval & treat.        Reason for call: rectal pain  Onset: 5-7 days         Reason for Disposition   Patient wants to be seen    Protocols used: Rectal Symptoms-A-OH    Future Appointments   Date Time Provider Department Center   8/27/2024 11:00 AM [facility]   9/18/2024 11:45 AM Marcelina Martinez, DO [facility]   9/25/2024  9:00 AM Elliot Watson MD [facility]   1/2/2025 11:00 AM [facility]   2/5/2025 10:30 AM Floresita Plascencia [facility]   2/19/2025  9:45 AM Massimo Biswas MD [address]

## 2024-08-27 ENCOUNTER — OFFICE VISIT (OUTPATIENT)
Dept: INTERNAL MEDICINE CLINIC | Facility: CLINIC | Age: 64
End: 2024-08-27
Payer: MEDICARE

## 2024-08-27 VITALS
WEIGHT: 185 LBS | RESPIRATION RATE: 16 BRPM | HEART RATE: 79 BPM | SYSTOLIC BLOOD PRESSURE: 123 MMHG | DIASTOLIC BLOOD PRESSURE: 77 MMHG | HEIGHT: 68 IN | BODY MASS INDEX: 28.04 KG/M2

## 2024-08-27 DIAGNOSIS — M54.32 SCIATICA OF LEFT SIDE: Primary | ICD-10-CM

## 2024-08-27 DIAGNOSIS — I10 HTN (HYPERTENSION), BENIGN: ICD-10-CM

## 2024-08-27 PROBLEM — J44.89 ASTHMA WITH COPD (CHRONIC OBSTRUCTIVE PULMONARY DISEASE) (HCC): Chronic | Status: ACTIVE | Noted: 2024-08-27

## 2024-08-27 PROBLEM — N18.30 CKD (CHRONIC KIDNEY DISEASE) STAGE 3, GFR 30-59 ML/MIN (HCC): Chronic | Status: ACTIVE | Noted: 2024-08-27

## 2024-08-27 PROCEDURE — 99214 OFFICE O/P EST MOD 30 MIN: CPT | Performed by: NURSE PRACTITIONER

## 2024-08-27 RX ORDER — METHYLPREDNISOLONE 4 MG
TABLET, DOSE PACK ORAL
Qty: 21 TABLET | Refills: 0 | Status: SHIPPED | OUTPATIENT
Start: 2024-08-27

## 2024-08-27 NOTE — PROGRESS NOTES
Mirta Miller is a 64 year old female.  Chief Complaint   Patient presents with    Pain     Left buttock pain started last Thursday after water exercise class     HPI:   She presents with left buttock pain. The pain started Thursday during water aerobics class.     She has been taking aleve for pain with some relief.     The pain is worse when going up the stairs.     She currently rates the pain a 5/10.     Current Outpatient Medications   Medication Sig Dispense Refill    methylPREDNISolone 4 MG Oral Tablet Therapy Pack Take as directed 21 tablet 0    Phentermine HCl (LOMAIRA) 8 MG Oral Tab Take 1 tablet by mouth daily for 30 doses. 30 tablet 2    ALPRAZolam 0.5 MG Oral Tab Take 1 tablet (0.5 mg total) by mouth 2 (two) times daily as needed for Anxiety. 60 tablet 0    losartan 50 MG Oral Tab Take 1 tablet (50 mg total) by mouth daily. 90 tablet 3    rosuvastatin 10 MG Oral Tab rosuvastatin 10 mg tablet, [RxNorm: 256558]      pantoprazole 40 MG Oral Tab EC Take 1 tablet (40 mg total) by mouth daily.      metoprolol succinate  MG Oral Tablet 24 Hr Take 1 tablet (100 mg total) by mouth daily.      Azelastine HCl 137 MCG/SPRAY Nasal Solution 2 sprays by Nasal route 2 (two) times daily. 30 mL 5    anastrozole 1 MG Oral Tab tab Take 1 tablet (1 mg total) by mouth daily.      Probiotic Product (ALIGN OR) Take by mouth.        Past Medical History:    Actinic keratosis    Allergic rhinitis    Anesthesia complication    blood pressure bottomed out - 6-8 hrs after surgery. Revived on own when staff moved pt.    Anxiety    Anxiety state    Cancer (HCC)    Breast Cancer DX 12/18    Chronic rhinitis    Cough variant asthma (HCC)    Depression    Dyslipidemia    Environmental allergies    Esophageal reflux    History of neuroma    \"Neuroma 3 rt\"    HTN (hypertension), benign    Melanoma in situ (HCC)    Left chest    Metatarsalgia    \"modified insole / rx naprosyn\"    LAURY on CPAP    Osteoarthritis    PONV  (postoperative nausea and vomiting)    Right knee injury    Arthroscopy    Sleep apnea    Visual impairment    glasses/contacts,dry eye      Past Surgical History:   Procedure Laterality Date          x 4    Colonoscopy  2013    per NG    Colonoscopy      2013    Hysteroscopy  2017    myosure polypectomy    Knee arthroscopy Right     x3 last 2013    Maximus localization wire 1 site left (cpt=19281)      Maximus localization wire 1 site right (cpt=19281)      Other surgical history       arthroscopic surgery 3 times right knee meniscal tear    Radiation Bilateral 04/15/2019    recent radiation tx for breast cancer    Reduction left  2014    Reduction of large breast Bilateral 2014    Reduction right  2014      Social History:  Social History     Socioeconomic History    Marital status:    Tobacco Use    Smoking status: Former     Current packs/day: 0.00     Types: Cigarettes     Quit date: 1986     Years since quittin.1     Passive exposure: Past    Smokeless tobacco: Never    Tobacco comments:     I have not smoked in over 35 years   Vaping Use    Vaping status: Never Used   Substance and Sexual Activity    Alcohol use: Yes     Alcohol/week: 2.0 standard drinks of alcohol     Types: 2 Cans of beer per week     Comment: minimal consumption    Drug use: No   Other Topics Concern    Caffeine Concern Yes     Comment: Coffee, 1 cups daily; Tea;     Exercise Yes     Comment: walking, yoga    Grew up on a farm No    History of tanning No    Outdoor occupation No    Pt has a pacemaker No    Pt has a defibrillator No    Breast feeding No    Reaction to local anesthetic No    Left Handed No    Right Handed Yes    Currently spends a great deal of time in the sun No    Hx of Spending Great Deal of Time in Sun No    Bad sunburns in the past No    Tanning Salons in the Past No    Hx of Radiation Treatments Yes   Social History Narrative    The patient does not use an assistive device..       The patient does live in a home with stairs.     Social Determinants of Health      [facility]    Housing Stability      Family History   Adopted: Yes   Family history unknown: Yes      Allergies   Allergen Reactions    Banana HIVES    Cantaloupe HIVES    Cephalosporins HIVES    Sulfamethoxazole HIVES    Trimethoprim HIVES    Milk-Related Compounds PAIN     Stomach pain, diarrhea    Dust Mite Extract ITCHING    Penicillins HIVES and RASH        REVIEW OF SYSTEMS:     Review of Systems   Constitutional:  Negative for fever.   HENT: Negative.     Respiratory:  Negative for cough, shortness of breath and wheezing.    Cardiovascular:  Negative for chest pain.   Gastrointestinal:  Negative for abdominal pain.   Genitourinary: Negative.    Musculoskeletal:  Positive for back pain (left buttock). Negative for gait problem.   Skin: Negative.    Neurological: Negative.    Psychiatric/Behavioral: Negative.        Wt Readings from Last 5 Encounters:   08/27/24 185 lb (83.9 kg)   08/22/24 186 lb (84.4 kg)   06/10/24 182 lb (82.6 kg)   05/20/24 184 lb (83.5 kg)   04/22/24 185 lb (83.9 kg)     Body mass index is 28.13 kg/m².      EXAM:   /77   Pulse 79   Resp 16   Ht 5' 8\" (1.727 m)   Wt 185 lb (83.9 kg)   LMP  (LMP Unknown)   BMI 28.13 kg/m²     Physical Exam  Vitals reviewed.   Constitutional:       Appearance: Normal appearance.   HENT:      Head: Normocephalic.   Cardiovascular:      Rate and Rhythm: Normal rate and regular rhythm.      Pulses: Normal pulses.   Pulmonary:      Breath sounds: Normal breath sounds. No wheezing.   Musculoskeletal:         General: No swelling.      Lumbar back: No spasms or tenderness. Decreased range of motion.        Back:    Skin:     General: Skin is warm and dry.   Neurological:      Mental Status: She is alert and oriented to person, place, and time.   Psychiatric:         Mood and Affect: Mood normal.          Behavior: Behavior normal.            ASSESSMENT AND PLAN:   1. Sciatica of left side  - ok to continue stretching as tolerated  - ok to apply Biofreeze to help with pain  - ok to use heat therapy as needed for pain.   - methylPREDNISolone 4 MG Oral Tablet Therapy Pack; Take as directed  Dispense: 21 tablet; Refill: 0    2. HTN (hypertension), benign  - CPM   - BP stable in office     Total time spent with patient and reviewing the chart: 30 minutes       The patient indicates understanding of these issues and agrees to the plan.  Return for if symptoms do not resolve.

## 2024-08-27 NOTE — PATIENT INSTRUCTIONS
Take medrol dose pack as prescribed    Do not take aleve    Ok to use Biofreeze or icy hot as needed for pain     Ok to use heat therapy as needed for pain

## 2024-09-18 ENCOUNTER — OFFICE VISIT (OUTPATIENT)
Dept: PHYSICAL MEDICINE AND REHAB | Facility: CLINIC | Age: 64
End: 2024-09-18
Payer: MEDICARE

## 2024-09-18 ENCOUNTER — TELEPHONE (OUTPATIENT)
Dept: PHYSICAL MEDICINE AND REHAB | Facility: CLINIC | Age: 64
End: 2024-09-18

## 2024-09-18 VITALS — BODY MASS INDEX: 28 KG/M2 | WEIGHT: 185 LBS

## 2024-09-18 DIAGNOSIS — M17.11 PRIMARY OSTEOARTHRITIS OF RIGHT KNEE: Primary | ICD-10-CM

## 2024-09-18 RX ORDER — TRIAMCINOLONE ACETONIDE 40 MG/ML
40 INJECTION, SUSPENSION INTRA-ARTICULAR; INTRAMUSCULAR ONCE
Status: COMPLETED | OUTPATIENT
Start: 2024-09-18 | End: 2024-09-18

## 2024-09-18 RX ORDER — LIDOCAINE HYDROCHLORIDE 10 MG/ML
7 INJECTION, SOLUTION INFILTRATION; PERINEURAL ONCE
Status: COMPLETED | OUTPATIENT
Start: 2024-09-18 | End: 2024-09-18

## 2024-09-18 NOTE — PROGRESS NOTES
[facility]  OFFICE FOLLOW UP EVALUATION      HISTORY OF PRESENT ILLNESS:     Chief Complaint   Patient presents with    Follow - Up     Lov 6/10/24 follow up after Right knee aspiration and injection with Hyaluronic acid 20% improvement. Pain 5/10. No T/N. Aleve prn. No PT.       Patient is following up for right knee pain. She states that the gel injection has not provided as much improvement this time. She states pain is located anteriorly and along joint lines. She denies any radiation of the pain. She has noted swelling as well but denies any mechanical symptoms. Pain is 5/10 and worse with activity. She was recently in [place] and doing a lot of hiking has aggravated the symptoms.    PHYSICAL EXAM:   Wt 185 lb (83.9 kg)   LMP  (LMP Unknown)   BMI 28.13 kg/m²     KNEE:  Inspection: no erythema, mild swelling, no obvious deformity  Palpation: Tenderness to palpation of the medial joint line  ROM: Full active ROM in flexion and extension but restricted with end range of motion due to swelling  Strength: 5/5  Sensation: Intact to light touch in all dermatomes of the lower extremities    IMAGING:     MRI right knee completed on 8/25/2023 was reviewed which is notable for severe tricompartmental osteoarthritis most severe in the medial compartment and complex degenerative tear of the medial meniscus with chronic stress changes in the medial compartment. There is no acute insufficiency fracture. There is Baker's cyst as well.     All imaging results were reviewed and discussed with patient.      ASSESSMENT/PLAN:     1. Primary osteoarthritis of right knee        Mirta Miller is a 64 year old female following up for right knee pain secondary to osteoarthritis.  I recommended and performed right knee aspiration injection with corticosteroid. See procedure note for further details. Recommend topical treatment with ice, Tylenol as needed and follow up in 3 months.        The patient verbalized understanding with the plan and was in agreement. All questions/concerns were addressed and there were no barriers to learning.  Please note Dragon dictation software was used to dictate this note and may result in inadvertent typos.    Marcelina Martinez DO, FAAPMR & CAQSM  Physical Medicine and Rehabilitation  Sports and Spine Medicine    PAST MEDICAL HISTORY:     Past Medical History:    Actinic keratosis    Allergic rhinitis    Anesthesia complication    blood pressure bottomed out - 6-8 hrs after surgery. Revived on own when staff moved pt.    Anxiety    Anxiety state    Cancer (HCC)    Breast Cancer DX     Chronic rhinitis    Cough variant asthma (HCC)    Depression    Dyslipidemia    Environmental allergies    Esophageal reflux    History of neuroma    \"Neuroma 3 rt\"    HTN (hypertension), benign    Melanoma in situ (HCC)    Left chest    Metatarsalgia    \"modified insole / rx naprosyn\"    LAURY on CPAP    Osteoarthritis    PONV (postoperative nausea and vomiting)    Right knee injury    Arthroscopy    Sleep apnea    Visual impairment    glasses/contacts,dry eye         PAST SURGICAL HISTORY:     Past Surgical History:   Procedure Laterality Date          x 4    Colonoscopy  2013    per NG    Colonoscopy      2013    Hysteroscopy  2017    myosure polypectomy    Knee arthroscopy Right     x3 last     Maximus localization wire 1 site left (cpt=19281)      Maximus localization wire 1 site right (cpt=19281)      Other surgical history       arthroscopic surgery 3 times right knee meniscal tear    Radiation Bilateral 04/15/2019    recent radiation tx for breast cancer    Reduction left  2014    Reduction of large breast Bilateral 2014    Reduction right  2014         CURRENT MEDICATIONS:     Current Outpatient Medications   Medication Sig Dispense Refill    methylPREDNISolone 4 MG Oral Tablet Therapy Pack Take as directed 21 tablet 0    Phentermine HCl (LOMAIRA) 8  MG Oral Tab Take 1 tablet by mouth daily for 30 doses. 30 tablet 2    ALPRAZolam 0.5 MG Oral Tab Take 1 tablet (0.5 mg total) by mouth 2 (two) times daily as needed for Anxiety. 60 tablet 0    losartan 50 MG Oral Tab Take 1 tablet (50 mg total) by mouth daily. 90 tablet 3    rosuvastatin 10 MG Oral Tab rosuvastatin 10 mg tablet, [RxNorm: 951297]      pantoprazole 40 MG Oral Tab EC Take 1 tablet (40 mg total) by mouth daily.      metoprolol succinate  MG Oral Tablet 24 Hr Take 1 tablet (100 mg total) by mouth daily.      Azelastine HCl 137 MCG/SPRAY Nasal Solution 2 sprays by Nasal route 2 (two) times daily. 30 mL 5    anastrozole 1 MG Oral Tab tab Take 1 tablet (1 mg total) by mouth daily.      Probiotic Product (ALIGN OR) Take by mouth.           ALLERGIES:     Allergies   Allergen Reactions    Banana HIVES    Cantaloupe HIVES    Cephalosporins HIVES    Sulfamethoxazole HIVES    Trimethoprim HIVES    Milk-Related Compounds PAIN     Stomach pain, diarrhea    Dust Mite Extract ITCHING    Penicillins HIVES and RASH         FAMILY HISTORY:     Family History   Adopted: Yes   Family history unknown: Yes          SOCIAL HISTORY:     Social History     Socioeconomic History    Marital status:    Tobacco Use    Smoking status: Former     Current packs/day: 0.00     Types: Cigarettes     Quit date: 1986     Years since quittin.1     Passive exposure: Past    Smokeless tobacco: Never    Tobacco comments:     I have not smoked in over 35 years   Vaping Use    Vaping status: Never Used   Substance and Sexual Activity    Alcohol use: Yes     Alcohol/week: 2.0 standard drinks of alcohol     Types: 2 Cans of beer per week     Comment: minimal consumption    Drug use: No   Other Topics Concern    Caffeine Concern Yes     Comment: Coffee, 1 cups daily; Tea;     Exercise Yes     Comment: walking, yoga    Grew up on a farm No    History of tanning No    Outdoor occupation No    Pt has a pacemaker No    Pt has a  defibrillator No    Breast feeding No    Reaction to local anesthetic No    Left Handed No    Right Handed Yes    Currently spends a great deal of time in the sun No    Hx of Spending Great Deal of Time in Sun No    Bad sunburns in the past No    Tanning Salons in the Past No    Hx of Radiation Treatments Yes   Social History Narrative    The patient does not use an assistive device..      The patient does live in a home with stairs.     Social Determinants of Health      Received from [facility] [place], [facility] [place]    Housing Stability          REVIEW OF SYSTEMS:   A comprehensive 10 point review of systems was completed.  Pertinent positives and negatives noted in the the HPI.      LABS:   No results found for: \"EAG\", \"A1C\"  Lab Results   Component Value Date    WBC 4.6 09/28/2022    RBC 4.70 09/28/2022    HGB 14.5 09/28/2022    HCT 44.5 09/28/2022    MCV 94.7 09/28/2022    MCH 30.9 09/28/2022    MCHC 32.6 09/28/2022    RDW 12.0 09/28/2022    .0 09/28/2022    MPV 9.4 09/22/2018     Lab Results   Component Value Date    GLU 84 06/27/2024    BUN 12 06/27/2024    BUNCREA 11.3 06/27/2024    CREATSERUM 1.06 (H) 06/27/2024    ANIONGAP 6 06/27/2024    GFRNAA 71 10/06/2021    GFRAA 81 10/06/2021    CA 9.6 06/27/2024    OSMOCALC 295 06/27/2024    ALKPHO 80 06/27/2024    AST 33 06/27/2024    ALT 28 06/27/2024    ALKPHOS 46 06/13/2016    BILT 0.6 06/27/2024    TP 7.1 06/27/2024    ALB 4.7 06/27/2024    GLOBULIN 2.4 06/27/2024    AGRATIO 1.5 06/13/2016     06/27/2024    K 4.5 06/27/2024     06/27/2024    CO2 30.0 06/27/2024     No results found for: \"PTP\", \"PT\", \"INR\"  Lab Results   Component Value Date    VITD 72.2 09/28/2022    EVRA86JL 25.7 12/20/2014

## 2024-09-18 NOTE — TELEPHONE ENCOUNTER
Initiated authorization for Right knee aspiration and injection with corticosteroid. CPT/HCPCS 06203,  dx:M17.11 with Availity  Completed in the office today    Status: Approved - no auth required  Authorization is not required based on medical necessity when being performed, however is not a guarantee of payment and may be subject to review once claim is submitted-Covered Benefit

## 2024-09-18 NOTE — PROCEDURES
Procedure:  Right knee aspiration and injection with corticosteroid  The risks, benefits and anticipated outcomes of the procedure, the risks and benefits of the alternatives to the procedure, and the roles and tasks of the personnel to be involved, were discussed with the patient, and the patient consents to the procedure and agrees to proceed.  UNIVERSAL PROTOCOL / SAFETY CHECKLIST  Sign in Communication: Completed  Time Out:  Team Confirms the Correct Patient, Correct Procedure, Correct Site and Site Marking, Correct Position (if applicable), Prep and Dry Time (if applicable).      The procedure was carried out under sterile prep with  with sterile gel.  A 27 ga 1&1/4in needle was introduced and advanced for skin anesthesia with 3 cc of 1% lidocaine.  Then, a 18 gauge 1.5 in needle was advanced and 14 cc of serous synovial fluid was aspirated.  Following aspiration, a mixture of 4 cc of lidocaine and 1 cc of Kenalog (40mg/ml) was injected.   The patient tolerated the procedure without complication and was instructed in post-procedure precautions.    Marcelina Martinez DO, FAAPMR & CAQSM  Physical Medicine and Rehabilitation/Sports Medicine  [facility]

## 2024-09-25 ENCOUNTER — OFFICE VISIT (OUTPATIENT)
Dept: INTERNAL MEDICINE CLINIC | Facility: CLINIC | Age: 64
End: 2024-09-25
Payer: MEDICARE

## 2024-09-25 ENCOUNTER — PATIENT MESSAGE (OUTPATIENT)
Dept: OTHER | Age: 64
End: 2024-09-25

## 2024-09-25 VITALS
TEMPERATURE: 98 F | DIASTOLIC BLOOD PRESSURE: 76 MMHG | SYSTOLIC BLOOD PRESSURE: 120 MMHG | HEIGHT: 68 IN | OXYGEN SATURATION: 98 % | BODY MASS INDEX: 27.83 KG/M2 | WEIGHT: 183.63 LBS | HEART RATE: 71 BPM

## 2024-09-25 DIAGNOSIS — M85.89 OSTEOPENIA OF MULTIPLE SITES: ICD-10-CM

## 2024-09-25 DIAGNOSIS — Z00.00 MEDICARE ANNUAL WELLNESS VISIT, INITIAL: Primary | ICD-10-CM

## 2024-09-25 DIAGNOSIS — I10 HTN (HYPERTENSION), BENIGN: ICD-10-CM

## 2024-09-25 DIAGNOSIS — I51.9 LEFT VENTRICULAR SYSTOLIC DYSFUNCTION (LVSD): ICD-10-CM

## 2024-09-25 DIAGNOSIS — Z85.820 HISTORY OF MELANOMA: ICD-10-CM

## 2024-09-25 DIAGNOSIS — N18.31 STAGE 3A CHRONIC KIDNEY DISEASE (HCC): Chronic | ICD-10-CM

## 2024-09-25 DIAGNOSIS — G47.33 OSA ON CPAP: ICD-10-CM

## 2024-09-25 DIAGNOSIS — F41.1 GAD (GENERALIZED ANXIETY DISORDER): ICD-10-CM

## 2024-09-25 DIAGNOSIS — Z12.11 COLON CANCER SCREENING: ICD-10-CM

## 2024-09-25 DIAGNOSIS — Z85.3 HISTORY OF LEFT BREAST CANCER: ICD-10-CM

## 2024-09-25 DIAGNOSIS — E78.2 MIXED HYPERLIPIDEMIA: ICD-10-CM

## 2024-09-25 PROCEDURE — G0439 PPPS, SUBSEQ VISIT: HCPCS | Performed by: INTERNAL MEDICINE

## 2024-09-25 PROCEDURE — 96160 PT-FOCUSED HLTH RISK ASSMT: CPT | Performed by: INTERNAL MEDICINE

## 2024-09-25 NOTE — PROGRESS NOTES
Subjective:   Mrita Miller is a 64 year old female who presents for a MA AHA (Medicare Advantage Annual Health Assessment) and Medicare Initial Preventative Physical Exam (Welcome to Medicare- < 12 months on Medicare) and scheduled follow up of multiple significant but stable problems.       History/Other:   Fall Risk Assessment:   She has been screened for Falls and is low risk.      Cognitive Assessment:   She had a completely normal cognitive assessment - see flowsheet entries     Functional Ability/Status:   Mirta Miller has a completely normal functional assessment. See flowsheet for details.      Depression Screening (PHQ):  PHQ-2 SCORE: 0  , done 9/24/2024   If you checked off any problems, how difficult have these problems made it for you to do your work, take care of things at home, or get along with other people?: Not difficult at all    Last [place] Suicide Screening on 9/25/2024 was No Risk.         Advanced Directives:   She does have a Living Will but we do NOT have it on file in Epic.    She does have a POA but we do NOT have it on file in [facility].    Patient has Advance Care Planning documents present in EMR. Reviewed documents with patient (and family/surrogate if present).      Patient Active Problem List   Diagnosis    Allergic rhinitis due to pollen    Allergic rhinitis    Colon cancer screening    Diffuse cystic mastopathy    Numerous moles    LYNETTE (generalized anxiety disorder)    Malignant neoplasm of upper-inner quadrant of left breast in female, estrogen receptor positive (HCC)    Depression, major, single episode, moderate (HCC)    Panic disorder without agoraphobia    Arthritis of carpometacarpal (CMC) joint of both thumbs    Osteoarthritis of fingers of hands, bilateral    Numbness and tingling in both hands    HNP (herniated nucleus pulposus), cervical    Cervical radiculopathy    Spinal stenosis in cervical region    Primary osteoarthritis of knees, bilateral    HTN  (hypertension), benign    Overweight (BMI 25.0-29.9)    Dyslipidemia    LAURY on CPAP    Cough variant asthma (HCC)    Asthma with COPD (chronic obstructive pulmonary disease) ([facility])    CKD (chronic kidney disease) stage 3, GFR 30-59 ml/min ([facility])    Primary osteoarthritis of right knee    Mixed hyperlipidemia    Osteopenia of multiple sites    History of melanoma    History of left breast cancer    Left ventricular systolic dysfunction (LVSD)     Allergies:  She is allergic to banana, cantaloupe, cephalosporins, sulfamethoxazole, trimethoprim, milk-related compounds, dust mite extract, and penicillins.    Current Medications:  Outpatient Medications Marked as Taking for the 9/25/24 encounter (Office Visit) with Elliot Watson MD   Medication Sig    ALPRAZolam 0.5 MG Oral Tab Take 1 tablet (0.5 mg total) by mouth 2 (two) times daily as needed for Anxiety.    losartan 50 MG Oral Tab Take 1 tablet (50 mg total) by mouth daily.    rosuvastatin 10 MG Oral Tab rosuvastatin 10 mg tablet, [RxNorm: 615710]    pantoprazole 40 MG Oral Tab EC Take 1 tablet (40 mg total) by mouth daily.    metoprolol succinate  MG Oral Tablet 24 Hr Take 1 tablet (100 mg total) by mouth daily.    Azelastine HCl 137 MCG/SPRAY Nasal Solution 2 sprays by Nasal route 2 (two) times daily.    Probiotic Product (ALIGN OR) Take by mouth.       Medical History:  She  has a past medical history of Actinic keratosis, Allergic rhinitis, Anesthesia complication, Anxiety, Anxiety state, Cancer ([facility]), Chronic rhinitis, Cough variant asthma ([facility]) (2/12/2024), Depression, Dyslipidemia (03/27/2023), Environmental allergies (8/1965), Esophageal reflux, History of neuroma (04/26/2011), HTN (hypertension), benign (03/27/2023), Left ventricular systolic dysfunction (LVSD) (9/27/2024), Melanoma in situ ([facility]) (06/2022), Metatarsalgia (04/12/2011), LAURY on CPAP, Osteoarthritis, PONV (postoperative nausea and vomiting), Right knee injury (1989), Sleep apnea  (2022), and Visual impairment.  Surgical History:  She  has a past surgical history that includes reduction of large breast (Bilateral, ); knee arthroscopy (Right); ; colonoscopy (2013); reduction left (2014); reduction right (2014); yen localization wire 1 site right (cpt=19281) (); yen localization wire 1 site left (cpt=19281) (); colonoscopy; hysteroscopy (); radiation (Bilateral, 04/15/2019); and other surgical history.   Family History:  Her She was adopted. Family history is unknown by patient.  Social History:  She  reports that she quit smoking about 38 years ago. Her smoking use included cigarettes. She has been exposed to tobacco smoke. She has never used smokeless tobacco. She reports current alcohol use of about 2.0 standard drinks of alcohol per week. She reports that she does not use drugs.    Tobacco:  She smoked tobacco in the past but quit greater than 12 months ago.  Social History     Tobacco Use   Smoking Status Former    Current packs/day: 0.00    Types: Cigarettes    Quit date: 1986    Years since quittin.2    Passive exposure: Past   Smokeless Tobacco Never   Tobacco Comments    I have not smoked in over 35 years        CAGE Alcohol Screen:   CAGE screening score of 0 on 2024, showing low risk of alcohol abuse.      Patient Care Team:  Elliot Watson MD as PCP - General (Internal Medicine)  Julia Chiang MD (OBSTETRICS & GYNECOLOGY)  Lianne Gaspar MD as Psychiatrist/APN (Psychiatry)  Marcelina Martinez DO (Physical Medicine)  Julianna Coreas OT as Occupational Therapist (Occupational Therapist)  Agnes Hinojosa (Physical Therapy)  Massimo Biswas MD as Consulting Physician (BARIATRICS)    Review of Systems  GENERAL: feels well otherwise  SKIN: denies any unusual skin lesions  EYES: denies blurred vision or double vision  HEENT: denies nasal congestion, sinus pain or ST  LUNGS: denies shortness of breath with  exertion  CARDIOVASCULAR: denies chest pain on exertion  GI: denies abdominal pain, denies heartburn  : denies dysuria, vaginal discharge or itching, no complaint of urinary incontinence ; no hematurai  MUSCULOSKELETAL: denies back pain  NEURO: denies headaches  PSYCHE: denies depression or anxiety  HEMATOLOGIC: denies hx of anemia  ENDOCRINE: denies thyroid history  ALL/ASTHMA:  hx of allergy or asthma    Objective:   Physical Exam  General Appearance:  Alert, cooperative, no distress, appears stated age   Head:  Normocephalic, without obvious abnormality, atraumatic   Eyes:  PERRL, conjunctiva/corneas clear, EOM's intact both eyes   Ears:  Normal TM's and external ear canals, both ears   Nose: Nares normal, septum midline,mucosa normal, no drainage or sinus tenderness   Throat: Lips, mucosa, and tongue normal; teeth and gums normal   Neck: Supple, symmetrical, trachea midline, no adenopathy;  thyroid: not enlarged, symmetric, no tenderness/mass/nodules; no carotid bruit or JVD   Back:   Symmetric, no curvature, ROM normal, no CVA tenderness   Lungs:   Clear to auscultation bilaterally, respirations unlabored   Heart:  Regular rate and rhythm, S1 and S2 normal, no murmur, rub, or gallop   Abdomen:   Soft, non-tender, bowel sounds active all four quadrants,  no masses, no organomegaly   Pelvic: Deferred   Extremities: Extremities normal, atraumatic, no cyanosis or edema   Pulses: 2+ and symmetric   Skin: Skin color, texture, turgor normal, no rashes or lesions   Lymph nodes: Cervical, supraclavicular, and axillary nodes normal   Neurologic: Normal       /76   Pulse 71   Temp 97.7 °F (36.5 °C) (Tympanic)   Ht 5' 8\" (1.727 m)   Wt 183 lb 9.6 oz (83.3 kg)   LMP  (LMP Unknown)   SpO2 98%   BMI 27.92 kg/m²  Estimated body mass index is 27.92 kg/m² as calculated from the following:    Height as of this encounter: 5' 8\" (1.727 m).    Weight as of this encounter: 183 lb 9.6 oz (83.3 kg).    Medicare Hearing  Assessment:   Hearing Screening    Time taken: 9/25/2024  9:02 AM  Screening Method: Questionnaire  I have a problem hearing over the telephone: No I have trouble following the conversations when two or more people are talking at the same time: No   I have trouble understanding things on the TV: No I have to strain to understand conversations: Sometimes   I have to worry about missing the telephone ring or doorbell: No I have trouble hearing conversations in a noisy background such as a crowded room or restaurant: Sometimes   I get confused about where sounds come from: No I misunderstand some words in a sentence and need to ask people to repeat themselves: No   I especially have trouble understanding the speech of women and children: No I have trouble understanding the speaker in a large room such as at a meeting or place of Yazidism: Sometimes   Many people I talk to seem to mumble (or don't speak clearly): No People get annoyed because I misunderstand what they say: No   I misunderstand what others are saying and make inappropriate responses: No I avoid social activities because I cannot hear well and fear I will reply improperly: No   Family members and friends have told me they think I may have hearing loss: No             Visual Acuity:   Right Eye Visual Acuity: Corrected Right Eye Chart Acuity: 20/70   Left Eye Visual Acuity: Corrected Left Eye Chart Acuity: 20/50   Both Eyes Visual Acuity: Corrected Both Eyes Chart Acuity: 20/70   Able To Tolerate Visual Acuity: Yes        Assessment & Plan:   Mirta Miller is a 64 year old female who presents for a Medicare Assessment.     (Z00.00) Medicare annual wellness visit, initial  (primary encounter diagnosis)  Plan: CBC With Differential With Platelet, Comp         Metabolic Panel (14), Hemoglobin A1C, TSH W         Reflex To Free T4, Urinalysis, Routine        Routine labs ordered.  Pt advsied to get her flushot, covid booster .    (I10) HTN (hypertension),  benign  Plan: bp controlled. Cpm.     (F41.1) LYNETTE (generalized anxiety disorder)  Plan: stable on xanax prn.     (E78.2) Mixed hyperlipidemia  Plan: check lipid panel; continue with low chol diet and chol med.     (M85.89) Osteopenia of multiple sites  Plan: continue with calcium and vit D supplement.     (Z12.11) Colon cancer screening  Plan: pt is current with her colonscpy.     (Z85.820) History of melanoma  Plan:  had MOHs surgery before. And continues to ffup with derm; no recurrence.     (Z85.3) History of left breast cancer  Plan: had been treated with lumpectomy, radiotx and just finished her adjuvant hormonal tx and remained in remission; pt continues to ffup with her oncologist.     (I51.9) Left ventricular systolic dysfunction (LVSD)  Plan: Cardio Referral - Internal        Developed after she had covid infection about 2 yrs ago. Her LV systolic fxn had recovered since then and pt remains in ARB and beta blocker; continue ffup with cardio.     (G47.33) LAURY on CPAP  Plan: continue use of cpap.     (N18.31) Stage 3a chronic kidney disease (HCC)  Plan: check renal panel; continue to avoid nsaids.      The patient indicates understanding of these issues and agrees to the plan.  Reinforced healthy diet, lifestyle, and exercise.      No follow-ups on file.     Elliot Watson MD, 9/25/2024     Supplementary Documentation:   General Health:  In the past six months, have you lost more than 10 pounds without trying?: 2 - No  Has your appetite been poor?: No  Type of Diet: Balanced  How does the patient maintain a good energy level?: Appropriate Exercise  How would you describe your daily physical activity?: Moderate  How would you describe your current health state?: Fair  How do you maintain positive mental well-being?: Social Interaction;Visiting Friends  On a scale of 0 to 10, with 0 being no pain and 10 being severe pain, what is your pain level?: 3 - (Mild)  In the past six months, have you experienced  urine leakage?: 0-No  At any time do you feel concerned for the safety/well-being of yourself and/or your children, in your home or elsewhere?: No  Have you had any immunizations at another office such as Influenza, Hepatitis B, Tetanus, or Pneumococcal?: No    Health Maintenance   Topic Date Due    Pneumococcal Vaccine: 65+ Years (2 of 2 - PCV) 09/08/2021    MA Annual Health Assessment  Never done    Mammogram  04/19/2024    DEXA Scan  Never done    COVID-19 Vaccine (5 - 2023-24 season) 09/01/2024    Influenza Vaccine (1) 10/01/2024    Pap Smear  01/06/2026    Colorectal Cancer Screening  11/03/2026    Annual Depression Screening  Completed    Fall Risk Screening (Annual)  Completed    Zoster Vaccines  Completed

## 2024-09-26 ENCOUNTER — TELEPHONE (OUTPATIENT)
Dept: PULMONOLOGY | Facility: CLINIC | Age: 64
End: 2024-09-26

## 2024-09-26 NOTE — TELEPHONE ENCOUNTER
Received fax from [facility] requesting order to be signed for oral appliance. Placed in Dr. Gonzalez folder for review.

## 2024-09-26 NOTE — TELEPHONE ENCOUNTER
From: Nahomy DENNIS  To: Mirta Miller  Sent: 9/25/2024 2:57 PM CDT  Subject: Problem    Mirta Mercer,  Thank you for reaching out to our office. It is appreciated that you are willing to take an active role in managing your health and your medical record.  Changes to your problem list would best be addressed during a face to face with your provider. Please ask your provider about this issue at your next office visit.     Vulvar dermatitis 4/22/2015 Mirta Miller done   Vaginitis 4/22/2015 Mirta Miller done   Candidal skin infection 6/22/2015 Mirta Miller done   Allergic rhinitis due to pollen 7/21/2014 Mirta Milelr done   Allergic rhinitis 12/13/2014 Mirta Miller Old - from 2014   Poor posture 10/1/2019 Mirta Miller       Thank you again for using [facility].   Nahomy SEGURA

## 2024-09-27 PROBLEM — I51.9 LEFT VENTRICULAR SYSTOLIC DYSFUNCTION (LVSD): Status: ACTIVE | Noted: 2024-09-27

## 2024-09-27 PROBLEM — E78.2 MIXED HYPERLIPIDEMIA: Status: ACTIVE | Noted: 2024-09-27

## 2024-09-27 PROBLEM — Z85.820 HISTORY OF MELANOMA: Status: ACTIVE | Noted: 2024-09-27

## 2024-09-27 PROBLEM — M85.89 OSTEOPENIA OF MULTIPLE SITES: Status: ACTIVE | Noted: 2024-09-27

## 2024-09-27 PROBLEM — Z85.3 HISTORY OF LEFT BREAST CANCER: Status: ACTIVE | Noted: 2024-09-27

## 2024-09-27 PROBLEM — I51.89 LEFT VENTRICULAR SYSTOLIC DYSFUNCTION (LVSD): Status: ACTIVE | Noted: 2024-09-27

## 2024-09-30 ENCOUNTER — TELEPHONE (OUTPATIENT)
Dept: OBGYN CLINIC | Facility: CLINIC | Age: 64
End: 2024-09-30

## 2024-09-30 NOTE — TELEPHONE ENCOUNTER
Patient Mirta Cueto calling to report she treated herself for yeast with Monistat 3 day, last dose taken 9/27. On the afternoon of 9/27, she noted very faint pink spotting on wiping. Spotting has continue through today, still only on wiping. She denies pain. Yeast symptoms did resolve with treatment.     Advised likely related to using applicator for monistat. If bleeding increased to flow or if yeast symptoms return, should be seen in office.     To Dr. Chiang for sign off or further recommendations.

## 2024-09-30 NOTE — TELEPHONE ENCOUNTER
Patient recently had a yeast infection that was treated with over the counter Monistat. For the past few days she has noticed very faint bleeding when she wipes after urinating. Please advise.

## 2024-10-01 NOTE — TELEPHONE ENCOUNTER
Order signed by Dr Gonzalez and faxed back to [place]. Confirmation received and sent to scanning

## 2024-10-08 ENCOUNTER — LAB ENCOUNTER (OUTPATIENT)
Dept: LAB | Age: 64
End: 2024-10-08
Attending: INTERNAL MEDICINE
Payer: MEDICARE

## 2024-10-08 DIAGNOSIS — Z00.00 MEDICARE ANNUAL WELLNESS VISIT, INITIAL: ICD-10-CM

## 2024-10-08 LAB
ALBUMIN SERPL-MCNC: 4.7 G/DL (ref 3.2–4.8)
ALBUMIN/GLOB SERPL: 2 {RATIO} (ref 1–2)
ALP LIVER SERPL-CCNC: 74 U/L
ALT SERPL-CCNC: 28 U/L
ANION GAP SERPL CALC-SCNC: 6 MMOL/L (ref 0–18)
AST SERPL-CCNC: 28 U/L (ref ?–34)
BASOPHILS # BLD AUTO: 0.06 X10(3) UL (ref 0–0.2)
BASOPHILS NFR BLD AUTO: 1.1 %
BILIRUB SERPL-MCNC: 0.7 MG/DL (ref 0.2–1.1)
BILIRUB UR QL: NEGATIVE
BUN BLD-MCNC: 14 MG/DL (ref 9–23)
BUN/CREAT SERPL: 13.7 (ref 10–20)
CALCIUM BLD-MCNC: 9.9 MG/DL (ref 8.7–10.4)
CHLORIDE SERPL-SCNC: 107 MMOL/L (ref 98–112)
CLARITY UR: CLEAR
CO2 SERPL-SCNC: 29 MMOL/L (ref 21–32)
COLOR UR: YELLOW
CREAT BLD-MCNC: 1.02 MG/DL
DEPRECATED RDW RBC AUTO: 45.2 FL (ref 35.1–46.3)
EGFRCR SERPLBLD CKD-EPI 2021: 61 ML/MIN/1.73M2 (ref 60–?)
EOSINOPHIL # BLD AUTO: 0.21 X10(3) UL (ref 0–0.7)
EOSINOPHIL NFR BLD AUTO: 3.7 %
ERYTHROCYTE [DISTWIDTH] IN BLOOD BY AUTOMATED COUNT: 12.4 % (ref 11–15)
EST. AVERAGE GLUCOSE BLD GHB EST-MCNC: 111 MG/DL (ref 68–126)
FASTING STATUS PATIENT QL REPORTED: YES
GLOBULIN PLAS-MCNC: 2.3 G/DL (ref 2–3.5)
GLUCOSE BLD-MCNC: 89 MG/DL (ref 70–99)
GLUCOSE UR-MCNC: NORMAL MG/DL
HBA1C MFR BLD: 5.5 % (ref ?–5.7)
HCT VFR BLD AUTO: 43.7 %
HGB BLD-MCNC: 14 G/DL
IMM GRANULOCYTES # BLD AUTO: 0.01 X10(3) UL (ref 0–1)
IMM GRANULOCYTES NFR BLD: 0.2 %
KETONES UR-MCNC: NEGATIVE MG/DL
LEUKOCYTE ESTERASE UR QL STRIP.AUTO: NEGATIVE
LYMPHOCYTES # BLD AUTO: 1.76 X10(3) UL (ref 1–4)
LYMPHOCYTES NFR BLD AUTO: 31 %
MCH RBC QN AUTO: 31.4 PG (ref 26–34)
MCHC RBC AUTO-ENTMCNC: 32 G/DL (ref 31–37)
MCV RBC AUTO: 98 FL
MONOCYTES # BLD AUTO: 0.52 X10(3) UL (ref 0.1–1)
MONOCYTES NFR BLD AUTO: 9.2 %
NEUTROPHILS # BLD AUTO: 3.11 X10 (3) UL (ref 1.5–7.7)
NEUTROPHILS # BLD AUTO: 3.11 X10(3) UL (ref 1.5–7.7)
NEUTROPHILS NFR BLD AUTO: 54.8 %
NITRITE UR QL STRIP.AUTO: NEGATIVE
OSMOLALITY SERPL CALC.SUM OF ELEC: 294 MOSM/KG (ref 275–295)
PH UR: 5.5 [PH] (ref 5–8)
PLATELET # BLD AUTO: 227 10(3)UL (ref 150–450)
POTASSIUM SERPL-SCNC: 4.5 MMOL/L (ref 3.5–5.1)
PROT SERPL-MCNC: 7 G/DL (ref 5.7–8.2)
RBC # BLD AUTO: 4.46 X10(6)UL
SODIUM SERPL-SCNC: 142 MMOL/L (ref 136–145)
SP GR UR STRIP: 1.02 (ref 1–1.03)
TSI SER-ACNC: 4.65 MIU/ML (ref 0.55–4.78)
UROBILINOGEN UR STRIP-ACNC: NORMAL
WBC # BLD AUTO: 5.7 X10(3) UL (ref 4–11)

## 2024-10-08 PROCEDURE — 85025 COMPLETE CBC W/AUTO DIFF WBC: CPT

## 2024-10-08 PROCEDURE — 81001 URINALYSIS AUTO W/SCOPE: CPT

## 2024-10-08 PROCEDURE — 80053 COMPREHEN METABOLIC PANEL: CPT

## 2024-10-08 PROCEDURE — 36415 COLL VENOUS BLD VENIPUNCTURE: CPT

## 2024-10-08 PROCEDURE — 84443 ASSAY THYROID STIM HORMONE: CPT

## 2024-10-08 PROCEDURE — 83036 HEMOGLOBIN GLYCOSYLATED A1C: CPT

## 2024-10-09 ENCOUNTER — TELEPHONE (OUTPATIENT)
Dept: INTERNAL MEDICINE CLINIC | Facility: CLINIC | Age: 64
End: 2024-10-09

## 2024-10-10 NOTE — TELEPHONE ENCOUNTER
Spoke with patient, Date of Birth verified  She is looking for lab result done 10-8-24.  She was informed pending MD review.   pls advise, thanks in advance.

## 2024-10-10 NOTE — TELEPHONE ENCOUNTER
Her CBC, chem panel, TSH thyroid test , A1c diabetic test  were normal.  Ua showed normal number of wbc and rbc in urine so urine is fine.

## 2024-10-10 NOTE — TELEPHONE ENCOUNTER
Spoke with patient, (  Name and date of birth verified ) informed of Dr. Watson's  result notes below    Patient verbalizes understanding, very happy with the results

## 2024-10-15 DIAGNOSIS — F41.1 GAD (GENERALIZED ANXIETY DISORDER): ICD-10-CM

## 2024-10-16 ENCOUNTER — TELEPHONE (OUTPATIENT)
Dept: OBGYN CLINIC | Facility: CLINIC | Age: 64
End: 2024-10-16

## 2024-10-16 NOTE — TELEPHONE ENCOUNTER
Patient called in regarding addendum notes on 10/16/2024,  Patient returning nurse call, request call back

## 2024-10-17 NOTE — TELEPHONE ENCOUNTER
Please review. Protocol Failed; No Protocol      Recent fills: 6/3/2024, 8/6/2024  Last Rx written: 8/6/2024  Last office visit: 9/25/2024          Requested Prescriptions   Pending Prescriptions Disp Refills    ALPRAZOLAM 0.5 MG Oral Tab [Pharmacy Med Name: Alprazolam 0.5 Mg Tab Acta] 60 tablet 0     Sig: Take 1 tablet (0.5 mg total) by mouth 2 (two) times daily as needed for Anxiety.       Controlled Substance Medication Failed - 10/17/2024  2:48 PM        Failed - This medication is a controlled substance - forward to provider to refill               Future Appointments         Provider Department Appt Notes    Tomorrow Julia Chiang MD [facility] - OB/GYN external vaginal itching    In 5 days Yael Ryan MD [facility] Recent thyroid number - HST increase.    In 2 months Sonido Gonzalez MD [facility] MED CHECK, SLEEP APNEA, ASTHMA-    In 3 months Floresita Plascencia MD [facility] 3 mon f/u    In 4 months Massimo Biswas MD [facility]           Recent Outpatient Visits              3 weeks ago Medicare annual wellness visit, initial    [facility] Elliot Watson MD    Office Visit    4 weeks ago Primary osteoarthritis of right knee    [facility] Marcelina Martinez DO    Office Visit    1 month ago Sciatica of left side    [facility] Leyla Ordoñez APRN    Office Visit    1 month ago HTN (hypertension), benign    [facility] Massimo Biswas MD    Office Visit    2 months ago AK (actinic keratosis)    [facility] Floresita Plascencia MD    Office Visit

## 2024-10-18 ENCOUNTER — OFFICE VISIT (OUTPATIENT)
Dept: OBGYN CLINIC | Facility: CLINIC | Age: 64
End: 2024-10-18
Payer: MEDICARE

## 2024-10-18 VITALS
BODY MASS INDEX: 28 KG/M2 | DIASTOLIC BLOOD PRESSURE: 77 MMHG | WEIGHT: 184.63 LBS | SYSTOLIC BLOOD PRESSURE: 130 MMHG | HEART RATE: 69 BPM

## 2024-10-18 DIAGNOSIS — L29.2 VULVAR ITCHING: Primary | ICD-10-CM

## 2024-10-18 PROCEDURE — 99213 OFFICE O/P EST LOW 20 MIN: CPT | Performed by: OBSTETRICS & GYNECOLOGY

## 2024-10-18 RX ORDER — ALPRAZOLAM 0.5 MG
0.5 TABLET ORAL 2 TIMES DAILY PRN
Qty: 60 TABLET | Refills: 0 | Status: SHIPPED | OUTPATIENT
Start: 2024-10-18

## 2024-10-18 NOTE — PROGRESS NOTES
Mirta Miller    1960       Chief Complaint   Patient presents with    Gyn Problem     C/O VAGINAL ITCHING & ODOR      One month ago she developed intense vulvar itching with no discharge- she used monistat 7 with no relief and then symptoms intensified again so she used monistat 3- symptoms have slightly decreased in the last 2 days but persistent.  We discussed the symptoms of yeast infection and that a discharge usually present.  She does swim for exercise so we discussed use of a moisture barrier such as vaseline before she swims and to change her wet clothes immediately.      Past Medical History:    Actinic keratosis    Allergic rhinitis    Anesthesia complication    blood pressure bottomed out - 6-8 hrs after surgery. Revived on own when staff moved pt.    Anxiety    Anxiety state    Cancer (HCC)    Breast Cancer DX     Chronic rhinitis    Cough variant asthma (HCC)    Depression    Dyslipidemia    Environmental allergies    Esophageal reflux    History of neuroma    \"Neuroma 3 rt\"    HTN (hypertension), benign    Left ventricular systolic dysfunction (LVSD)    Melanoma in situ (HCC)    Left chest    Metatarsalgia    \"modified insole / rx naprosyn\"    LAURY on CPAP    Osteoarthritis    PONV (postoperative nausea and vomiting)    Right knee injury    Arthroscopy    Sleep apnea    Visual impairment    glasses/contacts,dry eye       Past Surgical History:   Procedure Laterality Date          x 4    Colonoscopy  2013    per NG    Colonoscopy      2013    Hysteroscopy  2017    myosure polypectomy    Knee arthroscopy Right     x3 last     Maximus localization wire 1 site left (cpt=19281)      Maximus localization wire 1 site right (cpt=19281)      Other surgical history       arthroscopic surgery 3 times right knee meniscal tear    Radiation Bilateral 04/15/2019    recent radiation tx for breast cancer    Reduction left  2014    Reduction of large breast Bilateral      Reduction right  05/2014        Hx Prior Abnormal Pap: No  Pap Date: 01/06/23  Pap Result Notes: PAP NEG/HPV NEG  Follow Up Recommendation: MAMMO 12-4- 18 SUSPICIOUS      Medications Ordered Prior to Encounter[1]      PHYSICAL EXAM:       Constitutional: well developed, well nourished  Head/Face: normocephalic  Psychiatric:  Oriented to time, place, person and situation. Appropriate mood and affect    Pelvic Exam:  External Genitalia: normal appearance, hair distribution, and no lesions  Urethral Meatus:  normal in size, location, without lesions and prolapse  Vagina:  Normal appearance without lesions, +mod-large creamy white discharge  Cervix:  Normal without tenderness on motion  Perineum: normal  Anus: no hemorhroids      Mirta Cueto was seen today for gyn problem.    Diagnoses and all orders for this visit:    Vulvar itching  -     Vaginitis Vaginosis PCR Panel; Future  -     Vaginitis Vaginosis PCR Panel                   [1]   Current Outpatient Medications on File Prior to Visit   Medication Sig Dispense Refill    ALPRAZolam 0.5 MG Oral Tab Take 1 tablet (0.5 mg total) by mouth 2 (two) times daily as needed for Anxiety. 60 tablet 0    losartan 50 MG Oral Tab Take 1 tablet (50 mg total) by mouth daily. 90 tablet 3    rosuvastatin 10 MG Oral Tab rosuvastatin 10 mg tablet, [RxNorm: 851404]      pantoprazole 40 MG Oral Tab EC Take 1 tablet (40 mg total) by mouth daily.      metoprolol succinate  MG Oral Tablet 24 Hr Take 1 tablet (100 mg total) by mouth daily.      Azelastine HCl 137 MCG/SPRAY Nasal Solution 2 sprays by Nasal route 2 (two) times daily. 30 mL 5    Probiotic Product (ALIGN OR) Take by mouth.       No current facility-administered medications on file prior to visit.

## 2024-10-19 LAB
BV BACTERIA DNA VAG QL NAA+PROBE: NEGATIVE
C GLABRATA DNA VAG QL NAA+PROBE: NEGATIVE
C KRUSEI DNA VAG QL NAA+PROBE: NEGATIVE
CANDIDA DNA VAG QL NAA+PROBE: NEGATIVE
T VAGINALIS DNA VAG QL NAA+PROBE: NEGATIVE

## 2024-10-20 ENCOUNTER — PATIENT MESSAGE (OUTPATIENT)
Dept: OBGYN CLINIC | Facility: CLINIC | Age: 64
End: 2024-10-20

## 2024-10-21 RX ORDER — CLOBETASOL PROPIONATE 0.5 MG/G
OINTMENT TOPICAL
Qty: 15 G | Refills: 0 | Status: SHIPPED | OUTPATIENT
Start: 2024-10-21

## 2024-10-21 NOTE — TELEPHONE ENCOUNTER
Please send in clobetasol ointment apply bid until symptoms resolve, 1 tube 15g no refills- please advise her to only use a pea size amount with each dose

## 2024-10-22 ENCOUNTER — OFFICE VISIT (OUTPATIENT)
Dept: ENDOCRINOLOGY CLINIC | Facility: CLINIC | Age: 64
End: 2024-10-22
Payer: MEDICARE

## 2024-10-22 VITALS
SYSTOLIC BLOOD PRESSURE: 132 MMHG | HEART RATE: 89 BPM | BODY MASS INDEX: 27.43 KG/M2 | DIASTOLIC BLOOD PRESSURE: 81 MMHG | WEIGHT: 181 LBS | HEIGHT: 68 IN

## 2024-10-22 DIAGNOSIS — Z13.29 THYROID DISORDER SCREENING: Primary | ICD-10-CM

## 2024-10-22 PROCEDURE — 99213 OFFICE O/P EST LOW 20 MIN: CPT | Performed by: INTERNAL MEDICINE

## 2024-10-22 NOTE — PROGRESS NOTES
FU VISIT:     CHIEF COMPLAINT:    Chief Complaint   Patient presents with    Thyroid Problem     Pt is here for follow up for thyroid         HISTORY OF PRESENT ILLNESS:   Mirta Miller is a 64 year old female who presents for evaluation of tyroid labs  She reports fatigue  TSH is high normal on recent blood work     Patient is adopted and FH is not available      PAST MEDICAL HISTORY:   Past Medical History:    Actinic keratosis    Allergic rhinitis    Anesthesia complication    blood pressure bottomed out - 6-8 hrs after surgery. Revived on own when staff moved pt.    Anxiety    Anxiety state    Cancer (HCC)    Breast Cancer DX     Chronic rhinitis    Cough variant asthma (HCC)    Depression    Dyslipidemia    Environmental allergies    Esophageal reflux    History of neuroma    \"Neuroma 3 rt\"    HTN (hypertension), benign    Left ventricular systolic dysfunction (LVSD)    Melanoma in situ (HCC)    Left chest    Metatarsalgia    \"modified insole / rx naprosyn\"    LAURY on CPAP    Osteoarthritis    PONV (postoperative nausea and vomiting)    Right knee injury    Arthroscopy    Sleep apnea    Visual impairment    glasses/contacts,dry eye       PAST SURGICAL HISTORY:   Past Surgical History:   Procedure Laterality Date          x 4    Colonoscopy  2013    per NG    Colonoscopy      2013    Hysteroscopy  2017    myosure polypectomy    Knee arthroscopy Right     x3 last     Maximus localization wire 1 site left (cpt=19281)      Maximus localization wire 1 site right (cpt=19281)      Other surgical history       arthroscopic surgery 3 times right knee meniscal tear    Radiation Bilateral 04/15/2019    recent radiation tx for breast cancer    Reduction left  2014    Reduction of large breast Bilateral 2014    Reduction right  2014       CURRENT MEDICATIONS:    Current Outpatient Medications   Medication Sig Dispense Refill    clobetasol 0.05 % External Ointment Apply a pea size amount to  affected area twice daily until symptoms resolve 15 g 0    ALPRAZolam 0.5 MG Oral Tab Take 1 tablet (0.5 mg total) by mouth 2 (two) times daily as needed for Anxiety. 60 tablet 0    losartan 50 MG Oral Tab Take 1 tablet (50 mg total) by mouth daily. 90 tablet 3    rosuvastatin 10 MG Oral Tab rosuvastatin 10 mg tablet, [RxNorm: 973249]      pantoprazole 40 MG Oral Tab EC Take 1 tablet (40 mg total) by mouth daily.      metoprolol succinate  MG Oral Tablet 24 Hr Take 1 tablet (100 mg total) by mouth daily.      Azelastine HCl 137 MCG/SPRAY Nasal Solution 2 sprays by Nasal route 2 (two) times daily. 30 mL 5    Probiotic Product (ALIGN OR) Take by mouth.         ALLERGIES:  Allergies   Allergen Reactions    Banana HIVES    Cantaloupe HIVES    Cephalosporins HIVES    Sulfamethoxazole HIVES    Trimethoprim HIVES    Milk-Related Compounds PAIN     Stomach pain, diarrhea    Dust Mite Extract ITCHING    Penicillins HIVES and RASH       SOCIAL HISTORY:    Social History     Socioeconomic History    Marital status:    Tobacco Use    Smoking status: Former     Current packs/day: 0.00     Types: Cigarettes     Quit date: 1986     Years since quittin.2     Passive exposure: Past    Smokeless tobacco: Never    Tobacco comments:     I have not smoked in over 35 years   Vaping Use    Vaping status: Never Used   Substance and Sexual Activity    Alcohol use: Yes     Alcohol/week: 2.0 standard drinks of alcohol     Types: 2 Cans of beer per week     Comment: minimal consumption    Drug use: No   Other Topics Concern    Caffeine Concern Yes     Comment: Coffee, 1 cups daily; Tea;     Exercise Yes     Comment: walking, yoga    Grew up on a farm No    History of tanning No    Outdoor occupation No    Pt has a pacemaker No    Pt has a defibrillator No    Breast feeding No    Reaction to local anesthetic No    Left Handed No    Right Handed Yes    Currently spends a great deal of time in the sun No    Hx of Spending  Great Deal of Time in Sun No    Bad sunburns in the past No    Tanning Salons in the Past No    Hx of Radiation Treatments Yes       FAMILY HISTORY:   Family History   Adopted: Yes   Family history unknown: Yes       ASSESSMENTS:     REVIEW OF SYSTEMS:  Constitutional: Negative for: weight change, fever, + fatigue, cold/heat intolerance  Eyes: Negative for:  Visual changes, proptosis, blurring  ENT: Negative for:  dysphagia, neck swelling, dysphonia  Respiratory: Negative for:  dyspnea, cough  Cardiovascular: Negative for:  chest pain, palpitations, orthopnea  GI: Negative for:  abdominal pain, nausea, vomiting, diarrhea, constipation, bleeding  Neurology: Negative for: headache, numbness, weakness  Genito-Urinary: Negative for: dysuria, frequency  Psychiatric: Negative for:  depression, anxiety  Hematology/Lymphatics: Negative for: bruising, lower extremity edema  Endocrine: Negative for: polyuria, polydypsia  Skin: Negative for: rash, blister, cellulitis,      PHYSICAL EXAM:   Vitals:    10/22/24 1550   BP: 132/81   Pulse: 89   Weight: 181 lb (82.1 kg)   Height: 5' 8\" (1.727 m)     BMI: Body mass index is 27.52 kg/m².         General Appearance:  alert, well developed, in no acute distress  Head: Atraumatic  Eyes:  normal conjunctivae, sclera., normal sclera and normal pupils  Throat/Neck: normal sound to voice. Normal hearing, normal speech, no significant thyroid enlargement noted  Respiratory:  Speaking in full sentences, non-labored. no increased work of breathing, no audible wheezing    Neuro: motor grossly intact, moving all extremities without difficulty  Psychiatric:  oriented to time, self, and place  Extremities: no obvious extremity swelling    DATA:     Pertinent data reviewed      ASSESSMENT AND PLAN:    Patient is a 64 year old female with fatigue and high normal TSH   Discussed that although TSH is slightly higher than last time,  it is in the normal range  Discussed that TSH is the most sensitive  marker of thyroid function and levels can fluctuate  Will recheck labs in 3 months to monitor for stability   If she develops symptom sof hypothyroidism like weight gain, worsening of fatigue, constipation; she will notify me and we can recheck labs sooner    Patient verbalized a complete  understanding of all of the above and did not have any further questions.   Call for results        Yael Ryan MD        Patient verbalized a complete  understanding of all of the above and did not have any further questions.

## 2024-10-31 ENCOUNTER — HOSPITAL ENCOUNTER (EMERGENCY)
Facility: HOSPITAL | Age: 64
Discharge: HOME | End: 2024-10-31
Attending: EMERGENCY MEDICINE
Payer: MEDICARE

## 2024-10-31 ENCOUNTER — APPOINTMENT (OUTPATIENT)
Dept: CT IMAGING | Facility: HOSPITAL | Age: 64
End: 2024-10-31
Attending: EMERGENCY MEDICINE
Payer: MEDICARE

## 2024-10-31 VITALS
OXYGEN SATURATION: 99 % | WEIGHT: 175 LBS | DIASTOLIC BLOOD PRESSURE: 53 MMHG | SYSTOLIC BLOOD PRESSURE: 109 MMHG | HEIGHT: 68 IN | RESPIRATION RATE: 14 BRPM | BODY MASS INDEX: 26.52 KG/M2 | TEMPERATURE: 97 F | HEART RATE: 74 BPM

## 2024-10-31 DIAGNOSIS — K57.92 ACUTE DIVERTICULITIS: Primary | ICD-10-CM

## 2024-10-31 LAB
ALBUMIN SERPL-MCNC: 4.7 G/DL (ref 3.2–4.8)
ALBUMIN/GLOB SERPL: 1.7 {RATIO} (ref 1–2)
ALP LIVER SERPL-CCNC: 88 U/L
ALT SERPL-CCNC: 26 U/L
ANION GAP SERPL CALC-SCNC: 8 MMOL/L (ref 0–18)
AST SERPL-CCNC: 30 U/L (ref ?–34)
BASOPHILS # BLD AUTO: 0.05 X10(3) UL (ref 0–0.2)
BASOPHILS NFR BLD AUTO: 0.3 %
BILIRUB SERPL-MCNC: 1.6 MG/DL (ref 0.2–1.1)
BILIRUB UR QL: NEGATIVE
BUN BLD-MCNC: 10 MG/DL (ref 9–23)
BUN/CREAT SERPL: 9.7 (ref 10–20)
CALCIUM BLD-MCNC: 9.3 MG/DL (ref 8.7–10.4)
CHLORIDE SERPL-SCNC: 105 MMOL/L (ref 98–112)
CO2 SERPL-SCNC: 25 MMOL/L (ref 21–32)
COLOR UR: YELLOW
CREAT BLD-MCNC: 1.03 MG/DL
DEPRECATED RDW RBC AUTO: 43.6 FL (ref 35.1–46.3)
EGFRCR SERPLBLD CKD-EPI 2021: 60 ML/MIN/1.73M2 (ref 60–?)
EOSINOPHIL # BLD AUTO: 0.05 X10(3) UL (ref 0–0.7)
EOSINOPHIL NFR BLD AUTO: 0.3 %
ERYTHROCYTE [DISTWIDTH] IN BLOOD BY AUTOMATED COUNT: 12.5 % (ref 11–15)
GLOBULIN PLAS-MCNC: 2.8 G/DL (ref 2–3.5)
GLUCOSE BLD-MCNC: 109 MG/DL (ref 70–99)
GLUCOSE UR-MCNC: NORMAL MG/DL
HCT VFR BLD AUTO: 40.1 %
HGB BLD-MCNC: 13.5 G/DL
IMM GRANULOCYTES # BLD AUTO: 0.07 X10(3) UL (ref 0–1)
IMM GRANULOCYTES NFR BLD: 0.5 %
KETONES UR-MCNC: NEGATIVE MG/DL
LEUKOCYTE ESTERASE UR QL STRIP.AUTO: NEGATIVE
LYMPHOCYTES # BLD AUTO: 1.5 X10(3) UL (ref 1–4)
LYMPHOCYTES NFR BLD AUTO: 10.4 %
MCH RBC QN AUTO: 31.5 PG (ref 26–34)
MCHC RBC AUTO-ENTMCNC: 33.7 G/DL (ref 31–37)
MCV RBC AUTO: 93.7 FL
MONOCYTES # BLD AUTO: 1.65 X10(3) UL (ref 0.1–1)
MONOCYTES NFR BLD AUTO: 11.5 %
NEUTROPHILS # BLD AUTO: 11.08 X10 (3) UL (ref 1.5–7.7)
NEUTROPHILS # BLD AUTO: 11.08 X10(3) UL (ref 1.5–7.7)
NEUTROPHILS NFR BLD AUTO: 77 %
NITRITE UR QL STRIP.AUTO: NEGATIVE
OSMOLALITY SERPL CALC.SUM OF ELEC: 286 MOSM/KG (ref 275–295)
PH UR: 5.5 [PH] (ref 5–8)
PLATELET # BLD AUTO: 248 10(3)UL (ref 150–450)
POTASSIUM SERPL-SCNC: 3.8 MMOL/L (ref 3.5–5.1)
PROT SERPL-MCNC: 7.5 G/DL (ref 5.7–8.2)
PROT UR-MCNC: 100 MG/DL
RBC # BLD AUTO: 4.28 X10(6)UL
SODIUM SERPL-SCNC: 138 MMOL/L (ref 136–145)
SP GR UR STRIP: 1.03 (ref 1–1.03)
UROBILINOGEN UR STRIP-ACNC: 2
WBC # BLD AUTO: 14.4 X10(3) UL (ref 4–11)

## 2024-10-31 PROCEDURE — 99284 EMERGENCY DEPT VISIT MOD MDM: CPT

## 2024-10-31 PROCEDURE — 74177 CT ABD & PELVIS W/CONTRAST: CPT | Performed by: EMERGENCY MEDICINE

## 2024-10-31 PROCEDURE — 96375 TX/PRO/DX INJ NEW DRUG ADDON: CPT

## 2024-10-31 PROCEDURE — 99285 EMERGENCY DEPT VISIT HI MDM: CPT

## 2024-10-31 PROCEDURE — 81001 URINALYSIS AUTO W/SCOPE: CPT | Performed by: EMERGENCY MEDICINE

## 2024-10-31 PROCEDURE — 96374 THER/PROPH/DIAG INJ IV PUSH: CPT

## 2024-10-31 PROCEDURE — 80053 COMPREHEN METABOLIC PANEL: CPT | Performed by: EMERGENCY MEDICINE

## 2024-10-31 PROCEDURE — 85025 COMPLETE CBC W/AUTO DIFF WBC: CPT | Performed by: EMERGENCY MEDICINE

## 2024-10-31 RX ORDER — KETOROLAC TROMETHAMINE 15 MG/ML
15 INJECTION, SOLUTION INTRAMUSCULAR; INTRAVENOUS ONCE
Status: COMPLETED | OUTPATIENT
Start: 2024-10-31 | End: 2024-10-31

## 2024-10-31 RX ORDER — MORPHINE SULFATE 4 MG/ML
4 INJECTION, SOLUTION INTRAMUSCULAR; INTRAVENOUS ONCE
Status: COMPLETED | OUTPATIENT
Start: 2024-10-31 | End: 2024-10-31

## 2024-10-31 RX ORDER — CIPROFLOXACIN 500 MG/1
500 TABLET, FILM COATED ORAL 2 TIMES DAILY
Qty: 10 TABLET | Refills: 0 | Status: SHIPPED | OUTPATIENT
Start: 2024-10-31 | End: 2024-11-05

## 2024-10-31 RX ORDER — METRONIDAZOLE 500 MG/1
500 TABLET ORAL 3 TIMES DAILY
Qty: 15 TABLET | Refills: 0 | Status: SHIPPED | OUTPATIENT
Start: 2024-10-31 | End: 2024-11-05

## 2024-10-31 RX ORDER — ONDANSETRON 2 MG/ML
4 INJECTION INTRAMUSCULAR; INTRAVENOUS ONCE
Status: COMPLETED | OUTPATIENT
Start: 2024-10-31 | End: 2024-10-31

## 2024-10-31 RX ORDER — CIPROFLOXACIN 500 MG/1
500 TABLET, FILM COATED ORAL ONCE
Status: COMPLETED | OUTPATIENT
Start: 2024-10-31 | End: 2024-10-31

## 2024-10-31 RX ORDER — METRONIDAZOLE 500 MG/1
500 TABLET ORAL ONCE
Status: COMPLETED | OUTPATIENT
Start: 2024-10-31 | End: 2024-10-31

## 2024-10-31 NOTE — ED PROVIDER NOTES
Patient Seen in: [facility] Emergency Department      History     Chief Complaint   Patient presents with    Abdomen/Flank Pain     Stated Complaint: Abdomen/Flank pain    Subjective:   HPI          Objective:     Past Medical History:    Actinic keratosis    Allergic rhinitis    Anesthesia complication    blood pressure bottomed out - 6-8 hrs after surgery. Revived on own when staff moved pt.    Anxiety    Anxiety state    Cancer (HCC)    Breast Cancer DX     Chronic rhinitis    Cough variant asthma (HCC)    Depression    Dyslipidemia    Environmental allergies    Esophageal reflux    History of neuroma    \"Neuroma 3 rt\"    HTN (hypertension), benign    Left ventricular systolic dysfunction (LVSD)    Melanoma in situ (HCC)    Left chest    Metatarsalgia    \"modified insole / rx naprosyn\"    LAURY on CPAP    Osteoarthritis    PONV (postoperative nausea and vomiting)    Right knee injury    Arthroscopy    Sleep apnea    Visual impairment    glasses/contacts,dry eye              Past Surgical History:   Procedure Laterality Date          x 4    Colonoscopy  2013    per NG    Colonoscopy      2013    Hysteroscopy  2017    myosure polypectomy    Knee arthroscopy Right     x3 last     [facility] localization wire 1 site left (cpt=19281)      Maximus localization wire 1 site right (cpt=19281)      Other surgical history       arthroscopic surgery 3 times right knee meniscal tear    Radiation Bilateral 04/15/2019    recent radiation tx for breast cancer    Reduction left  2014    Reduction of large breast Bilateral 2014    Reduction right  2014                Social History     Socioeconomic History    Marital status:    Tobacco Use    Smoking status: Former     Current packs/day: 0.00     Types: Cigarettes     Quit date: 1986     Years since quittin.3     Passive exposure: Past    Smokeless tobacco: Never    Tobacco comments:     I have not smoked in over 35 years   Vaping  Use    Vaping status: Never Used   Substance and Sexual Activity    Alcohol use: Yes     Alcohol/week: 2.0 standard drinks of alcohol     Types: 2 Cans of beer per week     Comment: minimal consumption    Drug use: No   Other Topics Concern    Caffeine Concern Yes     Comment: Coffee, 1 cups daily; Tea;     Exercise Yes     Comment: walking, yoga    Grew up on a farm No    History of tanning No    Outdoor occupation No    Pt has a pacemaker No    Pt has a defibrillator No    Breast feeding No    Reaction to local anesthetic No    Left Handed No    Right Handed Yes    Currently spends a great deal of time in the sun No    Hx of Spending Great Deal of Time in Sun No    Bad sunburns in the past No    Tanning Salons in the Past No    Hx of Radiation Treatments Yes   Social History Narrative    The patient does not use an assistive device..      The patient does live in a home with stairs.     Social Drivers of Health      [facility]    Housing Stability                  Physical Exam     ED Triage Vitals [10/31/24 0709]   /72   Pulse 100   Resp 20   Temp 96.5 °F (35.8 °C)   Temp src Temporal   SpO2 94 %   O2 Device None (Room air)       Current Vitals:   Vital Signs  BP: 109/53  Pulse: 74  Resp: 14  Temp: 96.5 °F (35.8 °C)  Temp src: Temporal    Oxygen Therapy  SpO2: 99 %  O2 Device: None (Room air)        Physical Exam        ED Course     Labs Reviewed   URINALYSIS WITH CULTURE REFLEX - Abnormal; Notable for the following components:       Result Value    Clarity Urine Ex.Turbid (*)     Blood Urine 2+ (*)     Protein Urine 100 (*)     Urobilinogen Urine 2 (*)     RBC Urine 3-5 (*)     Bacteria Urine 1+ (*)     Squamous Epi. Cells Many (*)     All other components within normal limits   CBC WITH DIFFERENTIAL WITH PLATELET - Abnormal; Notable for the following components:    WBC 14.4 (*)     Neutrophil Absolute Prelim 11.08 (*)     Neutrophil  Absolute 11.08 (*)     Monocyte Absolute 1.65 (*)     All other components within normal limits   COMP METABOLIC PANEL (14) - Abnormal; Notable for the following components:    Glucose 109 (*)     Creatinine 1.03 (*)     BUN/CREA Ratio 9.7 (*)     Bilirubin, Total 1.6 (*)     All other components within normal limits                   MDM      64-year-old female with history of hypertension and breast cancer in remission presents today with lower abdominal and low back pain.  Patient states she has been having pain increasing over the last 3 days and associated with chills at home.  Denies any specific injury or strain.  She may have had some mild intermittent dysuria.  Denies fevers, nausea/vomiting, diarrhea, melena, constipation, or other new symptoms.  She does note she had an episode of vaginal bleeding 2 weeks ago that has since resolved for which she is waiting for PMD follow-up.    On exam, vitals normal, well-appearing, abdomen soft and nondistended but with tenderness to palpation across the lower abdomen with some guarding.  No CVA tenderness.  No midline spine tenderness.    Differential: Cystitis, diverticulitis, uterine/ovarian pathology,    Recent history of vaginal bleeding raises some concern for possible infectious or neoplastic process for which the patient needs to follow-up with her OB/GYN.    Will plan to eval with labs, UA, and CT abdomen pelvis while treating pain.    Labs show a leukocytosis at 14.4.  Urinalysis contaminated but showing hematuria without pyuria, nitrates, or leuk esterase.    CT abdomen pelvis shows uncomplicated diverticulitis.    On reexamination, patient hemodynamically normal and feeling better.    Discharged with prescription for Cipro and Flagyl with instructions on PMD and gastroenterology follow-up with return precautions.    Medical Decision Making      Disposition and Plan     Clinical Impression:  1. Acute diverticulitis         Disposition:  Discharge  10/31/2024   9:17 am    Follow-up:  Rosalina Jeronimo MD  [address] [facility] [address]  [phone]    Schedule an appointment as soon as possible for a visit  As needed          Medications Prescribed:  Current Discharge Medication List        START taking these medications    Details   ciprofloxacin 500 MG Oral Tab Take 1 tablet (500 mg total) by mouth 2 (two) times daily for 5 days.  Qty: 10 tablet, Refills: 0      metRONIDAZOLE 500 MG Oral Tab Take 1 tablet (500 mg total) by mouth 3 (three) times daily for 5 days.  Qty: 15 tablet, Refills: 0                 Supplementary Documentation:

## 2024-10-31 NOTE — ED INITIAL ASSESSMENT (HPI)
Pt states lower back pain and lower abd/pelvic pain x 3 days. Also states having chills. Denies n/v.

## 2024-11-01 ENCOUNTER — OFFICE VISIT (OUTPATIENT)
Dept: INTERNAL MEDICINE CLINIC | Facility: CLINIC | Age: 64
End: 2024-11-01
Payer: MEDICARE

## 2024-11-01 ENCOUNTER — TELEPHONE (OUTPATIENT)
Dept: INTERNAL MEDICINE CLINIC | Facility: CLINIC | Age: 64
End: 2024-11-01

## 2024-11-01 VITALS
HEIGHT: 68 IN | HEART RATE: 102 BPM | TEMPERATURE: 99 F | SYSTOLIC BLOOD PRESSURE: 123 MMHG | WEIGHT: 175 LBS | DIASTOLIC BLOOD PRESSURE: 78 MMHG | BODY MASS INDEX: 26.52 KG/M2

## 2024-11-01 DIAGNOSIS — K57.92 ACUTE DIVERTICULITIS: Primary | ICD-10-CM

## 2024-11-01 PROCEDURE — 99213 OFFICE O/P EST LOW 20 MIN: CPT | Performed by: INTERNAL MEDICINE

## 2024-11-01 RX ORDER — ACETAMINOPHEN AND CODEINE PHOSPHATE 300; 30 MG/1; MG/1
1 TABLET ORAL EVERY 8 HOURS PRN
Qty: 15 TABLET | Refills: 0 | Status: SHIPPED | OUTPATIENT
Start: 2024-11-01

## 2024-11-01 NOTE — TELEPHONE ENCOUNTER
ER Follow Up appointment Request   With Dr Watson only.  She declined an appointment with Dr Cristhian harmon for 11-2.    Patient was at the ER yesterday/[facility]/diagnosed with diverticulities/ states she is in miserable pain, said they gave her nothing for pain/she wants pain meds.    Patient wants an appointment today with Dr Watson. Call her at:  [phone]     Please reply to pool: EM CC IM FM ALG RHE [[id number]]

## 2024-11-01 NOTE — PROGRESS NOTES
Subjective:     Patient ID: Mirta Miller is a 64 year old female.     Patient presents today for post ER ffup. She was seen at [facility] ER yesterday with complaint of LLQ area; ct abd done showed sigmoid diverticulitis, w/o perforation or abscess.  She was discharge on cipro and flagyl.  Pt denies having fevers. She states continues to have pain as she had yesterday and didn't get pain med from ER. She had been taking tylenol not controlling her pain.         History/Other:   Review of Systems   Constitutional: Negative.    Respiratory: Negative.     Cardiovascular: Negative.    Gastrointestinal:  Positive for abdominal pain. Negative for anal bleeding, blood in stool, nausea and vomiting.   Genitourinary: Negative.      Current Outpatient Medications   Medication Sig Dispense Refill    ciprofloxacin 500 MG Oral Tab Take 1 tablet (500 mg total) by mouth 2 (two) times daily for 5 days. 10 tablet 0    metRONIDAZOLE 500 MG Oral Tab Take 1 tablet (500 mg total) by mouth 3 (three) times daily for 5 days. 15 tablet 0    clobetasol 0.05 % External Ointment Apply a pea size amount to affected area twice daily until symptoms resolve 15 g 0    ALPRAZolam 0.5 MG Oral Tab Take 1 tablet (0.5 mg total) by mouth 2 (two) times daily as needed for Anxiety. 60 tablet 0    losartan 50 MG Oral Tab Take 1 tablet (50 mg total) by mouth daily. 90 tablet 3    rosuvastatin 10 MG Oral Tab rosuvastatin 10 mg tablet, [RxNorm: 017737]      pantoprazole 40 MG Oral Tab EC Take 1 tablet (40 mg total) by mouth daily.      metoprolol succinate  MG Oral Tablet 24 Hr Take 1 tablet (100 mg total) by mouth daily.      Azelastine HCl 137 MCG/SPRAY Nasal Solution 2 sprays by Nasal route 2 (two) times daily. 30 mL 5    Probiotic Product (ALIGN OR) Take by mouth.       Allergies:Allergies[1]    Past Medical History:    Actinic keratosis    Allergic rhinitis    Anesthesia complication    blood pressure bottomed out - 6-8 hrs after surgery. Revived on  own when staff moved pt.    Anxiety    Anxiety state    Cancer (HCC)    Breast Cancer DX     Chronic rhinitis    Cough variant asthma (HCC)    Depression    Dyslipidemia    Environmental allergies    Esophageal reflux    History of neuroma    \"Neuroma 3 rt\"    HTN (hypertension), benign    Left ventricular systolic dysfunction (LVSD)    Melanoma in situ (HCC)    Left chest    Metatarsalgia    \"modified insole / rx naprosyn\"    LAURY on CPAP    Osteoarthritis    PONV (postoperative nausea and vomiting)    Right knee injury    Arthroscopy    Sleep apnea    Visual impairment    glasses/contacts,dry eye      Past Surgical History:   Procedure Laterality Date          x 4    Colonoscopy  2013    per NG    Colonoscopy      2013    Hysteroscopy  2017    myosure polypectomy    Knee arthroscopy Right     x3 last     Maximus localization wire 1 site left (cpt=19281)      Maximus localization wire 1 site right (cpt=19281)      Other surgical history       arthroscopic surgery 3 times right knee meniscal tear    Radiation Bilateral 04/15/2019    recent radiation tx for breast cancer    Reduction left  2014    Reduction of large breast Bilateral 2014    Reduction right  2014      Family History   Adopted: Yes   Family history unknown: Yes      Social History:   Social History     Socioeconomic History    Marital status:    Tobacco Use    Smoking status: Former     Current packs/day: 0.00     Types: Cigarettes     Quit date: 1986     Years since quittin.3     Passive exposure: Past    Smokeless tobacco: Never    Tobacco comments:     I have not smoked in over 35 years   Vaping Use    Vaping status: Never Used   Substance and Sexual Activity    Alcohol use: Yes     Alcohol/week: 2.0 standard drinks of alcohol     Types: 2 Cans of beer per week     Comment: minimal consumption    Drug use: No   Other Topics Concern    Caffeine Concern Yes     Comment: Coffee, 1 cups daily; Tea;      Exercise Yes     Comment: walking, yoga    Grew up on a farm No    History of tanning No    Outdoor occupation No    Pt has a pacemaker No    Pt has a defibrillator No    Breast feeding No    Reaction to local anesthetic No    Left Handed No    Right Handed Yes    Currently spends a great deal of time in the sun No    Hx of Spending Great Deal of Time in Sun No    Bad sunburns in the past No    Tanning Salons in the Past No    Hx of Radiation Treatments Yes   Social History Narrative    The patient does not use an assistive device..      The patient does live in a home with stairs.     Social Drivers of Health      Received from [facility], [facility]    Housing Stability        Objective:   Physical Exam  Constitutional:       General: She is not in acute distress.     Appearance: She is well-developed. She is not ill-appearing, toxic-appearing or diaphoretic.   HENT:      Head: Normocephalic and atraumatic.      Right Ear: External ear normal.      Left Ear: External ear normal.      Nose: Nose normal.      Mouth/Throat:      Pharynx: No oropharyngeal exudate.   Eyes:      General:         Right eye: No discharge.         Left eye: No discharge.      Conjunctiva/sclera: Conjunctivae normal.      Pupils: Pupils are equal, round, and reactive to light.   Neck:      Vascular: No JVD.   Cardiovascular:      Rate and Rhythm: Normal rate and regular rhythm.      Heart sounds: Normal heart sounds. No murmur heard.  Pulmonary:      Effort: Pulmonary effort is normal. No respiratory distress.      Breath sounds: Normal breath sounds. No wheezing or rales.   Abdominal:      General: Bowel sounds are normal. There is no distension.      Palpations: Abdomen is soft. There is no mass.      Tenderness: There is abdominal tenderness in the left lower quadrant. There is no guarding or rebound. Negative signs include Godinez's sign and McBurney's sign.      Hernia: There is no hernia in the  umbilical area.   Musculoskeletal:         General: No tenderness. Normal range of motion.      Cervical back: Normal range of motion and neck supple. No rigidity or tenderness.      Right lower leg: No edema.      Left lower leg: No edema.   Lymphadenopathy:      Cervical: No cervical adenopathy.   Skin:     General: Skin is warm and dry.      Findings: No rash.   Neurological:      Mental Status: She is alert and oriented to person, place, and time.         Assessment & Plan:   (K57.92) Acute diverticulitis  (primary encounter diagnosis)  Plan: Gastro Referral - In Network,         acetaminophen-codeine 300-30 MG Oral Tab        Pt on cipro/flagyl given in ER; pt to ff low fiber diet for 2 weeks and then switch to high fiber diet after.   Pt also told she needs to see Gastro and get colonscopy after 6 to 8 weeks to make sure no malignant lesion.  Referral given. Pt agreed.   Pt also needed pain med for her acute diverticulitis, pt given tylenol with codeine.    Pt to call back or ER if any worsening or persist.        No orders of the defined types were placed in this encounter.      Meds This Visit:  Requested Prescriptions      No prescriptions requested or ordered in this encounter       Imaging & Referrals:  None            [1]   Allergies  Allergen Reactions    Banana HIVES    Cantaloupe HIVES    Cephalosporins HIVES    Sulfamethoxazole HIVES    Trimethoprim HIVES    Milk-Related Compounds PAIN     Stomach pain, diarrhea    Dust Mite Extract ITCHING    Penicillins HIVES and RASH

## 2024-12-10 ENCOUNTER — OFFICE VISIT (OUTPATIENT)
Facility: CLINIC | Age: 64
End: 2024-12-10
Payer: MEDICARE

## 2024-12-10 ENCOUNTER — PATIENT MESSAGE (OUTPATIENT)
Dept: ENDOCRINOLOGY CLINIC | Facility: CLINIC | Age: 64
End: 2024-12-10

## 2024-12-10 ENCOUNTER — TELEPHONE (OUTPATIENT)
Facility: CLINIC | Age: 64
End: 2024-12-10

## 2024-12-10 VITALS
HEART RATE: 72 BPM | HEIGHT: 68 IN | DIASTOLIC BLOOD PRESSURE: 80 MMHG | SYSTOLIC BLOOD PRESSURE: 125 MMHG | WEIGHT: 188 LBS | BODY MASS INDEX: 28.49 KG/M2

## 2024-12-10 DIAGNOSIS — Z87.19 HISTORY OF DIVERTICULITIS: Primary | ICD-10-CM

## 2024-12-10 DIAGNOSIS — K21.9 GASTROESOPHAGEAL REFLUX DISEASE, UNSPECIFIED WHETHER ESOPHAGITIS PRESENT: ICD-10-CM

## 2024-12-10 PROCEDURE — 99214 OFFICE O/P EST MOD 30 MIN: CPT | Performed by: NURSE PRACTITIONER

## 2024-12-10 NOTE — TELEPHONE ENCOUNTER
Scheduled for:  Colonoscopy [id number]  Provider Name:  Dr. Yung  Date:  1/22/2025  Location:[facility]  Sedation:  MAC  Time: (pt is aware that CF will call the day before to confirm arrival time)    Prep:  Golytely  Meds/Allergies Reconciled?:    ANGEL Sharpe  Diagnosis with codes:   History of Diverticulitis  Z87.19  Was patient informed to call insurance with codes (Y/N):  Yes  Referral sent?:  Referral was sent at the time of electronic surgical scheduling.  EM or [facility] notified?:  I sent an electronic request to Endo Scheduling and received a confirmation today.  Medication Orders:  Pt is aware to NOT take iron pills, herbal meds and diet supplements for 7 days before exam. Also to NOT take any form of alcohol, recreational drugs and any forms of ED meds 24 hours before exam.   Misc Orders:       Further instructions given by staff:  I provide prep instructions to patient at the time of the appointment and reviewed date, time and location, she verbalized that she understood and is aware to call if she has any questions.

## 2024-12-10 NOTE — H&P
[facility] - Gastroenterology                                                                                                               Reason for consult: ER follow up    Requesting physician or provider: Elliot Watson MD    Chief Complaint   Patient presents with    Consult     From ER follow up       HPI:   Mirta Miller is a 64 year old year-old female with medical history of asthma, hypertension, left breast cancer, depression, LAURY uses mouth appliance.     she is here today for ER follow up.   Was seen at [facility] on 10/31/24 for abdominal pain and chills. CT showing sigmoid diverticulitis. Was first episode.  Treated with Cipro and Flagyl. Symptoms resolved completely. Had an emergency with special needs son just a day prior.   Had colonscopy scheduled 1/2024 but ended up cancelling due to negative cologuard.    This past weekend had some similar pain and cramping after eating some Walnuts but symptoms ended up resolving on their own within 2 days.     Patient denies symptoms of nausea, vomiting, dyspepsia, dysphagia, odynophagia, globus sensation, heartburn, hematemesis, abdominal pain, change in bowel habits, constipation, diarrhea, hematochezia, or melena. she denies recent change in appetite, fever or unintentional weight loss.      Last colonoscopy: in her 50s, approximately 10 years ago   Last EGD: none    NSAIDS: none   Tobacco: former, 40 years ago   Alcohol: social   Marijuana: none   Illicit drugs: none     FH GI malignancy: adopted, unsure of history       No history of adverse reaction to sedation  +LAURY, uses a mouth appliance for sleep apnea   No anticoagulants/antiplatelet  No pacemaker/defibrillator    Wt Readings from Last 6 Encounters:   12/10/24 188 lb (85.3 kg)   11/01/24 175 lb (79.4 kg)   10/31/24 175 lb (79.4 kg)   10/22/24 181 lb (82.1 kg)   10/18/24 184 lb 9.6 oz (83.7 kg)    24 183 lb 9.6 oz (83.3 kg)        History, Medications, Allergies, ROS:      Past Medical History:    Actinic keratosis    Allergic rhinitis    Anesthesia complication    blood pressure bottomed out - 6-8 hrs after surgery. Revived on own when staff moved pt.    Anxiety    Anxiety state    Cancer (HCC)    Breast Cancer DX     Chronic rhinitis    Cough variant asthma (HCC)    Depression    Dyslipidemia    Environmental allergies    Esophageal reflux    History of neuroma    \"Neuroma 3 rt\"    HTN (hypertension), benign    Left ventricular systolic dysfunction (LVSD)    Melanoma in situ (HCC)    Left chest    Metatarsalgia    \"modified insole / rx naprosyn\"    LAURY on CPAP    Osteoarthritis    PONV (postoperative nausea and vomiting)    Right knee injury    Arthroscopy    Sleep apnea    Visual impairment    glasses/contacts,dry eye      Past Surgical History:   Procedure Laterality Date          x 4    Colonoscopy  2013    per NG    Colonoscopy      2013    Hysteroscopy  2017    myosure polypectomy    Knee arthroscopy Right     x3 last     Maximus localization wire 1 site left (cpt=19281)      Maximus localization wire 1 site right (cpt=19281)      Other surgical history       arthroscopic surgery 3 times right knee meniscal tear    Radiation Bilateral 04/15/2019    recent radiation tx for breast cancer    Reduction left  2014    Reduction of large breast Bilateral 2014    Reduction right  2014      Family Hx:   Family History   Adopted: Yes   Family history unknown: Yes      Social History:   Social History     Socioeconomic History    Marital status:    Tobacco Use    Smoking status: Former     Current packs/day: 0.00     Types: Cigarettes     Quit date: 1986     Years since quittin.4     Passive exposure: Past    Smokeless tobacco: Never    Tobacco comments:     I have not smoked in over 35 years   Vaping Use    Vaping status: Never Used   Substance and Sexual  Activity    Alcohol use: Yes     Alcohol/week: 2.0 standard drinks of alcohol     Types: 2 Cans of beer per week     Comment: minimal consumption    Drug use: No   Other Topics Concern    Caffeine Concern Yes     Comment: Coffee, 1 cups daily; Tea;     Exercise Yes     Comment: walking, yoga    Grew up on a farm No    History of tanning No    Outdoor occupation No    Pt has a pacemaker No    Pt has a defibrillator No    Breast feeding No    Reaction to local anesthetic No    Left Handed No    Right Handed Yes    Currently spends a great deal of time in the sun No    Hx of Spending Great Deal of Time in Sun No    Bad sunburns in the past No    Tanning Salons in the Past No    Hx of Radiation Treatments Yes   Social History Narrative    The patient does not use an assistive device..      The patient does live in a home with stairs.     Social Drivers of Health      Received from [facility], [facility]    Housing Stability        Medications (Active prior to today's visit):  Current Outpatient Medications   Medication Sig Dispense Refill    polyethylene glycol, PEG 3350-KCl-NaBcb-NaCl-NaSulf, 236 g Oral Recon Soln Take 4,000 mL by mouth As Directed. Take 2,000 mL the night before your procedure and 2,000 mL the morning of your procedure. 1 each 0    acetaminophen-codeine 300-30 MG Oral Tab Take 1 tablet by mouth every 8 (eight) hours as needed for Pain. 15 tablet 0    clobetasol 0.05 % External Ointment Apply a pea size amount to affected area twice daily until symptoms resolve 15 g 0    ALPRAZolam 0.5 MG Oral Tab Take 1 tablet (0.5 mg total) by mouth 2 (two) times daily as needed for Anxiety. 60 tablet 0    losartan 50 MG Oral Tab Take 1 tablet (50 mg total) by mouth daily. 90 tablet 3    rosuvastatin 10 MG Oral Tab rosuvastatin 10 mg tablet, [RxNorm: 515882]      pantoprazole 40 MG Oral Tab EC Take 1 tablet (40 mg total) by mouth daily.      metoprolol succinate  MG Oral  Tablet 24 Hr Take 1 tablet (100 mg total) by mouth daily.      Azelastine HCl 137 MCG/SPRAY Nasal Solution 2 sprays by Nasal route 2 (two) times daily. 30 mL 5    Probiotic Product (ALIGN OR) Take by mouth.         Allergies:  Allergies[1]    ROS:   CONSTITUTIONAL: negative for fevers, chills, sweats  EYES Negative for scleral icterus or redness, and diplopia  HEENT: Negative for hoarseness  RESPIRATORY: Negative for cough and severe shortness of breath  CARDIOVASCULAR: Negative for crushing sub-sternal chest pain  GASTROINTESTINAL: See HPI  GENITOURINARY: Negative for dysuria  MUSCULOSKELETAL: Negative for arthralgias and myalgias  SKIN: Negative for jaundice, rash or pruritus  NEUROLOGICAL: Negative for dizziness and headaches  BEHAVIOR/PSYCH: Negative for psychotic behavior    PHYSICAL EXAM:   Blood pressure 125/80, pulse 72, height 5' 8\" (1.727 m), weight 188 lb (85.3 kg), not currently breastfeeding.    GEN: Alert, no acute distress, well-nourished   HEENT: anicteric sclera, neck supple, trachea midline, MMM, no palpable or tender neck or supraclavicular lymph nodes  CV: RRR, the extremities are warm and well perfused   LUNGS: No increased work of breathing, CTAB  ABDOMEN: Soft, symmetrical, non-tender without distention or guarding. No scars or lesions. Aorta is without bruit or visible pulsation. Umbilicus is midline without herniation. Normoactive bowel sounds are present, No masses, hepatomegaly or splenomegaly noted.  MSK: No erythema, no warmth, no swelling of joints  SKIN: No jaundice, no erythema, no rashes, no lesions  HEMATOLOGIC: No bleeding, no bruising  NEURO: Alert and interactive, DESAI  PSYCH: appropriate mood & affect    Labs/Imaging/Procedures:     PROCEDURE: CT ABDOMEN + PELVIS (CONTRAST ONLY) (CPT=74177)     COMPARISON: CT UROGRAM(W+WO) W/3D (CPT=74178/79288), 7/06/2021, 8:42 AM.  [facility], CT ABDOMEN + PELVIS KIDNEYSTONE 2D RNDR (NO IV NO ORAL) (CPT=741,  5/15/2021, 3:02 PM.  [facility], CT PF RENAL STONE  ABD/P WO CON, 3/26/2015, 10:51 AM.     INDICATIONS: Lower back and lower abdominal pain.     TECHNIQUE: Multidetector CT images of the abdomen and pelvis were obtained with non-ionic intravenous contrast material. Automated exposure control for dose reduction was used. Adjustment of the mA and/or kV was done based on the patient's size.  Iterative reconstruction technique for dose reduction was employed. Dose information was transmitted to the ACR (American College of Radiology) NRDR (National Radiology Data Registry), which includes the Dose Index Registry.  No oral contrast was  ingested.     FINDINGS:  LUNG BASES: The heart is normal in size.  The coronary arteries are suboptimally assessed on this examination.  Postoperative changes are seen in the inner aspect of the left breast.  There are benign coarse calcifications in the right breast.  There is  no visible pulmonary or pleural disease.  LIVER: There is focal fatty infiltration along the falciform ligament.  BILIARY: The gallbladder is nondilated.  The biliary tree is normal in caliber.  PANCREAS: No lesion, fluid collection, ductal dilatation, or atrophy.    SPLEEN: No enlargement.    ADRENALS:   No defined mass or abnormal enlargement.    KIDNEYS:   Symmetric enhancement is seen without evidence of hydronephrosis or underlying solid masses.  GI/MESENTERY: Evaluation of the gastrointestinal tract is limited without enteric contrast.  There is no bowel obstruction.  The appendix is normal.  There is distal colonic diverticulosis.  There has been development of circumferential bowel wall  thickening and surrounding fat stranding involving the distal sigmoid colon.  There is no drainable pericolonic fluid collection.  URINARY BLADDER: No visible calculus or focal wall thickening.    PELVIC NODES: No lymphadenopathy.    PELVIC ORGANS: No visible mass. Pelvic organs appropriate for patient age.     VASCULATURE:   Curvilinear calcification in the left renal hilum that measures approximately 5 mm is grossly unchanged and is again suspicious for a small distal left renal artery aneurysm (series 5, image 63).  There is minimal calcific atherosclerosis.  RETROPERITONEUM: No mass or lymphadenopathy is apparent.    BONES:   No significant bony lesion or fracture.  There are stable mild spondylotic changes in the lumbar spine.  ABDOMINAL WALL: There is a stable small fat containing umbilical hernia without bowel loop involvement.  OTHER: No free air or fluid is seen in the abdomen or pelvis.                 Impression   CONCLUSION:  1. Acute diverticulitis involving the distal sigmoid colon.  No drainable diverticular abscess or free peritoneal air.  2. Stable small fat containing umbilical hernia.  3. Suspected subcentimeter peripherally calcified distal left renal artery aneurysm, grossly unchanged from July 2021.        .  ASSESSMENT/PLAN:   Mirta Miller is a 64 year old year-old female with medical history of asthma, hypertension, left breast cancer, depression, LAURY uses mouth appliance.     1. History of diverticulitis  Symptoms have improved. If pain returns will contact again for imaging order. Continue avoiding dietary triggers. Will schedule colonoscopy today.    2. Gastroesophageal reflux disease, unspecified whether esophagitis present  Feels symptoms are stable on Pantoprazole.        Patient Instructions   1. Schedule colonoscopy with Urgent  Pool Endoscopist  Diagnosis: history of diverticulitis   Sedation: MAC  Prep: split dose golytely    2.  bowel prep from pharmacy   You can pick the bowel prep up now and store in a cool, dry place in your home until your scheduled bowel prep start date.    3. Continue all medications as normal for your procedure. DO NOT TAKE: Any form of alcohol, recreational drugs and any forms of erectile dysfunction medications 24 hours prior to procedure.    4.  Read all bowel prep instructions carefully. Bowel prep instructions can also be found online at:  [email or web address]     5. AVOID seeds, nuts, popcorn, raw fruits and vegetables for 5 days before procedure    6. If you start any NEW medication after your visit today, please notify us. Certain medications (like iron or weight loss medications) will need to be held before the procedure, or the procedure cannot be performed safely.             Orders This Visit:  No orders of the defined types were placed in this encounter.      Meds This Visit:  Requested Prescriptions     Signed Prescriptions Disp Refills    polyethylene glycol, PEG 3350-KCl-NaBcb-NaCl-NaSulf, 236 g Oral Recon Soln 1 each 0     Sig: Take 4,000 mL by mouth As Directed. Take 2,000 mL the night before your procedure and 2,000 mL the morning of your procedure.       Imaging & Referrals:  None      ANGEL Sharpe    [facility]  12/10/2024               [1]   Allergies  Allergen Reactions    Banana HIVES    Cantaloupe HIVES    Cephalosporins HIVES    Sulfamethoxazole HIVES    Trimethoprim HIVES    Milk-Related Compounds PAIN     Stomach pain, diarrhea    Dust Mite Extract ITCHING    Penicillins HIVES and RASH

## 2024-12-10 NOTE — PATIENT INSTRUCTIONS
1. Schedule colonoscopy with Urgent  Pool Endoscopist  Diagnosis: history of diverticulitis   Sedation: MAC  Prep: split dose golytely    2.  bowel prep from pharmacy   You can pick the bowel prep up now and store in a cool, dry place in your home until your scheduled bowel prep start date.    3. Continue all medications as normal for your procedure. DO NOT TAKE: Any form of alcohol, recreational drugs and any forms of erectile dysfunction medications 24 hours prior to procedure.    4. Read all bowel prep instructions carefully. Bowel prep instructions can also be found online at:  [email or web address]/giprep     5. AVOID seeds, nuts, popcorn, raw fruits and vegetables for 5 days before procedure    6. If you start any NEW medication after your visit today, please notify us. Certain medications (like iron or weight loss medications) will need to be held before the procedure, or the procedure cannot be performed safely.

## 2025-01-02 ENCOUNTER — OFFICE VISIT (OUTPATIENT)
Dept: PULMONOLOGY | Facility: CLINIC | Age: 65
End: 2025-01-02
Payer: MEDICARE

## 2025-01-02 VITALS
DIASTOLIC BLOOD PRESSURE: 75 MMHG | BODY MASS INDEX: 29 KG/M2 | HEART RATE: 72 BPM | SYSTOLIC BLOOD PRESSURE: 117 MMHG | HEIGHT: 68 IN | OXYGEN SATURATION: 99 %

## 2025-01-02 DIAGNOSIS — J44.89 ASTHMA WITH COPD (CHRONIC OBSTRUCTIVE PULMONARY DISEASE) (HCC): Primary | Chronic | ICD-10-CM

## 2025-01-02 PROCEDURE — 99213 OFFICE O/P EST LOW 20 MIN: CPT | Performed by: INTERNAL MEDICINE

## 2025-01-02 NOTE — PROGRESS NOTES
The patient is a 64-year-old female who I know well from prior evaluation comes in now for follow-up.  Her dry cough is gone off the lisinopril.  The blood pressure is good today.    The severe sleep apnea is currently being managed with an oral appliance.  She had developed some TMJ so there was a slight setback but she is following with [facility] and will likely get home sleep test in the short-term.    Review of Systems:  Vision normal. Ear nose and throat normal. Bowel normal. Bladder function normal. No depression. No thyroid disease. No lymphatic system concerns.  No rash. Muscles and joints unremarkable. No weight loss no weight gain.    Physical Examination:  Vital signs normal. HEENT examination is unremarkable with pupils equal round and reactive to light and accommodation. Neck without adenopathy, thyromegaly, JVD nor bruit. Lungs clear to auscultation and percussion. Cardiac regular rate and rhythm no murmur. Abdomen nontender, without hepatosplenomegaly and no mass appreciable. Extremities and Musculoskeletal without clubbing cyanosis nor edema, and mobility acceptable. Neurologic grossly intact with symmetric tone and strength and reflex.    Assessment and plan:  1.  Severe LAURY-following with [facility] with oral appliance therapy.    Recommendations: Await repeat home sleep test with oral appliance, weight loss, avoid alcohol and sedating drug and never drive if sleepy.  See me at the 1 year interval or sooner if needed and contact me promptly if new trouble.    2.  Lisinopril associated cough-resolved.

## 2025-01-10 PROBLEM — I42.9 CARDIOMYOPATHY (HCC): Status: ACTIVE | Noted: 2022-11-14

## 2025-01-15 ENCOUNTER — LAB ENCOUNTER (OUTPATIENT)
Dept: LAB | Age: 65
End: 2025-01-15
Attending: INTERNAL MEDICINE
Payer: MEDICARE

## 2025-01-15 DIAGNOSIS — Z13.29 THYROID DISORDER SCREENING: ICD-10-CM

## 2025-01-15 LAB — TSI SER-ACNC: 2.1 UIU/ML (ref 0.55–4.78)

## 2025-01-15 PROCEDURE — 36415 COLL VENOUS BLD VENIPUNCTURE: CPT

## 2025-01-15 PROCEDURE — 84443 ASSAY THYROID STIM HORMONE: CPT

## 2025-01-16 DIAGNOSIS — F41.1 GAD (GENERALIZED ANXIETY DISORDER): ICD-10-CM

## 2025-01-17 ENCOUNTER — TELEPHONE (OUTPATIENT)
Facility: CLINIC | Age: 65
End: 2025-01-17

## 2025-01-17 DIAGNOSIS — K57.92 DIVERTICULITIS: Primary | ICD-10-CM

## 2025-01-17 NOTE — TELEPHONE ENCOUNTER
Patient received call from registration at [facility] and will be $300 out of pocket due to being ambulatory unit. Patient asking if Dr. Yung does the procedure at another location, or another physician. Please call at [phone],thanks.

## 2025-01-17 NOTE — TELEPHONE ENCOUNTER
Rescheduled for:  Colonoscopy [address]  Provider Name:  Dr. Yung  Date:  FROM 1/22/2025 TO 4/11/2025  Location:FROM [facility] TO [facility]  Sedation:  MAC  Time: FROM 12:15pm TO 10:40am (pt is aware that EM will call the day before to confirm arrival time)     Prep:  Golytely  Meds/Allergies Reconciled?:    ANGEL Sharpe  Diagnosis with codes:   History of Diverticulitis  Z87.19  Was patient informed to call insurance with codes (Y/N):  Yes  Referral sent?:  Referral was sent at the time of electronic surgical scheduling.  [facility] or [facility] notified?:  I sent an electronic request to Endo Scheduling and received a confirmation today.  Medication Orders:  Pt is aware to NOT take iron pills, herbal meds and diet supplements for 7 days before exam. Also to NOT take any form of alcohol, recreational drugs and any forms of ED meds 24 hours before exam.   Misc Orders:       Further instructions given by staff:  I provide prep instructions to patient at the time of the appointment and reviewed date, time and location, she verbalized that she understood and is aware to call if she has any questions.

## 2025-01-21 RX ORDER — ALPRAZOLAM 0.5 MG
0.5 TABLET ORAL 2 TIMES DAILY PRN
Qty: 60 TABLET | Refills: 0 | Status: SHIPPED | OUTPATIENT
Start: 2025-01-21

## 2025-01-21 NOTE — TELEPHONE ENCOUNTER
Please review. Protocol Failed; No Protocol      Recent fills: 8/6/2024, 10/18/2024  Last Rx written: 10/18/2024  Last office visit: 11/1/2024        Requested Prescriptions   Pending Prescriptions Disp Refills    ALPRAZolam 0.5 MG Oral Tab 60 tablet 0     Sig: Take 1 tablet (0.5 mg total) by mouth 2 (two) times daily as needed for Anxiety.       Controlled Substance Medication Failed - 1/21/2025 10:11 AM        Failed - This medication is a controlled substance - forward to provider to refill        Passed - Medication is active on med list               Future Appointments         Provider Department Appt Notes    In 2 weeks Floresita Plascencia MD [facility] 3 mon f/u    In 2 months SOLOMON FUNES [facility] CLN w/ MAC @ [facility]    In 3 months Massimo Biswas MD [facility]     In 11 months Sonido Gonzalez MD [facility] Follow up - sleep apnea          Recent Outpatient Visits              2 weeks ago Asthma with COPD (chronic obstructive pulmonary disease) (HCC)    [facility] Sonido Gonzalez MD    Office Visit    1 month ago History of diverticulitis    [facility] Isi Rahman APRN    Office Visit    2 months ago [facility]    [facility]    3 months ago Thyroid disorder screening    [facility] Yael Ryan MD    Office Visit    3 months ago Vulvar itching    [facility] - OB/GYN Julia Chiang MD    Office Visit

## 2025-01-28 ENCOUNTER — OFFICE VISIT (OUTPATIENT)
Dept: SURGERY | Facility: CLINIC | Age: 65
End: 2025-01-28
Payer: MEDICARE

## 2025-01-28 VITALS
SYSTOLIC BLOOD PRESSURE: 112 MMHG | OXYGEN SATURATION: 100 % | WEIGHT: 187 LBS | HEIGHT: 68 IN | HEART RATE: 86 BPM | DIASTOLIC BLOOD PRESSURE: 80 MMHG | BODY MASS INDEX: 28.34 KG/M2

## 2025-01-28 DIAGNOSIS — E78.5 DYSLIPIDEMIA: ICD-10-CM

## 2025-01-28 DIAGNOSIS — I10 HTN (HYPERTENSION), BENIGN: Primary | ICD-10-CM

## 2025-01-28 DIAGNOSIS — Z51.81 ENCOUNTER FOR THERAPEUTIC DRUG MONITORING: ICD-10-CM

## 2025-01-28 DIAGNOSIS — G47.33 OSA ON CPAP: ICD-10-CM

## 2025-01-28 DIAGNOSIS — E66.3 OVERWEIGHT (BMI 25.0-29.9): ICD-10-CM

## 2025-01-28 PROCEDURE — 99215 OFFICE O/P EST HI 40 MIN: CPT | Performed by: INTERNAL MEDICINE

## 2025-01-28 NOTE — PROGRESS NOTES
[facility], [facility], [place]  [address] [facility] [address] [place] [id number]  Dept: [phone]       Patient:  Mirta Miller  :      1960  MRN:      [id number]    Chief Complaint:    Chief Complaint   Patient presents with    Follow - Up    Weight Management       SUBJECTIVE     History of Present Illness:  Mirta Cueto is being seen today for a follow-up for non surgical weight loss.     Past Medical History:   Past Medical History:    Actinic keratosis    Allergic rhinitis    Anesthesia complication    blood pressure bottomed out - 6-8 hrs after surgery. Revived on own when staff moved pt. after breast reduction    Anxiety    Anxiety state    Cancer (HCC)    Breast Cancer DX     Chronic rhinitis    Cough variant asthma (HCC)    Depression    Diverticulitis    Dyslipidemia    Environmental allergies    Esophageal reflux    History of neuroma    \"Neuroma 3 rt\"    HTN (hypertension), benign    Left ventricular systolic dysfunction (LVSD)    Melanoma in situ (HCC)    Left chest    Metatarsalgia    \"modified insole / rx naprosyn\"    LAURY on CPAP    Osteoarthritis    PONV (postoperative nausea and vomiting)    Right knee injury    Arthroscopy    Sleep apnea    Visual impairment    glasses/contacts,dry eye        Comorbidities:  Back pain-Improvement?  yes, Joint pain-Improvement?  yes, KIMBERLEY-Improvement?  yes, and Snoring-Improvement?  yes    OBJECTIVE     Vitals: /80   Pulse 86   Ht 5' 8\" (1.727 m)   Wt 187 lb (84.8 kg)   LMP  (LMP Unknown)   SpO2 100%   BMI 28.43 kg/m²     Initial weight loss: +1   Total weight loss: -01   Start weight: 188    Wt Readings from Last 3 Encounters:   25 187 lb (84.8 kg)   01/10/25 175 lb (79.4 kg)   12/10/24 188 lb (85.3 kg)       Patient Medications:    Current Outpatient Medications   Medication Sig Dispense Refill    ALPRAZolam 0.5 MG Oral Tab Take 1 tablet (0.5 mg total) by mouth 2 (two) times daily as needed  for Anxiety. 60 tablet 0    polyethylene glycol, PEG 3350-KCl-NaBcb-NaCl-NaSulf, 236 g Oral Recon Soln Take 4,000 mL by mouth As Directed. Take 2,000 mL the night before your procedure and 2,000 mL the morning of your procedure. 1 each 0    losartan 50 MG Oral Tab Take 1 tablet (50 mg total) by mouth daily. 90 tablet 3    rosuvastatin 10 MG Oral Tab rosuvastatin 10 mg tablet, [RxNorm: 236683]      pantoprazole 40 MG Oral Tab EC Take 1 tablet (40 mg total) by mouth daily.      metoprolol succinate  MG Oral Tablet 24 Hr Take 1 tablet (100 mg total) by mouth daily.      Azelastine HCl 137 MCG/SPRAY Nasal Solution 2 sprays by Nasal route 2 (two) times daily. (Patient not taking: Reported on 1/10/2025) 30 mL 5    Probiotic Product (ALIGN OR) Take by mouth.       Allergies:  Banana, Cantaloupe, Cephalosporins, Sulfamethoxazole, Trimethoprim, Milk-related compounds, Dust mite extract, and Penicillins     Social History:    Social History     Socioeconomic History    Marital status:      Spouse name: Not on file    Number of children: Not on file    Years of education: Not on file    Highest education level: Not on file   Occupational History    Not on file   Tobacco Use    Smoking status: Former     Current packs/day: 0.00     Types: Cigarettes     Quit date: 1986     Years since quittin.5     Passive exposure: Past    Smokeless tobacco: Never    Tobacco comments:     I have not smoked in over 35 years   Vaping Use    Vaping status: Never Used   Substance and Sexual Activity    Alcohol use: Yes     Alcohol/week: 2.0 standard drinks of alcohol     Types: 2 Cans of beer per week     Comment: minimal consumption    Drug use: No    Sexual activity: Not on file   Other Topics Concern     Service Not Asked    Blood Transfusions Not Asked    Caffeine Concern Yes     Comment: Coffee, 1 cups daily; Tea;     Occupational Exposure Not Asked    Hobby Hazards Not Asked    Sleep Concern Not Asked    Stress  Concern Not Asked    Weight Concern Not Asked    Special Diet Not Asked    Back Care Not Asked    Exercise Yes     Comment: walking, yoga    Bike Helmet Not Asked    Seat Belt Not Asked    Self-Exams Not Asked    Grew up on a farm No    History of tanning No    Outdoor occupation No    Pt has a pacemaker No    Pt has a defibrillator No    Breast feeding No    Reaction to local anesthetic No    Left Handed No    Right Handed Yes    Currently spends a great deal of time in the sun No    Past Sunlamp Treatments for Acne Not Asked    Hx of Spending Great Deal of Time in Sun No    Bad sunburns in the past No    Tanning Salons in the Past No    Hx of Radiation Treatments Yes    Regular use of sun block Not Asked   Social History Narrative    The patient does not use an assistive device..      The patient does live in a home with stairs.     Social Drivers of Health     Financial Resource Strain: Not on file   Food Insecurity: Not on file   Transportation Needs: Not on file   Physical Activity: Not on file   Stress: Not on file   Social Connections: Not on file   Housing Stability: Low Risk  (2023)    [facility]    Housing Stability     Mortgage Payment Concerns?: Not on file     Number of Places Lived in the Last Year: Not on file     Unstable Housing?: Not on file     Surgical History:    Past Surgical History:   Procedure Laterality Date          x 4    Colonoscopy  2013    per NG    Colonoscopy      2013    Hysteroscopy  2017    myosure polypectomy    Knee arthroscopy Right     x3 last     Maximus localization wire 1 site left (cpt=19281)      Maximus localization wire 1 site right (cpt=19281)      Other surgical history       arthroscopic surgery 3 times right knee meniscal tear    Radiation Bilateral 04/15/2019    recent radiation tx for breast cancer    Reduction left  2014    Reduction of large breast Bilateral      Reduction right  05/2014     Family History:    Family History   Adopted: Yes   Family history unknown: Yes       Food Journal  Reviewed and Discussed:       Patient has a Food Journal?: yes   Patient is reading nutrition labels?  yes  Average Caloric Intake:     Average CHO Intake: 120  Is patient exercising? yes  Type of exercise? [facility]    Eating Habits  Patient states the following:  Eats 3 meal(s) per day  Length of time it takes to consume a meal:  20  # of snacks per day: 1 Type of snacks:  fruit  Amount of soda consumption per day:    Amount of water (in ounces) per day:  64  Drinking between meals only:  yes  Toughest challenge:      Nutritional Goals  Limit carbohydrates to 100 gms per day, Eat 100-200 calories within 1 hour of waking , and Eat 3-4 cups of fresh fruits or vegetables daily    Behavior Modifications Reviewed and Discussed  Eat breakfast, Eat 3 meals per day, Plan meals in advance, Read nutrition labels, Drink 64 oz of water per day, Maintain a daily food journal, No drinking 30 minutes before or after meals, Utlize portion control strategies to reduce calorie intake, Identify triggers for eating and manage cues, and Eat slowly and take 20 to 30 minutes to complete each meal    Exercise Goals Reviewed and Discussed    Continue [facility]    ROS:    Constitutional: negative  Respiratory: negative  Cardiovascular: negative  Gastrointestinal: negative  Musculoskeletal:negative  Neurological: negative  Behavioral/Psych: negative  Endocrine: negative  All other systems were reviewed and are negative    Physical Exam:   General appearance: alert, appears stated age, cooperative, and mildly obese  Head: Normocephalic, without obvious abnormality, atraumatic  Back: symmetric, no curvature. ROM normal. No CVA tenderness.  Lungs: clear to auscultation bilaterally  Heart: S1, S2 normal, no murmur, click, rub or gallop, regular rate and rhythm  Abdomen: soft, non-tender; bowel sounds normal; no masses,  no  organomegaly  Extremities: extremities normal, atraumatic, no cyanosis or edema  Pulses: 2+ and symmetric  Skin: Skin color, texture, turgor normal. No rashes or lesions  Neurologic: Grossly normal    ASSESSMENT     HYPERTENSION:  The patient's blood pressure has been well controlled.  she has been checking it as instructed and has remained in relatively good control.    HYPERCHOLESTEROLEMIA:  The patient states that her cholesterol has been well controlled on her current medication.    Lab Results   Component Value Date/Time    CHOLEST 206 (H) 06/27/2024 08:43 AM     (H) 06/27/2024 08:43 AM    HDL 64 (H) 06/27/2024 08:43 AM    TRIG 128 06/27/2024 08:43 AM    VLDL 22 06/27/2024 08:43 AM       Encounter Diagnosis(ses):   Encounter Diagnoses   Name Primary?    HTN (hypertension), benign Yes    LAURY on CPAP     Dyslipidemia     Encounter for therapeutic drug monitoring     Overweight (BMI 25.0-29.9)        PLAN     Patient is not interested in bariatric surgery. Patient desires to pursue traditional weight loss at this time.      HYPERTENSION: Blood pressure stable on the above medications. No interval change in antihypertensive medication.     Patient was instructed to continue wearing their CPaP as recommended.     DYSLIPIDEMIA: Stable on the above prescribed meal plan and medication. Liver function stable.    Lab Results   Component Value Date/Time    CHOLEST 206 (H) 06/27/2024 08:43 AM     (H) 06/27/2024 08:43 AM    HDL 64 (H) 06/27/2024 08:43 AM    TRIG 128 06/27/2024 08:43 AM    VLDL 22 06/27/2024 08:43 AM       Goals for next month:  1. Keep a food log.  2. Drink 48-64 ounces of non-caloric beverages per day. No fruit juices or regular soda.  3. Increase activity-upper body exercises, walk 10 minutes per day.  4. Increase fruit and vegetable servings to 5-6 per day.          PHENTERMINE:did not tolerate well    Continue to work out at [facility]      Has good motivation    Follow up with oncology  team    Compound semaglutide is a custom-made medication that mimics the GLP-1 hormone. It is used to treat type 2 diabetes and lower the risk of heart or blood vessel disease. It works by increasing insulin release, lowering glucagon release, delaying gastric emptying and reducing appetite. Compound semaglutide is prescribed when an FDA-approved medication, dose, or dosage form is unavailable (ie. Nationwide shortage or no obesity coverage for GLP-1 meds).     Cost of these medications is  dollars monthly based on dose.    Will start at 0.25 mg      Diagnoses and all orders for this visit:    HTN (hypertension), benign    LAURY on CPAP    Dyslipidemia    Encounter for therapeutic drug monitoring    Overweight (BMI 25.0-29.9)          Massimo Biswas MD

## 2025-01-30 ENCOUNTER — LAB ENCOUNTER (OUTPATIENT)
Dept: LAB | Age: 65
End: 2025-01-30
Attending: INTERNAL MEDICINE
Payer: MEDICARE

## 2025-01-30 DIAGNOSIS — I10 HTN (HYPERTENSION): Primary | ICD-10-CM

## 2025-01-30 DIAGNOSIS — I50.20 SYSTOLIC HEART FAILURE (HCC): ICD-10-CM

## 2025-01-30 LAB
ANION GAP SERPL CALC-SCNC: 7 MMOL/L (ref 0–18)
BUN BLD-MCNC: 14 MG/DL (ref 9–23)
BUN/CREAT SERPL: 13.1 (ref 10–20)
CALCIUM BLD-MCNC: 10.2 MG/DL (ref 8.7–10.4)
CHLORIDE SERPL-SCNC: 104 MMOL/L (ref 98–112)
CO2 SERPL-SCNC: 30 MMOL/L (ref 21–32)
CREAT BLD-MCNC: 1.07 MG/DL
EGFRCR SERPLBLD CKD-EPI 2021: 58 ML/MIN/1.73M2 (ref 60–?)
FASTING STATUS PATIENT QL REPORTED: NO
GLUCOSE BLD-MCNC: 111 MG/DL (ref 70–99)
OSMOLALITY SERPL CALC.SUM OF ELEC: 293 MOSM/KG (ref 275–295)
POTASSIUM SERPL-SCNC: 3.6 MMOL/L (ref 3.5–5.1)
SODIUM SERPL-SCNC: 141 MMOL/L (ref 136–145)

## 2025-01-30 PROCEDURE — 36415 COLL VENOUS BLD VENIPUNCTURE: CPT

## 2025-01-30 PROCEDURE — 80048 BASIC METABOLIC PNL TOTAL CA: CPT

## 2025-02-03 ENCOUNTER — ORDER TRANSCRIPTION (OUTPATIENT)
Dept: SLEEP CENTER | Age: 65
End: 2025-02-03

## 2025-02-03 DIAGNOSIS — G47.33 OSA (OBSTRUCTIVE SLEEP APNEA): Primary | ICD-10-CM

## 2025-02-05 ENCOUNTER — OFFICE VISIT (OUTPATIENT)
Dept: DERMATOLOGY CLINIC | Facility: CLINIC | Age: 65
End: 2025-02-05
Payer: MEDICARE

## 2025-02-05 DIAGNOSIS — D23.9 BENIGN NEOPLASM OF SKIN, UNSPECIFIED LOCATION: ICD-10-CM

## 2025-02-05 DIAGNOSIS — Z85.820 ENCOUNTER FOR FOLLOW-UP SURVEILLANCE OF MELANOMA: ICD-10-CM

## 2025-02-05 DIAGNOSIS — Z51.81 MEDICATION MONITORING ENCOUNTER: ICD-10-CM

## 2025-02-05 DIAGNOSIS — L81.4 LENTIGO: ICD-10-CM

## 2025-02-05 DIAGNOSIS — D48.5 NEOPLASM OF UNCERTAIN BEHAVIOR OF SKIN: Primary | ICD-10-CM

## 2025-02-05 DIAGNOSIS — Z13.89 ENCOUNTER FOR SURVEILLANCE OF ABNORMAL NEVI: ICD-10-CM

## 2025-02-05 DIAGNOSIS — L82.1 SEBORRHEIC KERATOSES: ICD-10-CM

## 2025-02-05 DIAGNOSIS — L57.0 AK (ACTINIC KERATOSIS): ICD-10-CM

## 2025-02-05 DIAGNOSIS — Z08 ENCOUNTER FOR FOLLOW-UP SURVEILLANCE OF MELANOMA: ICD-10-CM

## 2025-02-05 DIAGNOSIS — D22.9 MULTIPLE NEVI: ICD-10-CM

## 2025-02-05 PROCEDURE — 99214 OFFICE O/P EST MOD 30 MIN: CPT | Performed by: DERMATOLOGY

## 2025-02-05 PROCEDURE — 11102 TANGNTL BX SKIN SINGLE LES: CPT | Performed by: DERMATOLOGY

## 2025-02-05 PROCEDURE — 88305 TISSUE EXAM BY PATHOLOGIST: CPT | Performed by: DERMATOLOGY

## 2025-02-05 RX ORDER — NEOMYCIN SULFATE, POLYMYXIN B SULFATE, AND DEXAMETHASONE 3.5; 10000; 1 MG/G; [USP'U]/G; MG/G
OINTMENT OPHTHALMIC
COMMUNITY
Start: 2024-12-23

## 2025-02-05 RX ORDER — METOPROLOL TARTRATE 50 MG
TABLET ORAL
COMMUNITY
Start: 2025-01-20

## 2025-02-05 NOTE — PROGRESS NOTES
The following individual(s) verbally consented to be recorded using ambient AI listening technology and understand that they can each withdraw their consent to this listening technology at any point by asking the clinician to turn off or pause the recording: Mirta Miller

## 2025-02-08 NOTE — PROGRESS NOTES
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  -The atypical nevus appears excised in the examined planes of section  No further surgery. Follow-up in 3-4 mos for skin exam  Please log in test results.  Please call patient and inform of results and recommendations.  (Added to history).  Pt to  rtc  as above or prn.

## 2025-02-09 NOTE — PROGRESS NOTES
Operative Report                     Shave/  Tangential biopsy     Clinical diagnosis:    Size of lesion:    Location: Patient with dark macule with hazy border 1.1cm r/o atypical pigmented lesion right chest     Procedure:    With patient in appropriate position the skin of the above was scrubbed with alcohol.  Anesthesia was obtained with 1% Xylocaine with epinephrine.  The skin surrounding the lesion was placed under tension and the lesion was incised using a #15 scalpel blade.  The specimen was sent for histopathologic exam.    Hemostasis was obtained with electrocautery/aluminum chloride.  Estimated blood loss less than 2 cc.    Biopsy dressed with Polysporin, bandage.    Pressure dressing:   No    Complications: None    Written instructions given and reviewed with patient    Await pathology    Contact information reviewed.    Procedural physician:  Floresita Plascencia MD

## 2025-02-09 NOTE — PROGRESS NOTES
Mirta Miller is a 64 year old female.  HPI:     CC:    Chief Complaint   Patient presents with    Full Skin Exam     Hx of Aks and melanoma.  LOV 2024.  Pt presents for FBSE.  Lesion of concern to bilateral temples and left hair-line.  Ke bleeding or pain.          Allergies:  Banana, Cantaloupe, Cephalosporins, Sulfamethoxazole, Trimethoprim, Milk-related compounds, Dust mite extract, and Penicillins    HISTORY:    Past Medical History:    Actinic keratosis    Allergic rhinitis    Anesthesia complication    blood pressure bottomed out - 6-8 hrs after surgery. Revived on own when staff moved pt. after breast reduction    Anxiety    Anxiety state    Atypical nevus    right chest    Cancer (HCC)    Breast Cancer DX     Chronic rhinitis    Cough variant asthma (HCC)    Depression    Diverticulitis    Dyslipidemia    Environmental allergies    Esophageal reflux    History of neuroma    \"Neuroma 3 rt\"    HTN (hypertension), benign    Left ventricular systolic dysfunction (LVSD)    Melanoma in situ (HCC)    Left chest    Metatarsalgia    \"modified insole / rx naprosyn\"    LAURY on CPAP    Osteoarthritis    PONV (postoperative nausea and vomiting)    Right knee injury    Arthroscopy    Sleep apnea    Visual impairment    glasses/contacts,dry eye      Past Surgical History:   Procedure Laterality Date          x 4    Colonoscopy  2013    per NG    Colonoscopy      2013    Hysteroscopy  2017    myosure polypectomy    Knee arthroscopy Right     x3 last     [facility] localization wire 1 site left (cpt=19281)      Maximus localization wire 1 site right (cpt=19281)      Other surgical history       arthroscopic surgery 3 times right knee meniscal tear    Radiation Bilateral 04/15/2019    recent radiation tx for breast cancer    Reduction left  2014    Reduction of large breast Bilateral 2014    Reduction right  2014      Family History   Adopted: Yes   Family history unknown: Yes      Social  History     Socioeconomic History    Marital status:    Tobacco Use    Smoking status: Former     Current packs/day: 0.00     Types: Cigarettes     Quit date: 1986     Years since quittin.5     Passive exposure: Past    Smokeless tobacco: Never    Tobacco comments:     I have not smoked in over 35 years   Vaping Use    Vaping status: Never Used   Substance and Sexual Activity    Alcohol use: Yes     Alcohol/week: 2.0 standard drinks of alcohol     Types: 2 Cans of beer per week     Comment: minimal consumption    Drug use: No   Other Topics Concern    Caffeine Concern Yes     Comment: Coffee, 1 cups daily; Tea;     Exercise Yes     Comment: walking, yoga    Grew up on a farm No    History of tanning Yes    Outdoor occupation No    Pt has a pacemaker No    Pt has a defibrillator No    Breast feeding No    Reaction to local anesthetic No    Left Handed No    Right Handed Yes    Currently spends a great deal of time in the sun No    Hx of Spending Great Deal of Time in Sun No    Bad sunburns in the past No    Tanning Salons in the Past No    Hx of Radiation Treatments Yes   Social History Narrative    The patient does not use an assistive device..      The patient does live in a home with stairs.     Social Drivers of Health      [facility], [facility]    Housing Stability        Current Outpatient Medications   Medication Sig Dispense Refill    metoprolol tartrate 50 MG Oral Tab TAKE ONE TABLET BY MOUTH THE NIGHT BEFORE CTA AND THE MORNING OF CTA      neomycin-polymyxin-dexamethasone 3.5-75157-7.1 Ophthalmic Ointment Apply 1 application in both eyes nightly      CUSTOM MEDICATION Semaglutide/ cyanocobalamin    0.25 mg-   concentration: 1 /0.5 mg/ ML  Amount:  1 Ml vial  Instructions: inject 25 units/ 0.25 ML q weekly 1 each 5    ALPRAZolam 0.5 MG Oral Tab Take 1 tablet (0.5 mg total) by mouth 2 (two) times daily as needed for Anxiety. 60 tablet 0     polyethylene glycol, PEG 3350-KCl-NaBcb-NaCl-NaSulf, 236 g Oral Recon Soln Take 4,000 mL by mouth As Directed. Take 2,000 mL the night before your procedure and 2,000 mL the morning of your procedure. 1 each 0    losartan 50 MG Oral Tab Take 1 tablet (50 mg total) by mouth daily. 90 tablet 3    rosuvastatin 10 MG Oral Tab rosuvastatin 10 mg tablet, [RxNorm: 357382]      pantoprazole 40 MG Oral Tab EC Take 1 tablet (40 mg total) by mouth daily.      metoprolol succinate  MG Oral Tablet 24 Hr Take 1 tablet (100 mg total) by mouth daily.      Azelastine HCl 137 MCG/SPRAY Nasal Solution 2 sprays by Nasal route 2 (two) times daily. 30 mL 5    Probiotic Product (ALIGN OR) Take by mouth.       Allergies:   Allergies   Allergen Reactions    Banana HIVES    Cantaloupe HIVES    Cephalosporins HIVES    Sulfamethoxazole HIVES    Trimethoprim HIVES    Milk-Related Compounds PAIN     Stomach pain, diarrhea    Dust Mite Extract ITCHING    Penicillins HIVES, RASH and UNKNOWN       Past Medical History:    Actinic keratosis    Allergic rhinitis    Anesthesia complication    blood pressure bottomed out - 6-8 hrs after surgery. Revived on own when staff moved pt. after breast reduction    Anxiety    Anxiety state    Atypical nevus    right chest    Cancer (HCC)    Breast Cancer DX     Chronic rhinitis    Cough variant asthma (HCC)    Depression    Diverticulitis    Dyslipidemia    Environmental allergies    Esophageal reflux    History of neuroma    \"Neuroma 3 rt\"    HTN (hypertension), benign    Left ventricular systolic dysfunction (LVSD)    Melanoma in situ (HCC)    Left chest    Metatarsalgia    \"modified insole / rx naprosyn\"    LAURY on CPAP    Osteoarthritis    PONV (postoperative nausea and vomiting)    Right knee injury    Arthroscopy    Sleep apnea    Visual impairment    glasses/contacts,dry eye     Past Surgical History:   Procedure Laterality Date          x 4    Colonoscopy  2013    per OLIVER     Colonoscopy      2013    Hysteroscopy  2017    myosure polypectomy    Knee arthroscopy Right     x3 last 2013    Maximus localization wire 1 site left (cpt=19281)      Maximus localization wire 1 site right (cpt=19281)  2009    Other surgical history       arthroscopic surgery 3 times right knee meniscal tear    Radiation Bilateral 04/15/2019    recent radiation tx for breast cancer    Reduction left  2014    Reduction of large breast Bilateral 2014    Reduction right  2014     Social History     Socioeconomic History    Marital status:      Spouse name: Not on file    Number of children: Not on file    Years of education: Not on file    Highest education level: Not on file   Occupational History    Not on file   Tobacco Use    Smoking status: Former     Current packs/day: 0.00     Types: Cigarettes     Quit date: 1986     Years since quittin.5     Passive exposure: Past    Smokeless tobacco: Never    Tobacco comments:     I have not smoked in over 35 years   Vaping Use    Vaping status: Never Used   Substance and Sexual Activity    Alcohol use: Yes     Alcohol/week: 2.0 standard drinks of alcohol     Types: 2 Cans of beer per week     Comment: minimal consumption    Drug use: No    Sexual activity: Not on file   Other Topics Concern     Service Not Asked    Blood Transfusions Not Asked    Caffeine Concern Yes     Comment: Coffee, 1 cups daily; Tea;     Occupational Exposure Not Asked    Hobby Hazards Not Asked    Sleep Concern Not Asked    Stress Concern Not Asked    Weight Concern Not Asked    Special Diet Not Asked    Back Care Not Asked    Exercise Yes     Comment: walking, yoga    Bike Helmet Not Asked    Seat Belt Not Asked    Self-Exams Not Asked    Grew up on a farm No    History of tanning Yes    Outdoor occupation No    Pt has a pacemaker No    Pt has a defibrillator No    Breast feeding No    Reaction to local anesthetic No    Left Handed No    Right Handed Yes    Currently  spends a great deal of time in the sun No    Past Sunlamp Treatments for Acne Not Asked    Hx of Spending Great Deal of Time in Sun No    Bad sunburns in the past No    Tanning Salons in the Past No    Hx of Radiation Treatments Yes    Regular use of sun block Not Asked   Social History Narrative    The patient does not use an assistive device..      The patient does live in a home with stairs.     Social Drivers of Health     Food Insecurity: Not on file   Transportation Needs: Not on file   Stress: Not on file   Housing Stability: Low Risk  (1/26/2023)    Received [facility], [facility]    Housing Stability     Mortgage Payment Concerns?: Not on file     Number of Places Lived in the Last Year: Not on file     Unstable Housing?: Not on file     Family History   Adopted: Yes   Family history unknown: Yes       There were no vitals filed for this visit.    HPI:    Chief Complaint   Patient presents with    Full Skin Exam     Hx of Aks and melanoma.  LOV 8/2024.  Pt presents for FBSE.  Lesion of concern to bilateral temples and left hair-line.  Ek bleeding or pain.      Follow-up AK's post fluorouracil has noted improvement.  Had been using this only and central upper lip area tolerated no side effects irritation subsided quickly  New area came up at left upper lip.  Slightly red still using fluorouracil twice weekly.  Had been careful while away in [place] with sunscreen hat.      Patient adopted unknown family history  Patient here for follow-up history melanoma in situ at left chest post wide excision  Concern with lesion at lateral orbit  No prior Personal history of skin cancer or atypical lesions removed.  Lots of sun exposure in the past.  Using Vichy product helping.  Overall doing well careful sun protection.      History of Present Illness  Mirta Miller is a 64 year old female who presents with concerns about skin lesions.    She has several skin  lesions,   Multiple scaling lesions along the bra line    She is scheduled for a CT scan with dye for cardiac evaluation, which she finds surprising and questions its necessity.        Concerns as above    Patient presents with concerns above.    Patient has been in their usual state of health.  History, medications, allergies reviewed as noted.      ROS:  Denies any other systemic complaints.  No new or changeing lesions other than noted above. No fevers, chills, night sweats, unusual sun sensitivity.  No other skin complaints.        History, medications, allergies reviewed as noted.       Physical Examination:     Well-developed well-nourished patient alert oriented in no acute distress.  Exam total-body performed, including scalp, head, neck, face,nails, hair, external eyes, including conjunctival mucosa, eyelids, lips external ears, back, chest,/ breasts, axillae,  abdomen, arms, legs, palms.     Multiple light to medium brown, well marginated, uniformly pigmented, macules and papules 6 mm and less scattered on exam. pigmented lesions examined with dermoscopy benign-appearing patterns.     Waxy tannish keratotic papules scattered, cherry-red vascular papules scattered.    See map today's date for lesions noted .      Otherwise remarkable for lesions as noted on map.  See details of examination  See Assessment /Plan for additional history and physical exam also:    Assessment / plan:    Orders Placed This Encounter   Procedures    Specimen to Pathology, Tissue [[facility] Pt to [facility] lab]       Meds & Refills for this Visit:  Requested Prescriptions      No prescriptions requested or ordered in this encounter         Encounter Diagnosis   Name Primary?    Neoplasm of uncertain behavior of skin Yes       See details on map.      Remarkable for:      Patient seen for follow-up long-term monitoring, treatment of  Actinic keratoses, abnormal nevi, sun damage, history of melanoma, history of breast cancer  Requiring  ongoing surveillance, monitoring.  See previous notes.  Patient requiring frequent monitoring, long-term surveillance for above diagnoses, office visits every 3 to 4 months, self monitoring and follow-up as needed in the interim.  Plan is continued surveillance as noted and ongoing education  There is a longitudinal care relationship with me, the care plan reflects the ongoing nature of the continuous relationship of care, and the medical record indicates that there is ongoing treatment of a serious/complex medical condition which I am currently managing.  is Applicable    Assessment & Plan  Lesion on chest given history of melanoma in situ plan biopsy. Biopsy needed for confirmation and treatment planning.  - Perform biopsy of the lesion    Seborrheic Keratosis  Presents with crusty, moist, and hard lesions consistent with seborrheic keratosis, a benign skin growth. Discussed that these lesions are non-cancerous and typically do not require treatment unless symptomatic. Topical treatments can manage symptoms.  - Provide gel for topical application    General Health Maintenance  Concern about scheduled CT with dye for heart evaluation related to previous colon issue. Discussed necessity of CT scan for accurate heart condition assessment and potential risks of dye, including allergic reactions and kidney issues.  - Proceed with scheduled CT with dye for heart evaluation.         Patient with dark macule with hazy border 1.1cm r/o atypical pigmented lesion right chest   Shave/ tangential biopsy performed, operative note and consent in chart further plans pending pathology    No active AK's currently history of fluorouracil use.  Has noted significant improvement actinic Keratoses.  Precancerous nature discussed. Sun protection, sunscreen/ blocks encouraged .  Monitoring for new lesions.  Sun damage additional recurrent and new actinic keratoses, skin cancers may occur in areas of prior actinic keratoses, related  to past sun exposure to minimize current sun exposure.  Sunscreen applied consistently regularly, reapplication and sun protection while driving recommended.  High risk patient continue careful monitoring    No new suspicious lesions otherwise exam unchanged    otherwise no new atypical appearing lesions  -pt with history of melanoma, at high risk for nonmelanoma skin cancers, history of breast cancer active actinic keratoses.  Extensive sun damage, numerous pigmented lesions    Lesion left ear, shoulder benign keratoses reassurance.  No atypical features   Exam otherwise unchanged  right mid back, shave biopsy:  -Compound lentiginous nevus10/22 no recurrence     left chest melanoma in situ6/22  Patient post wide excision melanoma in situ healed well.  Continue careful sun protection regular follow-up    Lesion of concern right lateral orbit, temple benign lentigo continue monitoring appears unchanged  Lesion at right lateral orbit tan patch consistent with lentigo recommend observation.  Lumbar back lesions benign keratoses more lesions however noted previously patient unchanged.  Continue monitoring    Remaining pigmented lesions without evidence of atypical changes no significant changes in exam.         plan follow-up for full skin exam in 3 to 4 months    Waxy tan keratotic papules lesions in areas of concern as noted reassurance given.  Benign nature discussed.  Possibility of cryo, alphahydroxy acids over-the-counter retinol's discussed.    Lentigo left lateral thigh observe.    Moderate sun damage with extensive lentigines keratoses.  Given history of breast cancer, sun exposure at least annual follow-up in future.  Patient adopted family history unknown careful sun protection, further plans pending pathology  Patient at high risk for both melanoma and nonmelanoma skin cancers new lesions.  Continue careful monitoring every 4 months.    History of verrucoid keratosis right forearm no recurrence    Waxy tan  keratotic papule left temple reassurance regarding benign seborrheic keratosis reassurance given    Scattered keratoses back chest neck shoulders no suspicious lesions multiple lentigines reassurance continue sun protection    Few milia.  Most prominent tong-nasally, cheeks and forehead on examination multiple whitish dome-shaped smooth papules are noted.  Milia diagnosis , pathophysiology discussed.  Over-the-counter retinol products may cause peeling irritation.  Differin gel over-the-counter consider prescription Retin-A if worsening.  No new suspicious lesions    Multiple nevi no significant changes.  Continue regular skin checks.  Follow-up 1 year or as needed    Please refer to map for specific lesions.  See additional diagnoses.  Pros cons of various therapies, risks benefits discussed.Pathophysiology discussed with patient.  Therapeutic options reviewed.  See  Medications in grid.  Instructions reviewed at length.    Benign nevi, seborrheic  keratoses, cherry angiomas:  Reassurance regarding other benign skin lesions.    Monitor for new or changing lesions. Signs and symptoms of skin cancer, ABCDE's of melanoma ( additional information available at AAD.org, skincancer.org) Encourage Sunscreen (broad-spectrum, ideally mineral-based-UVA/UVB -SPF 30 or higher) use encouraged, sun protection/sun protective clothing, self exams reviewed Followup as noted RTC ---routine checkup 6 mos -one year or p.r.n.    Encounter Times   Including precharting, reviewing chart, prior notes obtaining history: 10 minutes, medical exam :10 minutes, notes on body map, plan, counseling 10minutes My total time spent caring for the patient on the day of the encounter: 30 minutes     The patient indicates understanding of these issues and agrees to the plan.  The patient is asked to return as noted in follow-up/ above.    This note was generated using Dragon voice recognition software.  Please contact me regarding any confusion  resulting from errors in recognition..  Note to patient and family: The 21st Century Cures Act makes medical notes like these available to patients. However, be advised this is a medical document. It is intended as [id number]-xo-fgrn communication and monitoring of a patient's care needs. It is written in medical language and may contain abbreviations or verbiage that are unfamiliar. It may appear blunt or direct. Medical documents are intended to carry relevant information, facts as evident and the clinical opinion of the practitioner.

## 2025-02-17 ENCOUNTER — TELEPHONE (OUTPATIENT)
Dept: DERMATOLOGY CLINIC | Facility: CLINIC | Age: 65
End: 2025-02-17

## 2025-02-24 ENCOUNTER — TELEPHONE (OUTPATIENT)
Dept: PULMONOLOGY | Facility: CLINIC | Age: 65
End: 2025-02-24

## 2025-02-24 DIAGNOSIS — G47.33 OSA (OBSTRUCTIVE SLEEP APNEA): Primary | ICD-10-CM

## 2025-02-26 NOTE — TELEPHONE ENCOUNTER
Patient calling stating she woke up this morning with spotting. She is a cancer survivor and on some certain medication and wondering if that could have anything to do with it.     Please advise Per  pt needs Pet Scan.

## 2025-03-25 ENCOUNTER — OFFICE VISIT (OUTPATIENT)
Dept: SLEEP CENTER | Age: 65
End: 2025-03-25
Attending: DENTIST
Payer: MEDICARE

## 2025-03-25 DIAGNOSIS — G47.33 OSA (OBSTRUCTIVE SLEEP APNEA): ICD-10-CM

## 2025-03-25 PROCEDURE — 95806 SLEEP STUDY UNATT&RESP EFFT: CPT

## 2025-03-26 ENCOUNTER — TELEPHONE (OUTPATIENT)
Dept: PHYSICAL MEDICINE AND REHAB | Facility: CLINIC | Age: 65
End: 2025-03-26

## 2025-03-26 DIAGNOSIS — M17.0 PRIMARY OSTEOARTHRITIS OF KNEES, BILATERAL: Primary | ICD-10-CM

## 2025-03-26 NOTE — TELEPHONE ENCOUNTER
The patient has requested bilateral knee injections to coincide with their appointment on 4/2/2025 with Dr. Marcelina Martinez DO.  The patient's last office visit was on 9/18/2024, during which a right knee aspiration and corticosteroid injection were performed. It was recommended that the patient follow up on 12/18/2024(3 month follow up).    Please advise.

## 2025-03-26 NOTE — TELEPHONE ENCOUNTER
Patient called to inform that she has schedule on apt via [employer] for 4/2/25 she is requesting bilateral knee injections and is asking if someone can give her a call to confirm if this are approved by her insurance. Inform that in the future injections apt should not be made on [employer] as this have to be approved by the insurance prior to schedule the apt. Please call to advice.

## 2025-03-27 NOTE — PROCEDURES
[facility]  Accredited by the American Academy of Sleep Medicine (AASM)    PATIENT'S NAME: ELENO GERMAIN   ATTENDING PHYSICIAN: Elliot Watson MD   REFERRING PHYSICIAN: Sonido Gonzalez MD   PATIENT ACCOUNT #: [id number] LOCATION: Sleep Center   MEDICAL RECORD #: [id number] YOB: 1960   DATE OF STUDY: 03/25/2025       SLEEP STUDY REPORT    STUDY TYPE:  Home sleep test with oral appliance therapy.    INDICATION:  Severe obstructive sleep apnea (ICD-10 code G47.33) in patient with baseline apnea plus hypopnea index of 47.6 events per hour.     RESULTS:  The patient underwent home sleep test with measurement of her nasal air flow, nasal air pressure, snoring, chest and abdominal wall motion, oximetry, and body position while wearing her oral appliance.  The total recording time is 582 minutes.  The lights-out clock time is 11 p.m., lights-on clock time is 8:42 a.m.  The apnea plus hypopnea index is 17.5 events per hour, the supine apnea plus hypopnea index is 21.4 events per hour.  The average oxygen saturation is 92%, the lowest oxygen saturation is 86%, and the patient spent 0.5% of the test with saturations 88% or less.  The average heart rate is 75 beats per minute, and the patient spent approximately 55% of the study in the supine position.    INTERPRETATION:  The data generated from this study is consistent with significant improvement in the apnea plus hypopnea index, falling from baseline 47 events per hour down to 17 events per hour.    RECOMMENDATIONS:    1.   Vigilance with oral appliance therapy.  2.   Weight loss.  3.   Avoid alcohol.  4.   Avoid sedating drug.  5.   Patient should not drive if at all sleepy.    Please do not hesitate to contact me if there is any question whatsoever regarding interpretation of this study.    Dictated By Sonido Gonzalez MD  d: 03/27/2025 07:52:08  t: 03/27/2025 08:56:50  Job [id number]  [facility]/    cc: Sonido ROJAS  MD Elliot Gonzalez MD

## 2025-03-31 NOTE — TELEPHONE ENCOUNTER
Initiated authorization for Bilateral knee aspiration and injection with Hyaluronic Acid. CPT/HCPCS 88169-85,  Ashley dx:M17.0 with Availity portal.    Status: No auth required  Preferred Has for [employer] are: Durolane, Synvisc, and Euflexxa  Authorization is not a guarantee of payment and may be subject to review once claim is submitted.

## 2025-04-01 DIAGNOSIS — F41.1 GAD (GENERALIZED ANXIETY DISORDER): ICD-10-CM

## 2025-04-02 ENCOUNTER — OFFICE VISIT (OUTPATIENT)
Dept: PHYSICAL MEDICINE AND REHAB | Facility: CLINIC | Age: 65
End: 2025-04-02
Payer: MEDICARE

## 2025-04-02 DIAGNOSIS — M17.11 PRIMARY OSTEOARTHRITIS OF RIGHT KNEE: ICD-10-CM

## 2025-04-02 DIAGNOSIS — M17.0 PRIMARY OSTEOARTHRITIS OF KNEES, BILATERAL: Primary | ICD-10-CM

## 2025-04-02 DIAGNOSIS — M25.561 ACUTE PAIN OF RIGHT KNEE: ICD-10-CM

## 2025-04-02 RX ORDER — LOSARTAN POTASSIUM 50 MG/1
50 TABLET ORAL DAILY
Qty: 90 TABLET | Refills: 0 | OUTPATIENT
Start: 2025-04-02

## 2025-04-02 RX ORDER — LIDOCAINE HYDROCHLORIDE 10 MG/ML
3 INJECTION, SOLUTION INFILTRATION; PERINEURAL ONCE
Status: COMPLETED | OUTPATIENT
Start: 2025-04-02 | End: 2025-04-02

## 2025-04-02 RX ADMIN — LIDOCAINE HYDROCHLORIDE 3 ML: 10 INJECTION, SOLUTION INFILTRATION; PERINEURAL at 11:35:00

## 2025-04-02 NOTE — TELEPHONE ENCOUNTER
01/21/2025  LAST WRITTEN: 01/21/2025  Quantity: 60      Recent Visits  Date Type Provider Dept   11/01/24 Office Visit Elliot Watson MD [facility]

## 2025-04-02 NOTE — TELEPHONE ENCOUNTER
Dr. Watson, please advise   Medication was routed from Pulmonary Clinical staff with the following message:    Hannah Maldonado CMA                                                                                                 4 minutes ago (2:25 PM)     TB  Patient had chronic cough due to lisinopril and was transitioned to losartan. Refill should come from PCP moving forward. Patient notified via [employer].         Is medication managed by Primary Care Provider appropriate, medication pended for your review/approval

## 2025-04-02 NOTE — PROCEDURES
Procedure:  RIGHT knee aspiration and injection with Hyaluronic acid  The risks, benefits and anticipated outcomes of the procedure, the risks and benefits of the alternatives to the procedure, and the roles and tasks of the personnel to be involved, were discussed with the patient, and the patient consents to the procedure and agrees to proceed.  UNIVERSAL PROTOCOL / SAFETY CHECKLIST  Sign in Communication: Completed  Time Out:  Team Confirms the Correct Patient, Correct Procedure, Correct Site and Site Marking, Correct Position (if applicable), Prep and Dry Time (if applicable).      The procedure was carried out under sterile prep with  with sterile gel.  A 27 ga 1&1/4in needle was introduced and advanced for skin anesthesia with 3 cc of 1% lidocaine.  Then, a 18 gauge 1.5 in needle was advanced in the suprapatellar bursa using the superior lateral knee injection approach and 16 cc of serous synovial fluid was aspirated.  Following aspiration, Durolane was injected.  The patient tolerated the procedure without complication and was instructed in post-procedure precautions.  See patient instructions.    Marcelina Martinez DO, FAAPMR & CAQSM  Physical Medicine and Rehabilitation/Sports Medicine  [facility]

## 2025-04-02 NOTE — TELEPHONE ENCOUNTER
Patient had chronic cough due to lisinopril and was transitioned to losartan. Refill should come from PCP moving forward. Patient notified via [email or web address].

## 2025-04-03 RX ORDER — ALPRAZOLAM 0.5 MG
0.5 TABLET ORAL 2 TIMES DAILY PRN
Qty: 60 TABLET | Refills: 0 | Status: SHIPPED | OUTPATIENT
Start: 2025-04-03

## 2025-04-03 RX ORDER — LOSARTAN POTASSIUM 50 MG/1
50 TABLET ORAL DAILY
Qty: 90 TABLET | Refills: 1 | Status: SHIPPED | OUTPATIENT
Start: 2025-04-03

## 2025-04-11 ENCOUNTER — HOSPITAL ENCOUNTER (OUTPATIENT)
Facility: HOSPITAL | Age: 65
Setting detail: HOSPITAL OUTPATIENT SURGERY
Discharge: HOME | End: 2025-04-11
Attending: INTERNAL MEDICINE | Admitting: INTERNAL MEDICINE
Payer: MEDICARE

## 2025-04-11 ENCOUNTER — ANESTHESIA (OUTPATIENT)
Dept: ENDOSCOPY | Facility: HOSPITAL | Age: 65
End: 2025-04-11
Payer: MEDICARE

## 2025-04-11 ENCOUNTER — ANESTHESIA EVENT (OUTPATIENT)
Dept: ENDOSCOPY | Facility: HOSPITAL | Age: 65
End: 2025-04-11
Payer: MEDICARE

## 2025-04-11 VITALS
SYSTOLIC BLOOD PRESSURE: 110 MMHG | DIASTOLIC BLOOD PRESSURE: 71 MMHG | HEIGHT: 68 IN | RESPIRATION RATE: 19 BRPM | HEART RATE: 79 BPM | WEIGHT: 180 LBS | BODY MASS INDEX: 27.28 KG/M2 | OXYGEN SATURATION: 94 %

## 2025-04-11 DIAGNOSIS — K57.92 DIVERTICULITIS: ICD-10-CM

## 2025-04-11 PROCEDURE — 45380 COLONOSCOPY AND BIOPSY: CPT | Performed by: INTERNAL MEDICINE

## 2025-04-11 PROCEDURE — 0DBL8ZX EXCISION OF TRANSVERSE COLON, VIA NATURAL OR ARTIFICIAL OPENING ENDOSCOPIC, DIAGNOSTIC: ICD-10-PCS | Performed by: INTERNAL MEDICINE

## 2025-04-11 RX ORDER — SODIUM CHLORIDE, SODIUM LACTATE, POTASSIUM CHLORIDE, CALCIUM CHLORIDE 600; 310; 30; 20 MG/100ML; MG/100ML; MG/100ML; MG/100ML
INJECTION, SOLUTION INTRAVENOUS CONTINUOUS
Status: DISCONTINUED | OUTPATIENT
Start: 2025-04-11 | End: 2025-04-11

## 2025-04-11 RX ADMIN — SODIUM CHLORIDE, SODIUM LACTATE, POTASSIUM CHLORIDE, CALCIUM CHLORIDE: 600; 310; 30; 20 INJECTION, SOLUTION INTRAVENOUS at 09:32:00

## 2025-04-11 NOTE — ANESTHESIA PREPROCEDURE EVALUATION
Anesthesia PreOp Note    HPI:     Mirta Miller is a 64 year old female who presents for preoperative consultation requested by: Zachary Yung MD    Date of Surgery: 4/11/2025    Procedure(s):  COLONOSCOPY  Indication: Diverticulitis    Relevant Problems   No relevant active problems       NPO:  Last Liquid Consumption Date: 04/11/25  Last Liquid Consumption Time: 0700  Last Solid Consumption Date: 04/10/25  Last Solid Consumption Time: 1100  Last Liquid Consumption Date: 04/11/25          History Review:  Patient Active Problem List    Diagnosis Date Noted    Mixed hyperlipidemia 09/27/2024    Osteopenia of multiple sites 09/27/2024    History of melanoma 09/27/2024    History of left breast cancer 09/27/2024    Left ventricular systolic dysfunction (LVSD) 09/27/2024    Primary osteoarthritis of right knee 09/18/2024    Asthma with COPD (chronic obstructive pulmonary disease) ([facility]) 08/27/2024    CKD (chronic kidney disease) stage 3, GFR 30-59 ml/min ([facility]) 08/27/2024    Cough variant asthma ([facility]) 02/12/2024    HTN (hypertension), benign 03/27/2023    Overweight (BMI 25.0-29.9) 03/27/2023    Dyslipidemia 03/27/2023    LAURY on CPAP 03/27/2023    Cardiomyopathy ([facility]) 11/14/2022    Primary osteoarthritis of knees, bilateral 08/20/2020    HNP (herniated nucleus pulposus), cervical 08/06/2020    Cervical radiculopathy 08/06/2020    Spinal stenosis in cervical region 08/06/2020    Osteoarthritis of fingers of hands, bilateral 07/09/2020    Numbness and tingling in both hands 07/09/2020    Arthritis of carpometacarpal (CMC) joint of both thumbs 10/01/2019    Depression, major, single episode, moderate ([facility]) 04/22/2019    Panic disorder without agoraphobia 04/22/2019    Malignant neoplasm of upper-inner quadrant of left breast in female, estrogen receptor positive ([facility]) 12/31/2018    LYNETTE (generalized anxiety disorder) 01/12/2017    Numerous moles 10/15/2016    Diffuse cystic mastopathy 07/02/2015    Colon cancer  screening 06/22/2015    Allergic rhinitis 12/13/2014    Allergic rhinitis due to pollen 07/21/2014       Past Medical History[1]    Past Surgical History[2]    Prescriptions Prior to Admission[3]  Current Medications and Prescriptions Ordered in Epic[4]    Allergies[5]    Family History[6]  Social Hx on file[7]    Available pre-op labs reviewed.     Lab Results   Component Value Date     01/30/2025    K 3.6 01/30/2025     01/30/2025    CO2 30.0 01/30/2025    BUN 14 01/30/2025    CREATSERUM 1.07 (H) 01/30/2025     (H) 01/30/2025    CA 10.2 01/30/2025          Vital Signs:  Body mass index is 27.37 kg/m².   height is 1.727 m (5' 8\") and weight is 81.6 kg (180 lb).   Vitals:    04/07/25 0925   Weight: 81.6 kg (180 lb)   Height: 1.727 m (5' 8\")        Anesthesia Evaluation     Patient summary reviewed and Nursing notes reviewed    History of anesthetic complications   Airway   Mallampati: II  TM distance: <3 FB  Neck ROM: full  Dental      Comment: Scattered Implants - upper/lower    Pulmonary - normal exam   (+) COPD mild, asthma, sleep apnea  Cardiovascular - normal exam  Exercise tolerance: good  (+) hypertension    Neuro/Psych    (+)  neuromuscular disease, anxiety/panic attacks,  depression      GI/Hepatic/Renal    (+) GERD    Endo/Other - negative ROS   Abdominal  - normal exam                 Anesthesia Plan:   ASA:  3  Plan:   MAC      I have informed Mirta Miller and/or legal guardian or family member of the nature of the anesthetic plan, benefits, risks including possible dental damage if relevant, major complications, and any alternative forms of anesthetic management.   All of the patient's questions were answered to the best of my ability. The patient desires the anesthetic management as planned.  Silvia Ellington CRNA  4/11/2025 9:23 AM  Present on Admission:  **None**           [1]   Past Medical History:   Actinic keratosis    Allergic rhinitis    Anesthesia complication    blood  pressure bottomed out - 6-8 hrs after surgery. Revived on own when staff moved pt. after breast reduction    Anxiety    Anxiety state    Atypical nevus    right chest    Cancer (HCC)    Breast Cancer DX     Cardiomyopathy (HCC)    due to covid 2022    Chronic rhinitis    Cough variant asthma (HCC)    Depression    Diverticulitis    Dyslipidemia    Environmental allergies    Esophageal reflux    Exposure to medical diagnostic radiation    High blood pressure    High cholesterol    History of adverse reaction to anesthesia    History of COVID-19    History of neuroma    \"Neuroma 3 rt\"    HTN (hypertension), benign    Left ventricular systolic dysfunction (LVSD)    Melanoma in situ (HCC)    Left chest    Metatarsalgia    \"modified insole / rx naprosyn\"    LAURY - oral device    Osteoarthritis    Personal history of antineoplastic chemotherapy    last 2019    PONV (postoperative nausea and vomiting)    Right knee injury    Arthroscopy    Sleep apnea    Visual impairment    glasses/contacts,dry eye   [2]   Past Surgical History:  Procedure Laterality Date          x 4    Colonoscopy  2013    per NG    Colonoscopy          Hysteroscopy  2017    myosure polypectomy    Knee arthroscopy Right     x3 last     [facility] localization wire 1 site left (cpt=19281)      Maximus localization wire 1 site right (cpt=19281)      Other surgical history       arthroscopic surgery 3 times right knee meniscal tear    Radiation Bilateral 04/15/2019    recent radiation tx for breast cancer    Reduction left  2014    Reduction of large breast Bilateral 2014    Reduction right  2014   [3]   Facility-Administered Medications Prior to Admission   Medication Dose Route Frequency Provider Last Rate Last Admin    [COMPLETED] lidocaine (Xylocaine) 1 % injection  3 mL Intradermal Once Marcelina Martinez Y, DO   3 mL at 25 1135    [COMPLETED] sodium hyaluronate (DUROLANE) 60 mg  60 mg Intra-articular Once Marcelina Martinez  Y, DO   60 mg at 04/02/25 1136     Medications Prior to Admission   Medication Sig Dispense Refill Last Dose/Taking    ALPRAZOLAM 0.5 MG Oral Tab Take 1 tablet (0.5 mg total) by mouth 2 (two) times daily as needed for Anxiety 60 tablet 0 Taking As Needed    losartan 50 MG Oral Tab Take 1 tablet (50 mg total) by mouth daily. 90 tablet 1 Taking    metoprolol tartrate 50 MG Oral Tab Take 1 tablet (50 mg total) by mouth daily.   Taking    rosuvastatin 10 MG Oral Tab nightly.   Taking    pantoprazole 40 MG Oral Tab EC Take 1 tablet (40 mg total) by mouth daily.   Taking    Probiotic Product (ALIGN OR) Take by mouth.   Taking    neomycin-polymyxin-dexamethasone 3.5-41992-8.1 Ophthalmic Ointment Apply 1 application in both eyes nightly       CUSTOM MEDICATION Semaglutide/ cyanocobalamin    0.25 mg-   concentration: 1 /0.5 mg/ ML  Amount:  1 Ml vial  Instructions: inject 25 units/ 0.25 ML q weekly (Patient taking differently: Semaglutide/ cyanocobalamin- Wednesday    0.25 mg-   concentration: 1 /0.5 mg/ ML  Amount:  1 Ml vial  Instructions: inject 25 units/ 0.25 ML q weekly) 1 each 5 4/2/2025    polyethylene glycol, PEG 3350-KCl-NaBcb-NaCl-NaSulf, 236 g Oral Recon Soln Take 4,000 mL by mouth As Directed. Take 2,000 mL the night before your procedure and 2,000 mL the morning of your procedure. 1 each 0     Azelastine HCl 137 MCG/SPRAY Nasal Solution 2 sprays by Nasal route 2 (two) times daily. (Patient taking differently: 2 sprays by Nasal route 2 (two) times daily as needed.) 30 mL 5    [4]   Current Facility-Administered Medications Ordered in Epic   Medication Dose Route Frequency Provider Last Rate Last Admin    lactated ringers infusion   Intravenous Continuous Zachary Yung MD         No current [facility]-ordered outpatient medications on file.   [5]   Allergies  Allergen Reactions    Banana HIVES    Cantaloupe HIVES    Cephalosporins HIVES    Sulfamethoxazole HIVES    Trimethoprim HIVES    Milk-Related Compounds PAIN      Stomach pain, diarrhea    Dust Mite Extract ITCHING    Penicillins HIVES, RASH and UNKNOWN   [6]   Family History  Adopted: Yes   Family history unknown: Yes   [7]   Social History  Socioeconomic History    Marital status:    Tobacco Use    Smoking status: Former     Current packs/day: 0.00     Types: Cigarettes     Quit date: 1986     Years since quittin.7     Passive exposure: Past    Smokeless tobacco: Never    Tobacco comments:     I have not smoked in over 35 years   Vaping Use    Vaping status: Never Used   Substance and Sexual Activity    Alcohol use: Yes     Alcohol/week: 2.0 standard drinks of alcohol     Types: 2 Cans of beer per week     Comment: minimal consumption    Drug use: No   Other Topics Concern    Caffeine Concern Yes     Comment: Coffee, 1 cups daily; Tea;     Exercise Yes     Comment: walking, yoga    Grew up on a farm No    History of tanning Yes    Outdoor occupation No    Pt has a pacemaker No    Pt has a defibrillator No    Breast feeding No    Reaction to local anesthetic No    Left Handed No    Right Handed Yes    Currently spends a great deal of time in the sun No    Hx of Spending Great Deal of Time in Sun No    Bad sunburns in the past No    Tanning Salons in the Past No    Hx of Radiation Treatments Yes

## 2025-04-11 NOTE — ANESTHESIA POSTPROCEDURE EVALUATION
Patient: Mirta Miller    Procedure Summary       Date: 04/11/25 Room / Location: [facility] ENDOSCOPY 01 / [facility] ENDOSCOPY    Anesthesia Start: 0932 Anesthesia Stop: 1000    Procedure: COLONOSCOPY Diagnosis:       Diverticulitis      (Polyps, diverticulosis, hemorrhoids)    Surgeons: Zachary Yung MD Anesthesiologist: Silvia Ellington CRNA    Anesthesia Type: MAC ASA Status: 3            Anesthesia Type: MAC    Vitals Value Taken Time   /84 04/11/25 10:00   Temp 36.0 04/11/25 10:00   Pulse 76 04/11/25 10:00   Resp 12 04/11/25 10:00   SpO2 98 04/11/25 10:00       [facility] AN Post Evaluation:   Patient Evaluated in floor  Patient Participation: complete - patient participated  Level of Consciousness: awake  Pain Score: 0  Pain Management: adequate  Airway Patency:patent  Yes    Nausea/Vomiting: none  Cardiovascular Status: acceptable  Respiratory Status: acceptable  Postoperative Hydration acceptable      Silvia Ellington CRNA  4/11/2025 10:00 AM

## 2025-04-11 NOTE — H&P
History & Physical Examination    Patient Name: Mirta Miller  MRN: [id number]  CSN: [id number]  YOB: 1960    Diagnosis: colorectal cancer screening, history of diverticulitis    Prescriptions Prior to Admission[1]  Current Hospital Medications[2]    Allergies: Allergies[3]    Past Medical History[4]  Past Surgical History[5]  Family History[6]  Social History     Tobacco Use    Smoking status: Former     Current packs/day: 0.00     Types: Cigarettes     Quit date: 1986     Years since quittin.7     Passive exposure: Past    Smokeless tobacco: Never    Tobacco comments:     I have not smoked in over 35 years   Substance Use Topics    Alcohol use: Yes     Alcohol/week: 2.0 standard drinks of alcohol     Types: 2 Cans of beer per week     Comment: minimal consumption       SYSTEM Check if Physical Exam is Normal If not normal, please explain:   HEENT [ X]    NECK  [ X]    HEART [ X]    LUNGS [ X]    ABDOMEN [ X]    EXTREMITIES [ X]      General:awake, cooperative, no acute distress  HEENT: EOMI, no scleral icterus, MMM; oral pharnyx is without exudates or lesions  Neck: no lymphadenopathy; thyroid is not enlarged and without nodules  CV: RRR  Resp: non-labored breathing  Abd: soft, non-tender, non-distended  Ext: no lower extremity swelling  Neuro: Alert, Oriented X 3  Skin: no rashes, bruises  Psych: normal affect  I have discussed the risks and benefits and alternatives of the procedure with the patient/family.  They understand and agreed to proceed with plan of care.   Zachary Yung MD  [facility] - Gastroenterology  2025  9:30 AM                   [1]   Facility-Administered Medications Prior to Admission   Medication Dose Route Frequency Provider Last Rate Last Admin    [COMPLETED] lidocaine (Xylocaine) 1 % injection  3 mL Intradermal Once MartinezMarcelina Y, DO   3 mL at 25 1135    [COMPLETED] sodium hyaluronate (DUROLANE) 60 mg  60 mg Intra-articular Once Martinez,  Marcelina Y, DO   60 mg at 04/02/25 1136     Medications Prior to Admission   Medication Sig Dispense Refill Last Dose/Taking    ALPRAZOLAM 0.5 MG Oral Tab Take 1 tablet (0.5 mg total) by mouth 2 (two) times daily as needed for Anxiety 60 tablet 0 Taking As Needed    losartan 50 MG Oral Tab Take 1 tablet (50 mg total) by mouth daily. 90 tablet 1 4/10/2025    metoprolol tartrate 50 MG Oral Tab Take 1 tablet (50 mg total) by mouth in the morning.   4/10/2025    rosuvastatin 10 MG Oral Tab nightly.   4/9/2025    pantoprazole 40 MG Oral Tab EC Take 1 tablet (40 mg total) by mouth daily.   Taking    Probiotic Product (ALIGN OR) Take by mouth.   Taking    neomycin-polymyxin-dexamethasone 3.5-25648-3.1 Ophthalmic Ointment Apply 1 application in both eyes nightly       CUSTOM MEDICATION Semaglutide/ cyanocobalamin    0.25 mg-   concentration: 1 /0.5 mg/ ML  Amount:  1 Ml vial  Instructions: inject 25 units/ 0.25 ML q weekly (Patient taking differently: Semaglutide/ cyanocobalamin- Wednesday    0.25 mg-   concentration: 1 /0.5 mg/ ML  Amount:  1 Ml vial  Instructions: inject 25 units/ 0.25 ML q weekly) 1 each 5 4/2/2025    polyethylene glycol, PEG 3350-KCl-NaBcb-NaCl-NaSulf, 236 g Oral Recon Soln Take 4,000 mL by mouth As Directed. Take 2,000 mL the night before your procedure and 2,000 mL the morning of your procedure. 1 each 0     Azelastine HCl 137 MCG/SPRAY Nasal Solution 2 sprays by Nasal route 2 (two) times daily. (Patient taking differently: 2 sprays by Nasal route 2 (two) times daily as needed.) 30 mL 5    [2]   Current Facility-Administered Medications   Medication Dose Route Frequency    lactated ringers infusion   Intravenous Continuous   [3]   Allergies  Allergen Reactions    Banana HIVES    Cantaloupe HIVES    Cephalosporins HIVES    Sulfamethoxazole HIVES    Trimethoprim HIVES    Milk-Related Compounds PAIN     Stomach pain, diarrhea    Dust Mite Extract ITCHING    Penicillins HIVES, RASH and UNKNOWN   [4]   Past  Medical History:   Actinic keratosis    Allergic rhinitis    Anesthesia complication    blood pressure bottomed out - 6-8 hrs after surgery. Revived on own when staff moved pt. after breast reduction    Anxiety    Anxiety state    Atypical nevus    right chest    Cancer (HCC)    Breast Cancer DX     Cardiomyopathy (HCC)    due to covid 2022    Chronic rhinitis    Cough variant asthma (HCC)    Depression    Diverticulitis    Dyslipidemia    Environmental allergies    Esophageal reflux    Exposure to medical diagnostic radiation    High blood pressure    High cholesterol    History of adverse reaction to anesthesia    History of COVID-19    History of neuroma    \"Neuroma 3 rt\"    HTN (hypertension), benign    Left ventricular systolic dysfunction (LVSD)    Melanoma in situ (HCC)    Left chest    Metatarsalgia    \"modified insole / rx naprosyn\"    LAURY - oral device    Osteoarthritis    Personal history of antineoplastic chemotherapy    last 2019    PONV (postoperative nausea and vomiting)    Right knee injury    Arthroscopy    Sleep apnea    Visual impairment    glasses/contacts,dry eye   [5]   Past Surgical History:  Procedure Laterality Date          x 4    Colonoscopy  2013    per NG    Colonoscopy      2013    Hysteroscopy  2017    myosure polypectomy    Knee arthroscopy Right     x3 last     [facility] localization wire 1 site left (cpt=19281)      Maximus localization wire 1 site right (cpt=19281)      Other surgical history       arthroscopic surgery 3 times right knee meniscal tear    Radiation Bilateral 04/15/2019    recent radiation tx for breast cancer    Reduction left  2014    Reduction of large breast Bilateral 2014    Reduction right  2014   [6]   Family History  Adopted: Yes   Family history unknown: Yes

## 2025-04-11 NOTE — OPERATIVE REPORT
Colonoscopy Report    Mirta Miller     1960 Age 64 year old   PCP Elliot Watson MD Endoscopist Zachary Yung MD     Date of procedure: 25    Procedure: Colonoscopy w/ biopsy    Pre-operative diagnosis: colorectal cancer screening, history of diverticulitis    Post-operative diagnosis: see impression    Sedation: monitored anesthesia care (MAC)    Consent: We discussed the risks/benefits and alternatives to this procedure, as well as the planned sedation. Informed consent was obtained from the patient after the risks of the procedure were discussed, including but not limited to bleeding, perforation, aspiration, infection, or possibility of a missed lesion as well as the risks of anesthesia including but not limited to cardiopulmonary complications. The patient signed informed consent and elected to proceed with Colonoscopy with intervention [i.e. Biopsy, control of bleeding, dilatation, polypectomy, endoscopic mucosal resection, etc.] as indicated.    Colonoscopy procedure: Once an adequate level of sedation was obtained a digital rectal exam was completed revealing normal tone and no masses palpated. Then the lubricated tip of the [id number] diagnostic video colonoscope was carefully inserted and advanced without difficulty to the cecum using the air insufflation technique (only Co2 was used for insufflation). The cecum was identified by localizing the trifold, the appendix and the ileocecal valve. Withdrawal was begun with thorough washing and careful examination of the colonic walls and folds. The ascending colon was viewed twice in the forward view. Photodocumentation was obtained. The bowel prep was good. Views of the colon were good with washing. Withdrawal time was 14 minutes.    Air was then withdrawn and the endoscope was removed. The patient tolerated the procedure well. There were no immediate postoperative complications. The patient’s vital signs were monitored  throughout the procedure and remained stable.    Estimated blood loss: insignificant    Specimens collected:  colon polyps    Complications: none     Colonoscopy findings:  Terminal ileum: normal mucosa    Cecum: normal mucosa and vascular pattern    Ascending colon: There was a 3 to 4 mm polyp near the hepatic flexure removed by cold biopsy forceps.  Otherwise, normal mucosa and vascular pattern    Transverse colon: There was a 3 mm polyp removed by cold biopsy forceps.  Otherwise, normal mucosa and vascular pattern    Descending colon: normal mucosa and vascular pattern    Sigmoid colon: There was mild diverticulosis.  Otherwise, normal mucosa and vascular pattern    Rectum: retroflexed view showed very small non-bleeding hemorrhoids. Otherwise, normal mucosa and vascular pattern.    Impression:  1. Two diminutive colon polyps removed  2. Mild sigmoid colon diverticulosis  3. Very small hemorrhoids  4. Otherwise, unremarkable colonoscopy    Recommend:  1. Await pathology.   2. Repeat colonoscopy interval pending final pathology results. If new signs or symptoms develop, procedure may need to be repeated sooner.   3. Continue your current medications  4. Increase fiber in the diet  5. Follow up with your primary care physician on a routine basis    >>>If biopsies were performed and you have not received your pathology results either by phone or letter within 2 weeks, please call our office at [phone].    MD Ezio [facility] - Gastroenterology  4/11/2025

## 2025-04-11 NOTE — DISCHARGE INSTRUCTIONS
Home Care Instructions for Colonoscopy with Sedation    Diet:  - Resume your regular diet as tolerated unless otherwise instructed.  - Start with light meals to minimize bloating.  - Do not drink alcohol today.    Medication:  - If you have questions about resuming your normal medications, please contact your Primary Care Physician.    Activities:  - Take it easy today. Do not return to work today.  - Do not drive today.  - Do not operate any machinery today (including kitchen equipment).    Colonoscopy:  - You may notice some rectal \"spotting\" (a little blood on the toilet tissue) for a day or two after the exam. This is normal.  - If you experience any rectal bleeding (not spotting), persistent tenderness or sharp severe abdominal pains, oral temperature over 100 degrees Fahrenheit, light-headedness or dizziness, or any other problems, contact your doctor.    **If unable to reach your doctor, please go to the [facility]**    - Your referring physician will receive a full report of your examination.  - If you do not hear from your doctor's office within two weeks of your biopsy, please call them for your results.    You may be able to see your laboratory results in [email or web address] between 4 and 7 business days.  In some cases, your physician may not have viewed the results before they are released to [email or web address].  If you have questions regarding your results contact the physician who ordered the test/exam by phone or via [email or web address] by choosing \"Ask a Medical Question.\"

## 2025-04-17 ENCOUNTER — TELEPHONE (OUTPATIENT)
Facility: CLINIC | Age: 65
End: 2025-04-17

## 2025-04-17 NOTE — TELEPHONE ENCOUNTER
----- Message from Zachary Yung sent at 4/16/2025 12:52 PM CDT -----  GI staff: please place recall for colonoscopy in 7 years     Thanks    Zachary Yung MD  [facility] - Gastroenterology  4/16/2025  12:50 PM    ----- Message -----  From: Lab, Background User  Sent: 4/11/2025   3:54 PM CDT  To: Zachary Yung MD

## 2025-04-17 NOTE — TELEPHONE ENCOUNTER
Recall colonoscopy in 7 years per Dr. Yung.     Last done 4/11/2025.     Recall entered into patient outreach for 4/11/2032.     Health maintenance updated.

## 2025-04-28 DIAGNOSIS — E66.3 OVERWEIGHT (BMI 25.0-29.9): Primary | ICD-10-CM

## 2025-06-17 ENCOUNTER — MED REC SCAN ONLY (OUTPATIENT)
Dept: FAMILY MEDICINE CLINIC | Facility: CLINIC | Age: 65
End: 2025-06-17

## 2025-06-23 ENCOUNTER — OFFICE VISIT (OUTPATIENT)
Dept: DERMATOLOGY CLINIC | Facility: CLINIC | Age: 65
End: 2025-06-23

## 2025-06-23 DIAGNOSIS — L82.1 SEBORRHEIC KERATOSES: ICD-10-CM

## 2025-06-23 DIAGNOSIS — Z87.898 HISTORY OF ATYPICAL NEVUS: Primary | ICD-10-CM

## 2025-06-23 DIAGNOSIS — Z85.820 ENCOUNTER FOR FOLLOW-UP SURVEILLANCE OF MELANOMA: ICD-10-CM

## 2025-06-23 DIAGNOSIS — L57.0 AK (ACTINIC KERATOSIS): ICD-10-CM

## 2025-06-23 DIAGNOSIS — Z13.89 ENCOUNTER FOR SURVEILLANCE OF ABNORMAL NEVI: ICD-10-CM

## 2025-06-23 DIAGNOSIS — Z51.81 MEDICATION MONITORING ENCOUNTER: ICD-10-CM

## 2025-06-23 DIAGNOSIS — Z08 ENCOUNTER FOR FOLLOW-UP SURVEILLANCE OF MELANOMA: ICD-10-CM

## 2025-06-23 DIAGNOSIS — D22.9 MULTIPLE NEVI: ICD-10-CM

## 2025-06-23 DIAGNOSIS — D23.9 BENIGN NEOPLASM OF SKIN, UNSPECIFIED LOCATION: ICD-10-CM

## 2025-06-23 DIAGNOSIS — L81.4 LENTIGO: ICD-10-CM

## 2025-06-23 PROCEDURE — 99214 OFFICE O/P EST MOD 30 MIN: CPT | Performed by: DERMATOLOGY

## 2025-06-23 PROCEDURE — G2211 COMPLEX E/M VISIT ADD ON: HCPCS | Performed by: DERMATOLOGY

## 2025-06-23 RX ORDER — PREDNISOLONE ACETATE 10 MG/ML
1 SUSPENSION/ DROPS OPHTHALMIC EVERY 12 HOURS
COMMUNITY
Start: 2025-06-19

## 2025-06-23 NOTE — PROGRESS NOTES
The following individual(s) verbally consented to be recorded using ambient AI listening technology and understand that they can each withdraw their consent to this listening technology at any point by asking the clinician to turn off or pause the recording:    Patient name: Mirta Miller  Additional names:

## 2025-06-24 NOTE — PROGRESS NOTES
Mirta Miller is a 64 year old female.  HPI:     CC:    Chief Complaint   Patient presents with    Melanoma     LOV 25-Pt w/ Hx of Ak's and melanoma, presents for f/u and skin check.  Pt discontinued  Rx due to eye infection since 2024.  Would prefer to have spots near R eye treated with cryotherapy.         Allergies:  Banana, Cantaloupe, Cephalosporins, Sulfamethoxazole, Trimethoprim, Milk-related compounds, Dust mite extract, and Penicillins    HISTORY:    Past Medical History:    Actinic keratosis    Allergic rhinitis    Anesthesia complication    blood pressure bottomed out - 6-8 hrs after surgery. Revived on own when staff moved pt. after breast reduction    Anxiety    Anxiety state    Atypical nevus    right chest    Cancer (HCC)    Breast Cancer DX     Cardiomyopathy (HCC)    due to covid 2022    Chronic rhinitis    Colon adenomas    x2    Cough variant asthma (HCC)    Depression    Diverticulitis    Dyslipidemia    Environmental allergies    Esophageal reflux    Exposure to medical diagnostic radiation    High blood pressure    High cholesterol    History of adverse reaction to anesthesia    History of COVID-19    History of neuroma    \"Neuroma 3 rt\"    HTN (hypertension), benign    Left ventricular systolic dysfunction (LVSD)    Melanoma in situ (HCC)    Left chest    Metatarsalgia    \"modified insole / rx naprosyn\"    LAURY - oral device    Osteoarthritis    Personal history of antineoplastic chemotherapy    last 2019    PONV (postoperative nausea and vomiting)    Right knee injury    Arthroscopy    Sleep apnea    Visual impairment    glasses/contacts,dry eye      Past Surgical History:   Procedure Laterality Date          x 4    Colonoscopy  2013    per NG    Colonoscopy          Colonoscopy  2025    Dr. Yung- polypectomy    Colonoscopy N/A 2025    Procedure: COLONOSCOPY;  Surgeon: Zachary Yung MD;  Location: [facility] ENDOSCOPY    Hysteroscopy   2017    myosure polypectomy    Knee arthroscopy Right     x3 last 2013    Maximus localization wire 1 site left (cpt=19281)      Maximus localization wire 1 site right (cpt=19281)  2009    Other surgical history       arthroscopic surgery 3 times right knee meniscal tear    Radiation Bilateral 04/15/2019    recent radiation tx for breast cancer    Reduction left  2014    Reduction of large breast Bilateral 2014    Reduction right  2014      Family History   Adopted: Yes   Family history unknown: Yes      Social History     Socioeconomic History    Marital status:    Tobacco Use    Smoking status: Former     Current packs/day: 0.00     Types: Cigarettes     Quit date: 1986     Years since quittin.9     Passive exposure: Past    Smokeless tobacco: Never    Tobacco comments:     I have not smoked in over 35 years   Vaping Use    Vaping status: Never Used   Substance and Sexual Activity    Alcohol use: Yes     Alcohol/week: 2.0 standard drinks of alcohol     Types: 2 Cans of beer per week     Comment: minimal consumption    Drug use: No   Other Topics Concern    Caffeine Concern Yes     Comment: Coffee, 1 cups daily; Tea;     Exercise Yes     Comment: walking, yoga    Grew up on a farm No    History of tanning Yes    Outdoor occupation No    Pt has a pacemaker No    Pt has a defibrillator No    Breast feeding No    Reaction to local anesthetic No    Left Handed No    Right Handed Yes    Currently spends a great deal of time in the sun No    Hx of Spending Great Deal of Time in Sun No    Bad sunburns in the past No    Tanning Salons in the Past No    Hx of Radiation Treatments Yes   Social History Narrative    The patient does not use an assistive device..      The patient does live in a home with stairs.     Social Drivers of Health      Received from [facility]    Housing Stability        Current Outpatient Medications   Medication Sig Dispense Refill    prednisoLONE 1 %  Ophthalmic Suspension Place 1 drop into the right eye in the morning and 1 drop in the evening.      ALPRAZolam 0.5 MG Oral Tab Take 1 tablet (0.5 mg total) by mouth 2 (two) times daily as needed for Anxiety. 60 tablet 0    CUSTOM MEDICATION Semaglutide/ cyanocobalamin    0.5 mg-   Concentration: 1/0.5 mg/ML   Amount: 2.5 ML vial  Instructions: Inject 50 units/ 0.5 ML q weekly 1 each 5    losartan 50 MG Oral Tab Take 1 tablet (50 mg total) by mouth daily. 90 tablet 1    neomycin-polymyxin-dexamethasone 3.5-71584-1.1 Ophthalmic Ointment Apply 1 application in both eyes nightly      polyethylene glycol, PEG 3350-KCl-NaBcb-NaCl-NaSulf, 236 g Oral Recon Soln Take 4,000 mL by mouth As Directed. Take 2,000 mL the night before your procedure and 2,000 mL the morning of your procedure. 1 each 0    rosuvastatin 10 MG Oral Tab nightly.      pantoprazole 40 MG Oral Tab EC Take 1 tablet (40 mg total) by mouth daily.      Probiotic Product (ALIGN OR) Take by mouth.      metoprolol tartrate 50 MG Oral Tab Take 1 tablet (50 mg total) by mouth in the morning.      Azelastine HCl 137 MCG/SPRAY Nasal Solution 2 sprays by Nasal route 2 (two) times daily. (Patient taking differently: 2 sprays by Nasal route 2 (two) times daily as needed.) 30 mL 5     Allergies:   Allergies   Allergen Reactions    Banana HIVES    Cantaloupe HIVES    Cephalosporins HIVES    Sulfamethoxazole HIVES    Trimethoprim HIVES    Milk-Related Compounds PAIN     Stomach pain, diarrhea    Dust Mite Extract ITCHING    Penicillins HIVES, RASH and UNKNOWN       Past Medical History:    Actinic keratosis    Allergic rhinitis    Anesthesia complication    blood pressure bottomed out - 6-8 hrs after surgery. Revived on own when staff moved pt. after breast reduction    Anxiety    Anxiety state    Atypical nevus    right chest    Cancer (HCC)    Breast Cancer DX 12/18    Cardiomyopathy (HCC)    due to covid 5/2022    Chronic rhinitis    Colon adenomas    x2    Cough  variant asthma (HCC)    Depression    Diverticulitis    Dyslipidemia    Environmental allergies    Esophageal reflux    Exposure to medical diagnostic radiation    High blood pressure    High cholesterol    History of adverse reaction to anesthesia    History of COVID-19    History of neuroma    \"Neuroma 3 rt\"    HTN (hypertension), benign    Left ventricular systolic dysfunction (LVSD)    Melanoma in situ (HCC)    Left chest    Metatarsalgia    \"modified insole / rx naprosyn\"    LAURY - oral device    Osteoarthritis    Personal history of antineoplastic chemotherapy    last 2019    PONV (postoperative nausea and vomiting)    Right knee injury    Arthroscopy    Sleep apnea    Visual impairment    glasses/contacts,dry eye     Past Surgical History:   Procedure Laterality Date          x 4    Colonoscopy  2013    per NG    Colonoscopy          Colonoscopy  2025    Dr. Yung- polypectomy    Colonoscopy N/A 2025    Procedure: COLONOSCOPY;  Surgeon: Zachary Yung MD;  Location: [facility] ENDOSCOPY    Hysteroscopy  2017    myosure polypectomy    Knee arthroscopy Right     x3 last     Maximus localization wire 1 site left (cpt=19281)      Maximus localization wire 1 site right (cpt=19281)      Other surgical history       arthroscopic surgery 3 times right knee meniscal tear    Radiation Bilateral 04/15/2019    recent radiation tx for breast cancer    Reduction left  2014    Reduction of large breast Bilateral 2014    Reduction right  2014     Social History     Socioeconomic History    Marital status:      Spouse name: Not on file    Number of children: Not on file    Years of education: Not on file    Highest education level: Not on file   Occupational History    Not on file   Tobacco Use    Smoking status: Former     Current packs/day: 0.00     Types: Cigarettes     Quit date: 1986     Years since quittin.9     Passive exposure: Past    Smokeless tobacco:  Never    Tobacco comments:     I have not smoked in over 35 years   Vaping Use    Vaping status: Never Used   Substance and Sexual Activity    Alcohol use: Yes     Alcohol/week: 2.0 standard drinks of alcohol     Types: 2 Cans of beer per week     Comment: minimal consumption    Drug use: No    Sexual activity: Not on file   Other Topics Concern     Service Not Asked    Blood Transfusions Not Asked    Caffeine Concern Yes     Comment: Coffee, 1 cups daily; Tea;     Occupational Exposure Not Asked    Hobby Hazards Not Asked    Sleep Concern Not Asked    Stress Concern Not Asked    Weight Concern Not Asked    Special Diet Not Asked    Back Care Not Asked    Exercise Yes     Comment: walking, yoga    Bike Helmet Not Asked    Seat Belt Not Asked    Self-Exams Not Asked    Grew up on a farm No    History of tanning Yes    Outdoor occupation No    Pt has a pacemaker No    Pt has a defibrillator No    Breast feeding No    Reaction to local anesthetic No    Left Handed No    Right Handed Yes    Currently spends a great deal of time in the sun No    Past Sunlamp Treatments for Acne Not Asked    Hx of Spending Great Deal of Time in Sun No    Bad sunburns in the past No    Tanning Salons in the Past No    Hx of Radiation Treatments Yes    Regular use of sun block Not Asked   Social History Narrative    The patient does not use an assistive device..      The patient does live in a home with stairs.     Social Drivers of Health     Food Insecurity: Not on file   Transportation Needs: Not on file   Stress: Not on file   Housing Stability: Low Risk  (1/26/2023)    Received from [facility]    Housing Stability     Mortgage Payment Concerns?: Not on file     Number of Places Lived in the Last Year: Not on file     Unstable Housing?: Not on file     Family History   Adopted: Yes   Family history unknown: Yes       There were no vitals filed for this visit.    HPI:    Chief Complaint   Patient presents with     Melanoma     LOV 2/05/25-Pt w/ Hx of Ak's and melanoma, presents for f/u and skin check.  Pt discontinued  Rx due to eye infection since 12/2024.  Would prefer to have spots near R eye treated with cryotherapy.     Follow-up AK's history of melanoma atypical nevi history of breast cancer with radiation, for follow-up    Patient adopted unknown family history  Patient here for follow-up history melanoma in situ at left chest post wide excision  Concern with lesion at lateral orbit  No prior Personal history of skin cancer or atypical lesions removed.  Lots of sun exposure in the past.  Using Vichy product helping.  Overall doing well careful sun protection.      History of Present Illness    Mirta Miller is a 64 year old female who presents with concerns about skin lesions and dry eyes.    She has blackheads and small white inclusion cysts, which she attributes to sun damage and dry skin. She uses Eucerin with SPF daily to protect her skin, even when not going outside.    She experiences dry eyes, which were previously red and watery. Her eyes have improved significantly and are no longer red. She has been using adapalene, which she believes has helped alleviate her symptoms.    She recently traveled to [place] and [facility], which involved long flights that were challenging due to her 's difficulty with travel. She mentions her son is planning a destination wedding, which she is concerned about due to the travel involved.        Concerns as above    Patient presents with concerns above.    Patient has been in their usual state of health.  History, medications, allergies reviewed as noted.      ROS:  Denies any other systemic complaints.  No new or changeing lesions other than noted above. No fevers, chills, night sweats, unusual sun sensitivity.  No other skin complaints.        History, medications, allergies reviewed as noted.       Physical Examination:     Well-developed well-nourished patient alert  oriented in no acute distress.  Exam total-body performed, including scalp, head, neck, face,nails, hair, external eyes, including conjunctival mucosa, eyelids, lips external ears, back, chest,/ breasts, axillae,  abdomen, arms, legs, palms.     Multiple light to medium brown, well marginated, uniformly pigmented, macules and papules 6 mm and less scattered on exam. pigmented lesions examined with dermoscopy benign-appearing patterns.     Waxy tannish keratotic papules scattered, cherry-red vascular papules scattered.    See map today's date for lesions noted .      Otherwise remarkable for lesions as noted on map.  See details of examination  See Assessment /Plan for additional history and physical exam also:    Assessment / plan:    No orders of the defined types were placed in this encounter.      Meds & Refills for this Visit:  Requested Prescriptions      No prescriptions requested or ordered in this encounter         No diagnosis found.      See details on map.      Remarkable for:      Patient seen for follow-up long-term monitoring, treatment of  Actinic keratoses, abnormal nevi, sun damage, history of melanoma, history of breast cancer with radiation, atypical nevi  Requiring ongoing surveillance, monitoring.  See previous notes.  Patient requiring frequent monitoring, long-term surveillance for above diagnoses, office visits every 3 to 4 months, self monitoring and follow-up as needed in the interim.  Plan is continued surveillance as noted and ongoing education  There is a longitudinal care relationship with me, the care plan reflects the ongoing nature of the continuous relationship of care, and the medical record indicates that there is ongoing treatment of a serious/complex medical condition which I am currently managing.  is Applicable    Assessment & Plan      Seborrheic Keratosis  Presents with crusty, moist, and hard lesions consistent with seborrheic keratosis, a benign skin growth. Discussed  that these lesions are non-cancerous and typically do not require treatment unless symptomatic. Topical treatments can manage symptoms.  - Provide gel for topical application    General Health Maintenance  Concern about scheduled CT with dye for heart evaluation related to previous colon issue. Discussed necessity of CT scan for accurate heart condition assessment and potential risks of dye, including allergic reactions and kidney issues.  - Proceed with scheduled CT with dye for heart evaluation.    6/25   Lesion extracted at right lateral orbit dark lesion.  Given history melanoma patient concern regarding this has not responded to adapalene has stopped this due to irritation in the eye area small comedones likely due to sun damage, irritation, and dryness. The condition is well-managed and not causing significant concern. She uses Eucerin with SPF daily for sun protection, which is effective in preventing further sun damage.  - Continue Eucerin with SPF daily for sun protection.  - Schedule follow-up appointment for the end of November.    Recording duration: 7 minutes        right chest, shave biopsy: of lesion 2/25  -Atypical junctional lentiginous nevus with mild melanocytic dysplasia.  -The atypical nevus appears excised in the examined planes of section      Prior AK upper lip has improved post fluorouracil continue monitoring if scaly may resume fluorouracil as needed    No active AK's currently history of fluorouracil use.  Has noted significant improvement actinic Keratoses.  Precancerous nature discussed. Sun protection, sunscreen/ blocks encouraged .  Monitoring for new lesions.  Sun damage additional recurrent and new actinic keratoses, skin cancers may occur in areas of prior actinic keratoses, related to past sun exposure to minimize current sun exposure.  Sunscreen applied consistently regularly, reapplication and sun protection while driving recommended.  High risk patient continue careful  monitoring    No new suspicious lesions otherwise exam unchanged    otherwise no new atypical appearing lesions  -pt with history of melanoma, at high risk for nonmelanoma skin cancers, history of breast cancer active actinic keratoses.  Extensive sun damage, numerous pigmented lesions    Lesion left ear, shoulder benign keratoses reassurance.  No atypical features   Exam otherwise unchanged  right mid back, shave biopsy:  -Compound lentiginous nevus10/22 no recurrence     left chest melanoma in situ6/22  Patient post wide excision melanoma in situ healed well.  Continue careful sun protection regular follow-up      Lesion at right lateral orbit tan patch consistent with lentigo recommend observation.  Lumbar back lesions benign keratoses more lesions however noted previously-unchanged.  Continue monitoring    Remaining pigmented lesions without evidence of atypical changes no significant changes in exam.         plan follow-up for full skin exam in 3 to 4 months    Waxy tan keratotic papules lesions in areas of concern as noted reassurance given.  Benign nature discussed.  Possibility of cryo, alphahydroxy acids over-the-counter retinol's discussed.    Lentigo left lateral thigh observe.    Moderate sun damage with extensive lentigines keratoses.  Given history of breast cancer, sun exposure at least annual follow-up in future.  Patient adopted family history unknown careful sun protection, further plans pending pathology  Patient at high risk for both melanoma and nonmelanoma skin cancers new lesions.  Continue careful monitoring every 4 months.    History of verrucoid keratosis right forearm no recurrence    Waxy tan keratotic papule left temple reassurance regarding benign seborrheic keratosis reassurance given    Scattered keratoses back chest neck shoulders no suspicious lesions multiple lentigines reassurance continue sun protection    Few milia.  Most prominent tong-nasally, cheeks and forehead on  examination multiple whitish dome-shaped smooth papules are noted.  Milia diagnosis , pathophysiology discussed.  Over-the-counter retinol products may cause peeling irritation.  Differin gel over-the-counter consider prescription Retin-A if worsening.  No new suspicious lesions    Multiple nevi no significant changes.  Continue regular skin checks.  Follow-up 1 year or as needed    Please refer to map for specific lesions.  See additional diagnoses.  Pros cons of various therapies, risks benefits discussed.Pathophysiology discussed with patient.  Therapeutic options reviewed.  See  Medications in grid.  Instructions reviewed at length.    Benign nevi, seborrheic  keratoses, cherry angiomas:  Reassurance regarding other benign skin lesions.    Monitor for new or changing lesions. Signs and symptoms of skin cancer, ABCDE's of melanoma ( additional information available at AAD.org, skincancer.org) Encourage Sunscreen (broad-spectrum, ideally mineral-based-UVA/UVB -SPF 30 or higher) use encouraged, sun protection/sun protective clothing, self exams reviewed Followup as noted RTC ---routine checkup 6 mos -one year or p.r.n.    Encounter Times   Including precharting, reviewing chart, prior notes obtaining history: 10 minutes, medical exam :10 minutes, notes on body map, plan, counseling 10minutes My total time spent caring for the patient on the day of the encounter: 30 minutes     The patient indicates understanding of these issues and agrees to the plan.  The patient is asked to return as noted in follow-up/ above.    This note was generated using Dragon voice recognition software.  Please contact me regarding any confusion resulting from errors in recognition..  Note to patient and family: The 21st Century Cures Act makes medical notes like these available to patients. However, be advised this is a medical document. It is intended as [id number] communication and monitoring of a patient's care needs. It is written  in medical language and may contain abbreviations or verbiage that are unfamiliar. It may appear blunt or direct. Medical documents are intended to carry relevant information, facts as evident and the clinical opinion of the practitioner.

## 2025-08-07 ENCOUNTER — OFFICE VISIT (OUTPATIENT)
Dept: SURGERY | Facility: CLINIC | Age: 65
End: 2025-08-07

## 2025-08-07 VITALS
RESPIRATION RATE: 16 BRPM | DIASTOLIC BLOOD PRESSURE: 82 MMHG | OXYGEN SATURATION: 97 % | WEIGHT: 185 LBS | BODY MASS INDEX: 28.04 KG/M2 | SYSTOLIC BLOOD PRESSURE: 120 MMHG | HEIGHT: 68 IN | HEART RATE: 78 BPM

## 2025-08-07 DIAGNOSIS — G47.33 OSA ON CPAP: ICD-10-CM

## 2025-08-07 DIAGNOSIS — E78.5 DYSLIPIDEMIA: ICD-10-CM

## 2025-08-07 DIAGNOSIS — Z51.81 ENCOUNTER FOR THERAPEUTIC DRUG MONITORING: ICD-10-CM

## 2025-08-07 DIAGNOSIS — E66.3 OVERWEIGHT (BMI 25.0-29.9): ICD-10-CM

## 2025-08-07 DIAGNOSIS — I10 HTN (HYPERTENSION), BENIGN: Primary | ICD-10-CM

## 2025-08-07 PROCEDURE — 99214 OFFICE O/P EST MOD 30 MIN: CPT | Performed by: INTERNAL MEDICINE

## 2025-08-14 ENCOUNTER — LAB ENCOUNTER (OUTPATIENT)
Dept: LAB | Age: 65
End: 2025-08-14
Attending: NURSE PRACTITIONER

## 2025-08-14 DIAGNOSIS — I51.9 LEFT VENTRICULAR DYSFUNCTION: ICD-10-CM

## 2025-08-14 DIAGNOSIS — I42.8 OTHER CARDIOMYOPATHIES (HCC): Primary | ICD-10-CM

## 2025-08-14 DIAGNOSIS — E78.00 PURE HYPERCHOLESTEROLEMIA: ICD-10-CM

## 2025-08-14 LAB
ALT SERPL-CCNC: 22 U/L (ref 10–49)
AST SERPL-CCNC: 26 U/L (ref ?–34)
CHOLEST SERPL-MCNC: 166 MG/DL (ref ?–200)
FASTING PATIENT LIPID ANSWER: YES
HDLC SERPL-MCNC: 72 MG/DL (ref 40–59)
LDLC SERPL CALC-MCNC: 75 MG/DL (ref ?–100)
NONHDLC SERPL-MCNC: 94 MG/DL (ref ?–130)
TRIGL SERPL-MCNC: 107 MG/DL (ref 30–149)
VLDLC SERPL CALC-MCNC: 16 MG/DL (ref 0–30)

## 2025-08-14 PROCEDURE — 84450 TRANSFERASE (AST) (SGOT): CPT

## 2025-08-14 PROCEDURE — 36415 COLL VENOUS BLD VENIPUNCTURE: CPT

## 2025-08-14 PROCEDURE — 84460 ALANINE AMINO (ALT) (SGPT): CPT

## 2025-08-14 PROCEDURE — 80061 LIPID PANEL: CPT

## 2025-08-19 ENCOUNTER — PATIENT MESSAGE (OUTPATIENT)
Dept: SURGERY | Facility: CLINIC | Age: 65
End: 2025-08-19

## 2025-08-21 DIAGNOSIS — G47.33 OSA ON CPAP: Primary | ICD-10-CM

## 2025-08-25 DIAGNOSIS — G47.33 OSA ON CPAP: Primary | ICD-10-CM

## 2025-08-25 RX ORDER — TIRZEPATIDE 2.5 MG/.5ML
2.5 INJECTION, SOLUTION SUBCUTANEOUS WEEKLY
Qty: 3 ML | Refills: 2 | Status: SHIPPED | OUTPATIENT
Start: 2025-08-25

## (undated) DIAGNOSIS — G47.33 OSA (OBSTRUCTIVE SLEEP APNEA): Primary | ICD-10-CM

## (undated) DEVICE — TOWEL SURG OR 17X30IN BLUE

## (undated) DEVICE — FLEXIBLE YANKAUER,MEDIUM TIP, NO VACUUM CONTROL: Brand: ARGYLE

## (undated) DEVICE — SUT SILK 2-0 SH K833H

## (undated) DEVICE — 60 ML SYRINGE LUER-LOCK TIP: Brand: MONOJECT

## (undated) DEVICE — GAMMEX® PI HYBRID SIZE 7, STERILE POWDER-FREE SURGICAL GLOVE, POLYISOPRENE AND NEOPRENE BLEND: Brand: GAMMEX

## (undated) DEVICE — SUT PROLENE 2-0 SH 8833H

## (undated) DEVICE — KIT ENDO ORCAPOD 160/180/190

## (undated) DEVICE — SYRINGE MNJCT 10ML LF STRL REG

## (undated) DEVICE — PEN: MARKING STD PT 100/CS: Brand: MEDICAL ACTION INDUSTRIES

## (undated) DEVICE — UNDYED BRAIDED (POLYGLACTIN 910), SYNTHETIC ABSORBABLE SUTURE: Brand: COATED VICRYL

## (undated) DEVICE — STANDARD HYPODERMIC NEEDLE,POLYPROPYLENE HUB: Brand: MONOJECT

## (undated) DEVICE — MEDI-VAC NON-CONDUCTIVE SUCTION TUBING 6MM X 1.8M (6FT.) L: Brand: CARDINAL HEALTH

## (undated) DEVICE — SUT VICRYL 3-0 SH J416H

## (undated) DEVICE — DEVICE SPEC RTRVL MYOSURE

## (undated) DEVICE — DRAPE SHEET LARGE 76X55

## (undated) DEVICE — SUT ETHIBOND 2-0 SH X833H

## (undated) DEVICE — DRAPE SHEET LAPAROTOMY

## (undated) DEVICE — KIT CLEAN ENDOKIT 1.1OZ GOWNX2

## (undated) DEVICE — 60 ML SYRINGE REGULAR TIP: Brand: MONOJECT

## (undated) DEVICE — MINOR GENERAL: Brand: MEDLINE INDUSTRIES, INC.

## (undated) DEVICE — SUT PDS II 3-0 SH Z316H

## (undated) DEVICE — Device

## (undated) DEVICE — MEGADYNE E-Z CLEAN BLADE 2.75"

## (undated) DEVICE — MYOSURE SINGLE USE SEAL SET

## (undated) DEVICE — SOCK CNSTR 4IN TNPSL UNV SPEC

## (undated) DEVICE — SUT ETHILON 3-0 PS-2 1669H

## (undated) DEVICE — HYSTEROSCOPY: Brand: MEDLINE INDUSTRIES, INC.

## (undated) DEVICE — SET TUBI Y FL CNTRL INFL/OTFL

## (undated) DEVICE — SOL  .9 3000ML

## (undated) DEVICE — GIJAW SINGLE-USE BIOPSY FORCEPS WITH NEEDLE: Brand: GIJAW

## (undated) DEVICE — SOLUTION  .9 1000ML BTL

## (undated) DEVICE — V2 SPECIMEN COLLECTION MANIFOLD KIT: Brand: NEPTUNE

## (undated) DEVICE — STERILE LATEX POWDER-FREE SURGICAL GLOVESWITH NITRILE COATING: Brand: PROTEXIS

## (undated) DEVICE — SUT VICRYL 4-0 P-3 J494G

## (undated) NOTE — LETTER
Date: Jesenia 10, 2024      Patient Name: Mirta Miller      : 1960        Thank you for choosing [facility] as your health care provider. Your physician has deemed the following medical service(s) necessary. However, your insurance plan may not pay for all of your health care and costs and may deny payment for this service. The fact that your insurance plan does not pay for an item or service does not mean you should not receive it. The purpose of this form is to help you make an informed decision about whether or not you want to receive this service(s) that may not be paid for by your insurance plan.    CPT Code Description     Cost     Right knee aspiration and injection with Hyaluronic Acid with Gel One      I understand that the above mentioned service(s) or supply may not be covered by my insurance company. I agree to be financially responsible for the cost of this service or supply in the event of my insurance denies payment as a non-covered benefit.        ______________________________________________________________________  Signature of Patient or Patient's Representative  Relationship  Date    ______________________________________________________________________  Signature of Witness to signing of form   Printed Name

## (undated) NOTE — LETTER
20      Patient: Nerissa Sosa  : 1960 Visit date: 2020    Dear Harshil Martinez,      I examined your patient in follow-up today.     She is 19 days post right middle finger crush injury with nondisplaced distal phalangeal co

## (undated) NOTE — MR AVS SNAPSHOT
[facility] [address]  [phone]               Thank you for choosing us for your health care visit with Doug Pineda DPM.  We are glad to serve you and happy to provide you with this summary of yo Take 1 tablet by mouth every 6 (six) hours as needed for Anxiety. take 1 tablet (0.5MG)  by ORAL route  every 6 hours for anxiety   Commonly known as:  ATIVAN           naproxen 500 MG Tabs   Take 500 mg by mouth 2 (two) times daily as needed.    Commonly k

## (undated) NOTE — Clinical Note
Thanks for the referral.   She is very upset that her eldest son does not talk to her. I did recommend she follows up with her psychologist.  May benefit from [address]. I agree the cancer med probably caused weight gain. Will start Diethylpropion to help give her some energy in the day time. She loves to walk at the [employer]. Has good potential to lose weight. Tough situation.    Terence Lands

## (undated) NOTE — MR AVS SNAPSHOT
After Visit Summary   1/6/2023    Allie Rodriguez   MRN: [id number]           Visit Information     Date & Time  1/6/2023  1:40 PM Provider  Jesse Harry MD [address] [place] [address], [address] [place] [address],[address], [facility]/[employer]. Phone  [phone]      Your Vitals Were  Most recent update: 1/6/2023  1:40 PM    BP   129/82    Pulse   90    LMP    (LMP Unknown)           Allergies as of 1/6/2023  Review status set to Review Complete on 1/6/2023       Noted Reaction Type Reactions    Banana 12/24/2018    HIVES    Cantaloupe 12/24/2018    HIVES    Cephalosporins 07/21/2014    HIVES    Sulfamethoxazole 07/21/2014    HIVES    Trimethoprim 07/21/2014    HIVES    Milk-related Compounds 12/24/2018    PAIN    Stomach pain, diarrhea    Dust Mite Extract 01/19/2010    ITCHING    Penicillins 11/12/2009    HIVES, RASH      Your Current Medications        Dosage    ALPRAZOLAM 0.5 MG Oral Tab TAKE 1 TABLET (0.5MG TOTAL) BY MOUTH TWO TIMES DAILY AS NEEDED FOR ANXIETY    Azelastine HCl 137 MCG/SPRAY Nasal Solution 2 sprays by Nasal route 2 (two) times daily. PATADAY 0.7 % Ophthalmic Solution INSTILL 1 DROP INTO BOTH EYES ONCE A DAY AS NEEDED FOR ALLERGY RELIEF    montelukast 10 MG Oral Tab Take 1 tablet (10 mg total) by mouth nightly. anastrozole 1 MG Oral Tab tab Take 1 mg by mouth daily. Probiotic Product (ALIGN OR) Take by mouth.       Diagnoses for This Visit    Encounter for gynecological examination without abnormal finding   [120226]  -  Primary  Screening for malignant neoplasm of cervix   [674085]             We Ordered the Following     Normal Orders This Visit    HPV HIGH RISK , THIN PREP COLLECTION [OFP0305 CUSTOM]     THINPREP PAP SMEAR B [[id number] CUSTOM]     THINPREP PAP SMEAR ONLY [[id number] CUSTOM]     Future Labs/Procedures Expected by Expires    HPV HIGH RISK , THIN PREP COLLECTION [[id number] CUSTOM]  1/6/2023 1/6/2024    THINPREP PAP SMEAR B [[id number] CUSTOM]  1/6/2023 1/6/2024      Future Appointments        Provider Department    2/10/2023 9:00 AM [facility]DR X[facility] [address] [facility] [place]    2/10/2023 9:40 AM Shad, [address] [place] [address] Avenue    3/27/2023 11:00 AM Michelle Boswell, [address], [address], [place]    4/10/2023 4:00 PM [facility]    9/29/2023 9:15 AM Jane Watson 162                Did you know that [facility] primary care physicians now offer Video Visits through [address] for adult patients for a variety of conditions such as allergies, back pain and cold symptoms? Skip the drive and waiting room and online chat with a doctor face-to-face using your web-cam enabled computer or mobile device wherever you are. Video Visits cost $50 and can be paid hassle-free using a credit, debit, or health savings card. Not active on [employer]? Ask us how to get signed up today! If you receive a survey from [employer], please take a few minutes to complete it and provide feedback. We strive to deliver the best patient experience and are looking for ways to make improvements. Your feedback will help us do so. For more information on [employer], please visit [email or web address]           No text in SmartText           No text in SmartText

## (undated) NOTE — LETTER
AUTHORIZATION FOR SURGICAL OPERATION OR OTHER PROCEDURE    1.  I hereby authorize Dr. Cherylene League and the [facility] staff assigned to my case to perform the following operation and/or procedure at the [facility]:    Right knee aspiration and injection with Gel o Patient:           []  Parent    Responsible person                          []  Spouse  In case of minor or                    [] Other  _____________   Incompetent name:  __________________________________________________                               (p

## (undated) NOTE — LETTER
A. Notifier: [employer]   NAJMA. Patient Name: Danette Luis Identification Number: [id number]      Advance Beneficiary Notice of Noncoverage (ABN)  NOTE:  If Medicare doesn’t pay for D. Right Knee Aspiration and Injection with Synvisc One b see if Medicare would pay. H. Additional Information: This notice gives our opinion, not an official Medicare decision. If you have other questions on this notice or Medicare billing, call 1-800-MEDICARE (5-688.153.8804/[facility]: [phone]).   Sheri

## (undated) NOTE — LETTER
12/13/2018              Isaías Gee        [facility] [address]       To whom it may concern,      This is to certify that Ms Boubacar Quintanilla is our patient and under our care.  She was advised to stay off work starting D

## (undated) NOTE — LETTER
12/31/2018              Ailyn Bynum         Dear Johanna Cowden,      It was a pleasure to see you at our [facility] [place], [place] office.   Your pap and HPV neg, repeat

## (undated) NOTE — LETTER
[address], [place], [place]     AUTHORIZATION FOR SURGICAL OPERATION OR PROCEDURE    I hereby authorize Dr. Vivian Fregoso, my Physician(s) and whomever may be designated as the doctor's Assistant, to perform the following oper Physician. 4. I consent to the photographing of procedure(s) to be performed for the purposes of advancing medicine, science and/or education, provided my identity is not revealed.  If the procedure has been videotaped, the physician/surgeon will obtain th (Witness signature)                                                                                                  (Date)                                (Time)  STATEMENT OF PHYSICIAN My signature below affirms that prior to the time of the procedure;  I

## (undated) NOTE — LETTER
AUTHORIZATION FOR SURGICAL OPERATION OR OTHER PROCEDURE    1. I hereby authorize Dr. Evelyn Cantor and the [facility] staff assigned to my case to perform the following operation and/or procedure at the [facility]:   Right knee aspiration and injection with Gel-one   _______________________________________________________________________________________________      _______________________________________________________________________________________________    2. My physician has explained the nature and purpose of the operation or other procedure, possible alternative methods of treatment, the risks involved, and the possibility of complication to me. I acknowledge that no guarantee has been made as to the result that may be obtained. 3.  I recognize that, during the course of this operation, or other procedure, unforseen conditions may necessitate additional or different procedure than those listed above. I, therefore, further authorize and request that the above named physician, his/her physician assistants or designees perform such procedures as are, in his/her professional opinion, necessary and desirable. 4.  Any tissue or organs removed in the operation or other procedure may be disposed of by and at the discretion of the [facility] staff and [facility]. 5.  I understand that in the event of a medical emergency, I will be transported by local paramedics to [facility] or other hospital emergency department. 6.  I certify that I have read and fully understand the above consent to operation and/or other procedure. 7.  I acknowledge that my physician has explained sedation/analgesia administration to me including the risks and benefits. I consent to the administration of sedation/analgesia as may be necessary or desirable in the judgement of my physician.     Witness signature: ___________________________________________________ Date:  ______/______/_____ Time:  ________ A. M.  P.M. Patient Name:  Dennise Rayo                              8/26/1960                              [id number]    _____________________________________________________  (please print)       Patient signature:  ___________________________________________________        Statement of Physician  My signature below affirms that prior to the time of the procedure, I have explained to the patient and/or his/her guardian, the risks and benefits involved in the proposed treatment and any reasonable alternative to the proposed treatment. I have also explained the risks and benefits involved in the refusal of the proposed treatment and have answered the patient's questions.                         Date:  ______/______/_______  Provider                      Signature:  __________________________________________________________       Time:  ___________ A.M    P.M.

## (undated) NOTE — LETTER
AUTHORIZATION FOR SURGICAL OPERATION OR OTHER PROCEDURE    1. I hereby authorize Dr. Laura Price and the [facility] staff assigned to my case to perform the following operation and/or procedure at the [facility]:    Bilateral knee injections    2. My physician has explained the nature and purpose of the operation or other procedure, possible alternative methods of treatment, the risks involved, and the possibility of complication to me. I acknowledge that no guarantee has been made as to the result that may be obtained. 3.  I recognize that, during the course of this operation, or other procedure, unforseen conditions may necessitate additional or different procedure than those listed above. I, therefore, further authorize and request that the above named physician, his/her physician assistants or designees perform such procedures as are, in his/her professional opinion, necessary and desirable. 4.  Any tissue or organs removed in the operation or other procedure may be disposed of by and at the discretion of the [facility] staff and [facility]. 5.  I understand that in the event of a medical emergency, I will be transported by local paramedics to [facility] or other hospital emergency department. 6.  I certify that I have read and fully understand the above consent to operation and/or other procedure. 7.  I acknowledge that my physician has explained sedation/analgesia administration to me including the risks and benefits. I consent to the administration of sedation/analgesia as may be necessary or desirable in the judgement of my physician. Witness signature: ___________________________________________________ Date:  ______/______/_____                    Time:  ________ A. M.  P.M.        Patient Name: Tanika Edmond  8/26/1960  [id number]         Patient signature:  ___________________________________________________               Statement of Physician  My signature below affirms that prior to the time of the procedure, I have explained to the patient and/or his/her guardian, the risks and benefits involved in the proposed treatment and any reasonable alternative to the proposed treatment. I have also explained the risks and benefits involved in the refusal of the proposed treatment and have answered the patient's questions.                         Date:  ______/______/_______  Provider                      Signature:  __________________________________________________________       Time:  ___________ A.M    P.M.

## (undated) NOTE — LETTER
Date: August 15, 2023      Patient Name: Kacey Norwood      : 1960        Thank you for choosing [facility] as your health care provider. Your physician has deemed the following medical service(s) necessary. However, your insurance plan may not pay for all of your health care and costs and may deny payment for this service. The fact that your insurance plan does not pay for an item or service does not mean you should not receive it. The purpose of this form is to help you make an informed decision about whether or not you want to receive this service(s) that may not be paid for by your insurance plan. CPT Code Description     Cost     Bilateral knee injections  $2800.00      I understand that the above mentioned service(s) or supply may not be covered by my insurance company.  I agree to be financially responsible for the cost of this service or supply in the event of my insurance denies payment as a non-covered benefit.        ______________________________________________________________________  Signature of Patient or Patient's Representative  Relationship  Date    ______________________________________________________________________  Signature of Witness to signing of form   Printed Name

## (undated) NOTE — LETTER
[facility]  [address], [place], [place]    Authorization for Surgical Operation and Procedure                               I hereby authorize Zachary Yung MD, my physician and his/her assistants (if applicable), which may include medical students, residents, and/or fellows, to perform the following surgical operation/ procedure and administer such anesthesia as may be determined necessary by my physician: Operation/Procedure name (s) COLONOSCOPY on Mirta Miller   2.   I recognize that during the surgical operation/procedure, unforeseen conditions may necessitate additional or different procedures than those listed above.  I, therefore, further authorize and request that the above-named surgeon, assistants, or designees perform such procedures as are, in their judgment, necessary and desirable.    3.   My surgeon/physician has discussed prior to my surgery the potential benefits, risks and side effects of this procedure; the likelihood of achieving goals; and potential problems that might occur during recuperation.  They also discussed reasonable alternatives to the procedure, including risks, benefits, and side effects related to the alternatives and risks related to not receiving this procedure.  I have had all my questions answered and I acknowledge that no guarantee has been made as to the result that may be obtained.    4.   Should the need arise during my operation/procedure, which includes change of level of care prior to discharge, I also consent to the administration of blood and/or blood products.  Further, I understand that despite careful testing and screening of blood or blood products by collecting agencies, I may still be subject to ill effects as a result of receiving a blood transfusion and/or blood products.  The following are some, but not all, of the potential risks that can occur: fever and allergic reactions, hemolytic reactions, transmission of diseases such  as Hepatitis, AIDS and Cytomegalovirus (CMV) and fluid overload.  In the event that I wish to have an autologous transfusion of my own blood, or a directed donor transfusion, I will discuss this with my physician.  Check only if Refusing Blood or Blood Products  I understand refusal of blood or blood products as deemed necessary by my physician may have serious consequences to my condition to include possible death. I hereby assume responsibility for my refusal and release the hospital, its personnel, and my physicians from any responsibility for the consequences of my refusal.    o  Refuse   5.   I authorize the use of any specimen, organs, tissues, body parts or foreign objects that may be removed from my body during the operation/procedure for diagnosis, research or teaching purposes and their subsequent disposal by hospital authorities.  I also authorize the release of specimen test results and/or written reports to my treating physician on the hospital medical staff or other referring or consulting physicians involved in my care, at the discretion of the Pathologist or my treating physician.    6.   I consent to the photographing or videotaping of the operations or procedures to be performed, including appropriate portions of my body for medical, scientific, or educational purposes, provided my identity is not revealed by the pictures or by descriptive texts accompanying them.  If the procedure has been photographed/videotaped, the surgeon will obtain the original picture, image, videotape or CD.  The hospital will not be responsible for storage, release or maintenance of the picture, image, tape or CD.    7.   I consent to the presence of a  or observers in the operating room as deemed necessary by my physician or their designees.    8.   I recognize that in the event my procedure results in extended X-Ray/fluoroscopy time, I may develop a skin reaction.    9. If I have a Do Not Attempt  Resuscitation (DNAR) order in place, that status will be suspended while in the operating room, procedural suite, and during the recovery period unless otherwise explicitly stated by me (or a person authorized to consent on my behalf). The surgeon or my attending physician will determine when the applicable recovery period ends for purposes of reinstating the DNAR order.  10. Patients having a sterilization procedure: I understand that if the procedure is successful the results will be permanent and it will therefore be impossible for me to inseminate, conceive, or bear children.  I also understand that the procedure is intended to result in sterility, although the result has not been guaranteed.   11. I acknowledge that my physician has explained sedation/analgesia administration to me including the risk and benefits I consent to the administration of sedation/analgesia as may be necessary or desirable in the judgment of my physician.    I CERTIFY THAT I HAVE READ AND FULLY UNDERSTAND THE ABOVE CONSENT TO OPERATION and/or OTHER PROCEDURE.     ____________________________________  _________________________________        ______________________________  Signature of Patient    Signature of Responsible Person                Printed Name of Responsible Person                                      ____________________________________  _____________________________                ________________________________  Signature of Witness        Date  Time         Relationship to Patient    STATEMENT OF PHYSICIAN My signature below affirms that prior to the time of the procedure; I have explained to the patient and/or his/her legal representative, the risks and benefits involved in the proposed treatment and any reasonable alternative to the proposed treatment. I have also explained the risks and benefits involved in refusal of the proposed treatment and alternatives to the proposed treatment and have answered the patient's  questions. If I have a significant financial interest in a co-management agreement or a significant financial interest in any product or implant, or other significant relationship used in this procedure/surgery, I have disclosed this and had a discussion with my patient.     _____________________________________________________              _____________________________  (Signature of Physician)                                                                                         (Date)                                   (Time)  Patient Name: Mirta Miller      : 1960      Printed: 2025     Medical Record #: [id number]                                      Page 1 of 1

## (undated) NOTE — MR AVS SNAPSHOT
[facility]  [address]  [phone]               Thank you for choosing us for your health care visit with Maryana Taylor.  MD Mariia.  We are glad to serve you and happy to provide you with this summa Take 1 tablet by mouth every 6 (six) hours as needed for Anxiety. take 1 tablet (0.5MG)  by ORAL route  every 6 hours for anxiety   Commonly known as:  ATIVAN           naproxen 500 MG Tabs   Take 500 mg by mouth 2 (two) times daily as needed.    Commonly k Font;}}{\*\revtbl{Unknown;}}\[id number]\[id number]\snkfd754\omgwa022\margt0\margb0\headery0\footery0\nogrowautofit\wrcwyr625\formshade\fet4\aendnotes\aftnnrlc\pgbrdrhead\pgbrdrfoot\sectd\bkflss83444\eveavq67200\guttersxn0\yzkswfcp129\xhofocik315\margtsxn

## (undated) NOTE — LETTER
AUTHORIZATION FOR SURGICAL OPERATION OR OTHER PROCEDURE    1. I hereby authorize Dr. Wendy Galan and the [facility] staff assigned to my case to perform the following operation and/or procedure at the [facility]:    Ultrasound guided right knee aspiration and injection with Gel One     2. My physician has explained the nature and purpose of the operation or other procedure, possible alternative methods of treatment, the risks involved, and the possibility of complication to me. I acknowledge that no guarantee has been made as to the result that may be obtained. 3.  I recognize that, during the course of this operation, or other procedure, unforseen conditions may necessitate additional or different procedure than those listed above. I, therefore, further authorize and request that the above named physician, his/her physician assistants or designees perform such procedures as are, in his/her professional opinion, necessary and desirable. 4.  Any tissue or organs removed in the operation or other procedure may be disposed of by and at the discretion of the [facility] staff and [facility] AT [facility]. 5.  I understand that in the event of a medical emergency, I will be transported by local paramedics to [facility] or other [facility] emergency department. 6.  I certify that I have read and fully understand the above consent to operation and/or other procedure. 7.  I acknowledge that my physician has explained sedation/analgesia administration to me including the risks and benefits. I consent to the administration of sedation/analgesia as may be necessary or desirable in the judgement of my physician. Witness signature: ___________________________________________________ Date:  ______/______/_____                    Time:  ________ A. M.  P.M.        Patient Name:  Dot Garcia  8/26/1960  [id number]         Patient signature: ___________________________________________________                 Statement of Physician  My signature below affirms that prior to the time of the procedure, I have explained to the patient and/or his/her guardian, the risks and benefits involved in the proposed treatment and any reasonable alternative to the proposed treatment. I have also explained the risks and benefits involved in the refusal of the proposed treatment and have answered the patient's questions.                         Date:  ______/______/_______  Provider                      Signature:  __________________________________________________________       Time:  ___________ AESTHER MCLAUGHLIN

## (undated) NOTE — LETTER
[address], [place], [place]     AUTHORIZATION FOR SURGICAL OPERATION OR PROCEDURE    I hereby authorize Dr. Ramsey More , my Physician(s) and whomever may be designated as the doctor's Assistant, to perform the following ope 4. I consent to the photographing of procedure(s) to be performed for the purposes of advancing medicine, science and/or education, provided my identity is not revealed.  If the procedure has been videotaped, the physician/surgeon will obtain the original v (Witness signature)                                                                                                  (Date)                                (Time)  STATEMENT OF PHYSICIAN My signature below affirms that prior to the time of the procedure;  I

## (undated) NOTE — LETTER
AUTHORIZATION FOR SURGICAL OPERATION OR OTHER PROCEDURE    1. I hereby authorize Dr. Marcelina Martinez and the [facility] staff assigned to my case to perform the following operation and/or procedure at the [facility]:    Ultrasound guided right knee bakers cyst aspiration and injection with corticosteroid       _______________________________________________________________________________________________    2.  My physician has explained the nature and purpose of the operation or other procedure, possible alternative methods of treatment, the risks involved, and the possibility of complication to me.  I acknowledge that no guarantee has been made as to the result that may be obtained.  3.  I recognize that, during the course of this operation, or other procedure, unforseen conditions may necessitate additional or different procedure than those listed above.  I, therefore, further authorize and request that the above named physician, his/her physician assistants or designees perform such procedures as are, in his/her professional opinion, necessary and desirable.  4.  Any tissue or organs removed in the operation or other procedure may be disposed of by and at the discretion of the [facility] staff and [facility].  5.  I understand that in the event of a medical emergency, I will be transported by local paramedics to [facility] or other hospital emergency department.  6.  I certify that I have read and fully understand the above consent to operation and/or other procedure.    7.  I acknowledge that my physician has explained sedation/analgesia administration to me including the risks and benefits.  I consent to the administration of sedation/analgesia as may be necessary or desirable in the judgement of my physician.    Witness signature: ___________________________________________________ Date:  ______/______/_____                    Time:  ________ A.MAron Kirby Rose A  Paul  [id number]  8/26/1960        Patient signature:  ___________________________________________________             Relationship to Patient:           []  Parent    Responsible person                          []  Spouse  In case of minor or                    [] Other  _____________   Incompetent name:  __________________________________________________                               (please print)      _____________      Responsible person  In case of minor or  Incompetent signature:  _______________________________________________    Statement of Physician  My signature below affirms that prior to the time of the procedure, I have explained to the patient and/or his/her guardian, the risks and benefits involved in the proposed treatment and any reasonable alternative to the proposed treatment.  I have also explained the risks and benefits involved in the refusal of the proposed treatment and have answered the patient's questions.                        Date:  ______/______/_______  Provider                      Signature:  __________________________________________________________       Time:  ___________ A.M    P.M.

## (undated) NOTE — LETTER
09/26/18        [address]      Dear [facility],    Our records indicate that you have outstanding lab work and or testing that was ordered for you and has not yet been completed:  Orders Placed This Encounter

## (undated) NOTE — LETTER
Date: 12/5/2022    Patient Name: Elaine Lai          To Whom it may concern: This letter has been written at the patient's request. The above patient was seen at the [facility] for treatment of a medical condition. This patient should be excuse form physical activities due to injuries she received of an Orthopedic nature. She is to remain Non- weightbearing until further Notice. Patient is not permitted to travel at this time. Thank you for your corporation. Sincerely,        TERRY Zendejas, PADawitC Orthopedic Surgery / Sports Medicine Specialist  [facility]  [address]   [email or web address]  [email or web address]  t: [phone]  o: [phone]  f: [phone]          This note was dictated using Dragon software. While it was briefly proofread prior to completion, some grammatical, spelling, and word choice errors due to dictation may still occur.   David Jones Junior

## (undated) NOTE — LETTER
Patient Name: Lakshmi Rincon  YOB: 1960          MRN number:  [id number]  Date:  8/15/2019  Referring Physician: Husam IBARRA    SPINE EVALUATION:    Referring Physician: Dr. Dean Briceño  Diagnosis: Cervical pain, Lumbar pain    Date of Juliet Meléndez presents to physical therapy evaluation with primary c/o  cervical pain and lumbar pain. The results of the objective tests and measures show poor facet mobility, decreased cervical ROM, TTP in cervical spine, poor posture, and SI dysfunction.   Bal No Scalenes: R DEC, L DEC Hip Flexor: R DEC, L DEC  Hamstrings: R DEC; L DEC  Piriformis: R DEC; L DEC  Quads: R WFL; L WFL  Gastroc-soleus: R DEC; L DEC     Special tests:   + R upslip of ilium, post rotation    Gait: pt ambulates on level ground with normal improved upright posturing and decreased pain with reaching overhead   Pt will be independent and compliant with comprehensive HEP to maintain progress achieved in PT   Pt will demonstrate good understanding of proper posture and body mechanics to decrease

## (undated) NOTE — MR AVS SNAPSHOT
[address], [address]  [address]  [phone]               Thank you for choosing us for your health care visit with John Cantrell MD.  We are glad to serve you and happy to provide you with this s Take 100 mg by mouth 2 (two) times daily. Commonly known as:  VIBRAMYCIN           escitalopram 10 MG Tabs   Take 1 tablet (10 mg total) by mouth daily.    Commonly known as:  LEXAPRO           Fluticasone Propionate 50 MCG/ACT Susp   USE 1 SPRAY IN [facility] Call [phone] for help. [facility] is NOT to be used for urgent needs. For medical emergencies, dial 911.            Visit [facility] online at  [email or web address]

## (undated) NOTE — LETTER
AUTHORIZATION FOR SURGICAL OPERATION OR OTHER PROCEDURE    1. I hereby authorize Dr. Marcelina Martinez and the [facility] staff assigned to my case to perform the following operation and/or procedure at the [facility]:    Right knee aspiration and injection with Hyaluronic Acid with Gel One    2.  My physician has explained the nature and purpose of the operation or other procedure, possible alternative methods of treatment, the risks involved, and the possibility of complication to me.  I acknowledge that no guarantee has been made as to the result that may be obtained.  3.  I recognize that, during the course of this operation, or other procedure, unforseen conditions may necessitate additional or different procedure than those listed above.  I, therefore, further authorize and request that the above named physician, his/her physician assistants or designees perform such procedures as are, in his/her professional opinion, necessary and desirable.  4.  Any tissue or organs removed in the operation or other procedure may be disposed of by and at the discretion of the [facility] staff and [facility].  5.  I understand that in the event of a medical emergency, I will be transported by local paramedics to [facility] or other hospital emergency department.  6.  I certify that I have read and fully understand the above consent to operation and/or other procedure.    7.  I acknowledge that my physician has explained sedation/analgesia administration to me including the risks and benefits.  I consent to the administration of sedation/analgesia as may be necessary or desirable in the judgement of my physician.    Witness signature: ___________________________________________________ Date:  ______/______/_____                    Time:  ________ A.M.  P.M.       Patient Name: Mirta Miller  8/26/1960  [id number]       Patient signature:   ___________________________________________________             _    Statement of Physician  My signature below affirms that prior to the time of the procedure, I have explained to the patient and/or his/her guardian, the risks and benefits involved in the proposed treatment and any reasonable alternative to the proposed treatment.  I have also explained the risks and benefits involved in the refusal of the proposed treatment and have answered the patient's questions.                        Date:  ______/______/_______  Provider                      Signature:  __________________________________________________________       Time:  ___________ AESTHER    P.MAron

## (undated) NOTE — MR AVS SNAPSHOT
[place]  [address]  [phone]               Thank you for choosing us for your health care visit with Giuliana Vivar.  MD Mariia.  We are glad to serve you and happy to provide you with this summa Commonly known as:  FLONASE           Levocetirizine Dihydrochloride 5 MG Tabs   Take 1 tablet (5 mg total) by mouth every evening.    Commonly known as:  XYZAL           LORazepam 0.5 MG Tabs   Take 1 tablet by mouth every 6 (six) hours as needed for Anxie

## (undated) NOTE — LETTER
Date: 2023      Patient Name: Komal Norwood      : 1960        Thank you for choosing [address] as your health care provider. Your physician has deemed the following medical service(s) necessary. However, your insurance plan may not pay for all of your health care and costs and may deny payment for this service. The fact that your insurance plan does not pay for an item or service does not mean you should not receive it. The purpose of this form is to help you make an informed decision about whether or not you want to receive this service(s) that may not be paid for by your insurance plan. CPT Code Description     Cost     _________ Ultrasound guided right knee aspiration and injection with Gel One       _________ ______________________________ _____________      _________ ______________________________ _____________      I understand that the above mentioned service(s) or supply may not be covered by my insurance company.  I agree to be financially responsible for the cost of this service or supply in the event of my insurance denies payment as a non-covered benefit.        ______________________________________________________________________  Signature of Patient or Patient's Representative  Relationship  Date    ______________________________________________________________________  Signature of Witness to signing of form   Printed Name

## (undated) NOTE — LETTER
MINOR CASE LETTER      11/29/2017        Dear Ben Stanley are having a hysteroscopy with myosure polypectomy on 12/4/17 at 10:30am.    Do not eat or drink anything (including water) after midnight the night before surgery.     If your procedure is schedule

## (undated) NOTE — LETTER
JOSEF Notifier: [employer]. Patient Name: Mirta Miller Identification Number: [id number]                          Advance Beneficiary Notice of Noncoverage (ABN)   NOTE:  If Medicare doesn’t pay for D. item/service(s) below, you may have to pay.  Medicare does not pay for everything, even some care that you or your health care provider have good reason to think you need. We expect Medicare may not pay for the D. item/service(s) below.  D. Items or Services   Bilateral knee aspiration and injection with Hyaluronic Acid (Gel one preferred, if not any other SMITH is okay)    E. Reason Medicare May Not Pay: F. Estimated Cost   __ EKG ($87.00)  __ Pap smear ($101) __Pelvic/Breast ($147.00)  __ Ear Irrigation ($138)  _x_ Injection(s)  ___ Tdap ($181)       ___ Meningitis ($290)   __Prevnar ($555)  ___ Td ($66)              ___ Prevnar 20 ($549)  ___ Hep A ($152)     ___ Prolia ($1827)         __ Xiaflex ($            )   ___ Hep B ($150)     ___ Pneumovax ($287)                                         ___ Vaccine Administration ($65)   __ Medicare does not cover this service      __ Medicare may not pay for this   item/service for your condition     __ Medicare may not pay for this item/service as often as this        WHAT YOU NEED TO DO NOW:  Read this notice, so you can make an informed decision about your care.  Ask us any questions that you may have after you finish reading.  Choose an option below about whether to receive the D. item/service(s) listed above.  Note: If you choose Option 1 or 2, we may help you to use any other insurance that you might have, but Medicare cannot require us to do this.  G. OPTIONS: Check only one box.  We cannot choose a box for you.   OPTION 1. I want the D. item/service(s) listed above. You may ask to be paid now, but I also want Medicare billed for an official decision on payment, which is sent to me on a Medicare Summary Notice (MSN). I understand that if  Medicare doesn’t pay, I am responsible for payment, but I can appeal to Medicare by following the directions on the MSN. If Medicare does pay, you will refund any payments I made to you, less co-pays or deductibles.  OPTION 2. I want the D. item/service(s) listed above, but do not bill Medicare. You may ask to be paid now as I am responsible for payment. I cannot appeal if Medicare is not billed.  OPTION 3. I don't want the D. item/service(s) listed above. I understand with this choice I am not responsible for payment, and I cannot appeal to see if Medicare would pay.    H. Additional Information:    This notice gives our opinion, not an official Medicare decision. If you have other questions on this notice or Medicare billing, call 1-800-MEDICARE (1-666.367.4598/TTY: 1-812.372.6557). Signing below means that you have received and understand this notice. You also receive a copy.  I. Signature: J. Date:       You have the right to get Medicare information in an accessible format, like large print, Braille, or audio. You also have the right to file a complaint if you feel you’ve been discriminated against. Visit Medicare.gov/about- us/nvpiwcdoxihqq-fuxxzrnjxbdszqmvu-fjeogp.  According to the Paperwork Reduction Act of 1995, no persons are required to respond to a collection of information unless it displays a valid OMB control number. The valid OMB control number for this information collection is 3566-8862. The time required to complete this information collection is estimated to average 7 minutes per response, including the time to review instructions, search existing data resources, gather the data needed, and complete and review the information collection. If you have comments concerning the accuracy of the time estimate or suggestions for improving this form, please write to: CMS, [address]     [place], Attn: JAVAD Reports Clearance Officer, [place], [place] [address].  Form CMS-R-131 (Exp.  1/31/2026) Form Approved [facility] No. [id number]

## (undated) NOTE — LETTER
Date: 2024      Patient Name: Mirta Miller      : 1960        Thank you for choosing [facility] as your health care provider. Your physician has deemed the following medical service(s) necessary. However, your insurance plan may not pay for all of your health care and costs and may deny payment for this service. The fact that your insurance plan does not pay for an item or service does not mean you should not receive it. The purpose of this form is to help you make an informed decision about whether or not you want to receive this service(s) that may not be paid for by your insurance plan.    CPT Code Description     Cost     Ultrasound guided right knee bakers cyst aspiration and injection with corticosteroid       _________ ______________________________ _____________      _________ ______________________________ _____________      I understand that the above mentioned service(s) or supply may not be covered by my insurance company. I agree to be financially responsible for the cost of this service or supply in the event of my insurance denies payment as a non-covered benefit.        ______________________________________________________________________  Signature of Patient or Patient's Representative  Relationship  Date    ______________________________________________________________________  Signature of Witness to signing of form   Printed Name

## (undated) NOTE — Clinical Note
AUTHORIZATION FOR SURGICAL OPERATION OR OTHER PROCEDURE    1.  I hereby authorize Dr. Louann Vail, and [facility] staff assigned to my case to perform the following operation and/or procedure at the [facility]:    ___________________________ Patient signature:  ___________________________________________________             Relationship to Patient:             Parent    Responsible person                            Spouse  In case of minor or                     Other  _____________   Incompet

## (undated) NOTE — LETTER
AUTHORIZATION FOR SURGICAL OPERATION OR OTHER PROCEDURE    1. I hereby authorize Dr. Marcelina Martinez and the [facility] staff assigned to my case to perform the following operation and/or procedure at the [facility]:    Right knee aspiration and injection with corticosteroid     2.  My physician has explained the nature and purpose of the operation or other procedure, possible alternative methods of treatment, the risks involved, and the possibility of complication to me.  I acknowledge that no guarantee has been made as to the result that may be obtained.  3.  I recognize that, during the course of this operation, or other procedure, unforseen conditions may necessitate additional or different procedure than those listed above.  I, therefore, further authorize and request that the above named physician, his/her physician assistants or designees perform such procedures as are, in his/her professional opinion, necessary and desirable.  4.  Any tissue or organs removed in the operation or other procedure may be disposed of by and at the discretion of the [facility] staff and [facility].  5.  I understand that in the event of a medical emergency, I will be transported by local paramedics to [facility] or other hospital emergency department.  6.  I certify that I have read and fully understand the above consent to operation and/or other procedure.    7.  I acknowledge that my physician has explained sedation/analgesia administration to me including the risks and benefits.  I consent to the administration of sedation/analgesia as may be necessary or desirable in the judgement of my physician.    Witness signature: ___________________________________________________ Date:  ______/______/_____                    Time:  ________ A.M.  P.M.       Patient Name:   Mirta Miller  8/26/1960  [id number]       Patient signature:   ___________________________________________________             Statement of Physician  My signature below affirms that prior to the time of the procedure, I have explained to the patient and/or his/her guardian, the risks and benefits involved in the proposed treatment and any reasonable alternative to the proposed treatment.  I have also explained the risks and benefits involved in the refusal of the proposed treatment and have answered the patient's questions.                        Date:  ______/______/_______  Provider                      Signature:  __________________________________________________________       Time:  ___________ AESTHER MCLAUGHLIN

## (undated) NOTE — LETTER
[facility]  Administration of Anesthesia  I, Mirta Miller agree to be cared for by a physician anesthesiologist alone and/or with a nurse anesthetist, who is specially trained to monitor me and give me medicine to put me to sleep or keep me comfortable during my procedure    I understand that my anesthesiologist and/or anesthetist is not an employee or agent of [facility] or [employer]. He or she works for [facility]    As the patient asking for anesthesia services, I agree to:  Allow the anesthesiologist (anesthesia doctor) to give me medicine and do additional procedures as necessary. Some examples are: Starting or using an “IV” to give me medicine, fluids or blood during my procedure, and having a breathing tube placed to help me breathe when I’m asleep (intubation). In the event that my heart stops working properly, I understand that my anesthesiologist will make every effort to sustain my life, unless otherwise directed by [facility] Do Not Resuscitate documents.  Tell my anesthesia doctor before my procedure:  If I am pregnant.  The last time that I ate or drank.  iii. All of the medicines I take (including prescriptions, herbal supplements, and pills I can buy without a prescription (including street drugs/illegal medications). Failure to inform my anesthesiologist about these medicines may increase my risk of anesthetic complications.  iv.If I am allergic to anything or have had a reaction to anesthesia before.  I understand how the anesthesia medicine will help me (benefits).  I understand that with any type of anesthesia medicine there are risks:  The most common risks are: nausea, vomiting, sore throat, muscle soreness, damage to my eyes, mouth, or teeth (from breathing tube placement).  Rare risks include: remembering what happened during my procedure, allergic reactions to medications, injury to my airway, heart, lungs, vision, nerves, or  muscles and in extremely rare instances death.  My doctor has explained to me other choices available to me for my care (alternatives).  Pregnant Patients (“epidural”):  I understand that the risks of having an epidural (medicine given into my back to help control pain during labor), include itching, low blood pressure, difficulty urinating, headache or slowing of the baby’s heart. Very rare risks include infection, bleeding, seizure, irregular heart rhythms and nerve injury.  Regional Anesthesia (“spinal”, “epidural”, & “nerve blocks”):  I understand that rare but potential complications include headache, bleeding, infection, seizure, irregular heart rhythms, and nerve injury.    _____________________________________________________________________________  Patient (or Representative) Signature/Relationship to Patient  Date   Time    _____________________________________________________________________________   Name (if used)    Language/Organization   Time    _____________________________________________________________________________  Nurse Anesthetist Signature     Date   Time  _____________________________________________________________________________  Anesthesiologist Signature     Date   Time  I have discussed the procedure and information above with the patient (or patient’s representative) and answered their questions. The patient or their representative has agreed to have anesthesia services.    _____________________________________________________________________________  Witness        Date   Time  I have verified that the signature is that of the patient or patient’s representative, and that it was signed before the procedure  Patient Name: Mirta Miller     : 1960                 Printed: 2025 at 8:42 AM    Medical Record #: [id number]                                            Page 1 of 1  ----------ANESTHESIA CONSENT----------

## (undated) NOTE — LETTER
JOSEF Notifier: [employer]. Patient Name: Mirta Miller Identification Number: [id number]                          Advance Beneficiary Notice of Noncoverage (ABN)   NOTE:  If Medicare doesn’t pay for D. item/service(s) below, you may have to pay.  Medicare does not pay for everything, even some care that you or your health care provider have good reason to think you need. We expect Medicare may not pay for the D. item/service(s) below.  D. Items or Services  Right knee aspiration and injection with corticosteroid    E. Reason Medicare May Not Pay: F. Estimated Cost   __ EKG ($87.00)  __ Pap smear ($101) __Pelvic/Breast ($147.00)  __ Ear Irrigation ($138)  _x_ Injection(s)  ___ Tdap ($181)       ___ Meningitis ($290)   __Prevnar ($555)  ___ Td ($66)              ___ Prevnar 20 ($549)  ___ Hep A ($152)     ___ Prolia ($1827)         __ Xiaflex ($            )   ___ Hep B ($150)     ___ Pneumovax ($287)                                         ___ Vaccine Administration ($65)   __ Medicare does not cover this service      __ Medicare may not pay for this   item/service for your condition     __ Medicare may not pay for this item/service as often as this        WHAT YOU NEED TO DO NOW:  Read this notice, so you can make an informed decision about your care.  Ask us any questions that you may have after you finish reading.  Choose an option below about whether to receive the D. item/service(s) listed above.  Note: If you choose Option 1 or 2, we may help you to use any other insurance that you might have, but Medicare cannot require us to do this.  G. OPTIONS: Check only one box.  We cannot choose a box for you.   OPTION 1. I want the D. item/service(s) listed above. You may ask to be paid now, but I also want Medicare billed for an official decision on payment, which is sent to me on a Medicare Summary Notice (MSN). I understand that if Medicare doesn’t pay, I am responsible for payment, but I  can appeal to Medicare by following the directions on the MSN. If Medicare does pay, you will refund any payments I made to you, less co-pays or deductibles.  OPTION 2. I want the D. item/service(s) listed above, but do not bill Medicare. You may ask to be paid now as I am responsible for payment. I cannot appeal if Medicare is not billed.  OPTION 3. I don't want the D. item/service(s) listed above. I understand with this choice I am not responsible for payment, and I cannot appeal to see if Medicare would pay.    H. Additional Information:    This notice gives our opinion, not an official Medicare decision. If you have other questions on this notice or Medicare billing, call 1-800-MEDICARE (1-788.189.6249/TTY: 1-788.997.2299). Signing below means that you have received and understand this notice. You also receive a copy.  I. Signature: J. Date:       You have the right to get Medicare information in an accessible format, like large print, Braille, or audio. You also have the right to file a complaint if you feel you’ve been discriminated against. Visit Medicare.gov/about- us/fpumokigmqgyi-wmfsvdoigzuoylcqk-enyvdv.  According to the Paperwork Reduction Act of 1995, no persons are required to respond to a collection of information unless it displays a valid OMB control number. The valid OMB control number for this information collection is 5009-4207. The time required to complete this information collection is estimated to average 7 minutes per response, including the time to review instructions, search existing data resources, gather the data needed, and complete and review the information collection. If you have comments concerning the accuracy of the time estimate or suggestions for improving this form, please write to: CMS, [facility]     Carmen Attn: JAVAD Reports Clearance Officer, [place], [place] [address].  Form CMS-R-131 (Exp. 1/31/2026) Form Approved OMB No. [id number]

## (undated) NOTE — Clinical Note
2/28/2017        Dear Horacio Kothari MD,    Thank you for allowing me to participate in your patient's care. We appreciate your confidence in their care for your patient.     The patient will find the results of our examination and our treatment rec

## (undated) NOTE — LETTER
[address] [place] [address]  Authorization for Surgical Operation or Procedure    1.  I hereby authorize Dr. Braden He, my physician and the assistant, to perform the following operation and/or procedure: Magnetic Resonance 5. I consent to the photographing of the operations or procedures to be performed for the purposes of advancing medicine, science, and/or education, provided my identity is not revealed.  If the procedure has been videotaped, the physician/surgeon will obta risks and benefits involved in the proposed treatment and any reasonable alternative to the proposed treatment. I have also explained the risks and benefits involved in the refusal of the proposed treatment and have answered the patient's questions.  If I h

## (undated) NOTE — LETTER
[facility], [place]  Authorization for Surgical Operation or Procedure    1.  I hereby authorize Dr. Yvette Hrenández, my physician and the assistant, to perform the following operation and/or procedure: Magnetic Resonance Imaging Biops 5. I consent to the photographing of the operations or procedures to be performed for the purposes of advancing medicine, science, and/or education, provided my identity is not revealed.  If the procedure has been videotaped, the physician/surgeon will obta risks and benefits involved in the proposed treatment and any reasonable alternative to the proposed treatment. I have also explained the risks and benefits involved in the refusal of the proposed treatment and have answered the patient's questions.  If I h

## (undated) NOTE — LETTER
20      Patient: Lakshmi Rincon  : 1960 Visit date: 2020    Dear Roland Bowman,      I examined your patient in consultation today.     She has a nondisplaced comminuted fracture of the distal phalanx of the right middle fi

## (undated) NOTE — LETTER
AUTHORIZATION FOR SURGICAL OPERATION OR OTHER PROCEDURE    1.  I hereby authorize Dr. Dhara Kim and the [facility] staff assigned to my case to perform the following operation and/or procedure at the [facility]:    ____Right Knee Aspiration and Injection with Time:  ________ A. Hedy Qureshi 8/26/1960                  [id number]    Patient Name:  ______________________________________________________  (please print)       Patient signature:  ______________________________________

## (undated) NOTE — LETTER
Date: 2023      Patient Name: Tanika Edmond      : 1960        Thank you for choosing [facility] as your health care provider. Your physician has deemed the following medical service(s) necessary. However, your insurance plan may not pay for all of your health care and costs and may deny payment for this service. The fact that your insurance plan does not pay for an item or service does not mean you should not receive it. The purpose of this form is to help you make an informed decision about whether or not you want to receive this service(s) that may not be paid for by your insurance plan. CPT Code Description     Cost    Right knee aspiration and injection with Gel-one   _________ ______________________________  _____________      _________ ______________________________ _____________      _________ ______________________________ _____________      I understand that the above mentioned service(s) or supply may not be covered by my insurance company.  I agree to be financially responsible for the cost of this service or supply in the event of my insurance denies payment as a non-covered benefit.        ______________________________________________________________________  Signature of Patient or Patient's Representative  Relationship  Date    ______________________________________________________________________  Signature of Witness to signing of form   Printed Name

## (undated) NOTE — MR AVS SNAPSHOT
[address]  [phone]               Thank you for choosing us for your health care visit with FRED Smith.   We are glad to serve you and happy to provide you with this summary of · Runny nose  · Fluid draining from the nose down the throat (postnasal drip)  · Headache  · Cough  · Pain in the sinuses  · Thick, colored fluid from the nose (mucus)  · Fever  Diagnosing ABRS  ABRS may be diagnosed if you’ve had an upper respiratory infe © 9425-2051 [address], [address] [employer]. All rights reserved. This information is not intended as a substitute for professional medical care. Always follow your healthcare professional's instructions.         Sinusit help. (NOTE: Persons with high blood pressure should not use decongestants.  They can raise blood pressure.)  · Over-the-counter antihistamines may help if allergies contributed to your sinusitis.    · Do not use nasal rinses or irrigation during an acute s How should I use this medicine? Take this medicine by mouth with a full glass of water. Follow the directions on the prescription label. It is best to take this medicine without food, but if it upsets your stomach take it with food.  Take your medicine at light. Keep container tightly closed. Throw away any unused medicine after the expiration date. Taking this medicine after the expiration date can make you seriously ill. What should I tell my health care provider before I take this medicine?   They need t use mosquito nets, keep your body covered, and use an insect repellent. Date Last Reviewed:   NOTE:This sheet is a summary. It may not cover all possible information.  If you have questions about this medicine, talk to your doctor, pharmacist, or health ca NASONEX 50 MCG/ACT Susp   Generic drug:  Mometasone Furoate   by Nasal route.  inhale 1 spray by Intranasal route 2 times every day in each nostril           nystatin [id number] UNIT/GM Crea   Apply lightly to irritated area  Twice a day   Commonly known as:

## (undated) NOTE — LETTER
AUTHORIZATION FOR SURGICAL OPERATION OR OTHER PROCEDURE    1.  I hereby authorize   and the [facility] staff assigned to my case to perform the following operation and/or procedure at the [facility]:    Ultrasound guided bilateral CMC joint injection wit Time:  ________ A. M.  P.M.        Patient Name:  _____Mirta Trevinos  _________________________________________________  (please print)       Patient signature:  ___________________________________________________             Gato [place]

## (undated) NOTE — LETTER
AUTHORIZATION FOR SURGICAL OPERATION OR OTHER PROCEDURE    1. I hereby authorize Dr. Marceilna Martinez and the [facility] staff assigned to my case to perform the following operation and/or procedure at the [facility]:     Bilateral knee aspiration and injection with Hyaluronic Acid (Gel one preferred, if not any other HA is okay)     2.  My physician has explained the nature and purpose of the operation or other procedure, possible alternative methods of treatment, the risks involved, and the possibility of complication to me.  I acknowledge that no guarantee has been made as to the result that may be obtained.  3.  I recognize that, during the course of this operation, or other procedure, unforseen conditions may necessitate additional or different procedure than those listed above.  I, therefore, further authorize and request that the above named physician, his/her physician assistants or designees perform such procedures as are, in his/her professional opinion, necessary and desirable.  4.  Any tissue or organs removed in the operation or other procedure may be disposed of by and at the discretion of the [facility] staff and [facility].  5.  I understand that in the event of a medical emergency, I will be transported by local paramedics to [facility] or other [facility] emergency department.  6.  I certify that I have read and fully understand the above consent to operation and/or other procedure.    7.  I acknowledge that my physician has explained sedation/analgesia administration to me including the risks and benefits.  I consent to the administration of sedation/analgesia as may be necessary or desirable in the judgement of my physician.    Witness signature: ___________________________________________________ Date:  ______/______/_____                    Time:  ________ A.M.  P.M.       Patient Name: Mirta Miller  8/26/1960  [id number]       Patient signature:   ___________________________________________________        Statement of Physician  My signature below affirms that prior to the time of the procedure, I have explained to the patient and/or his/her guardian, the risks and benefits involved in the proposed treatment and any reasonable alternative to the proposed treatment.  I have also explained the risks and benefits involved in the refusal of the proposed treatment and have answered the patient's questions.                        Date:  ______/______/_______  Provider                      Signature:  __________________________________________________________       Time:  ___________ AESTHER MCLAUGHLIN